# Patient Record
Sex: MALE | Race: BLACK OR AFRICAN AMERICAN | NOT HISPANIC OR LATINO | Employment: OTHER | URBAN - METROPOLITAN AREA
[De-identification: names, ages, dates, MRNs, and addresses within clinical notes are randomized per-mention and may not be internally consistent; named-entity substitution may affect disease eponyms.]

---

## 2017-05-24 ENCOUNTER — HOSPITAL ENCOUNTER (EMERGENCY)
Facility: CLINIC | Age: 52
Discharge: HOME | End: 2017-05-24
Attending: EMERGENCY MEDICINE | Admitting: EMERGENCY MEDICINE
Payer: COMMERCIAL

## 2017-05-24 ENCOUNTER — APPOINTMENT (OUTPATIENT)
Dept: GENERAL RADIOLOGY | Facility: CLINIC | Age: 52
End: 2017-05-24
Attending: EMERGENCY MEDICINE
Payer: COMMERCIAL

## 2017-05-24 VITALS
RESPIRATION RATE: 20 BRPM | HEART RATE: 97 BPM | OXYGEN SATURATION: 97 % | BODY MASS INDEX: 25.07 KG/M2 | SYSTOLIC BLOOD PRESSURE: 137 MMHG | WEIGHT: 190 LBS | DIASTOLIC BLOOD PRESSURE: 92 MMHG | TEMPERATURE: 99.1 F

## 2017-05-24 DIAGNOSIS — S46.002A ROTATOR CUFF INJURY, LEFT, INITIAL ENCOUNTER: ICD-10-CM

## 2017-05-24 PROCEDURE — 73030 X-RAY EXAM OF SHOULDER: CPT | Mod: LT

## 2017-05-24 PROCEDURE — 25000132 ZZH RX MED GY IP 250 OP 250 PS 637: Performed by: EMERGENCY MEDICINE

## 2017-05-24 PROCEDURE — 99283 EMERGENCY DEPT VISIT LOW MDM: CPT

## 2017-05-24 RX ORDER — OXYCODONE AND ACETAMINOPHEN 5; 325 MG/1; MG/1
2 TABLET ORAL ONCE
Status: COMPLETED | OUTPATIENT
Start: 2017-05-24 | End: 2017-05-24

## 2017-05-24 RX ORDER — NAPROXEN 500 MG/1
250 TABLET ORAL
Qty: 30 TABLET | Refills: 0 | Status: SHIPPED | OUTPATIENT
Start: 2017-05-24 | End: 2017-06-01

## 2017-05-24 RX ORDER — HYDROCODONE BITARTRATE AND ACETAMINOPHEN 5; 325 MG/1; MG/1
1-2 TABLET ORAL EVERY 4 HOURS PRN
Qty: 15 TABLET | Refills: 0 | Status: SHIPPED | OUTPATIENT
Start: 2017-05-24 | End: 2019-06-20

## 2017-05-24 RX ADMIN — OXYCODONE HYDROCHLORIDE AND ACETAMINOPHEN 2 TABLET: 5; 325 TABLET ORAL at 15:40

## 2017-05-24 ASSESSMENT — ENCOUNTER SYMPTOMS: MYALGIAS: 1

## 2017-05-24 NOTE — ED AVS SNAPSHOT
[facility] Emergency Department    [address]    [facility] [address]    Phone:  [phone]    Fax:  [phone]                                       Salas Macias   MRN: [id number]    Department:  [facility] Emergency Department   Date of Visit:  5/24/2017           Patient Information     Date Of Birth          1965        Your diagnoses for this visit were:     Rotator cuff injury, left, initial encounter        You were seen by Ade Walters MD.      Follow-up Information     Follow up with Clinic, [facility].    Specialty:  Internal Medicine    Contact information:    [address]  [facility] [address]  [phone]          Discharge Instructions         Understanding Rotator Cuff Injuries  The rotator cuff is a team of muscles and connecting tendons in the shoulder. It attaches your upper arm to your shoulder blade. Your rotator cuff helps you reach, throw, push, pull, and lift. Without it, your shoulder can't do its job properly.        Overuse tendonitis is irritation, bruising, or fraying of the rotator cuff.       A healthy rotator cuff  A healthy rotator cuff gives your shoulder flexibility and control. The rotator cuff holds your upper arm bone (humerus) in your shoulder socket (glenoid). It also helps move the shoulder.  A damaged rotator cuff  Pain and weakness told you that something was wrong with your shoulder. Now you know it s a rotator cuff problem. Rotator cuff tendons can become damaged or inflamed. This is called tendonitis. Possible causes include:    Irritation from overuse    Bursal inflammation (bursitis)    Pinching (impingement)    Calcium deposits (calcification)    Tears in the tendon.  Getting your shoulder healthy again  Care for your shoulder will most likely begin with nonsurgical treatments. You may start with simple rest. If needed, you may have injections that decrease inflammation and pain. Your  "healthcare provider will tell you how often you may need these treatments. If rest and injections relieve your pain, you will be given an exercise program. This will help restore your shoulder s strength and function. If your pain continues, your healthcare provider may suggest surgery to repair the rotator cuff.    6341-4383 [employer]. [address] [place] [address]. All rights reserved. This information is not intended as a substitute for professional medical care. Always follow your healthcare professional's instructions.          24 Hour Appointment Hotline       To make an appointment at any [facility], call [phone] ([phone]). If you don't have a family doctor or clinic, we will help you find one. [facility] clinics are conveniently located to serve the needs of you and your family.          ED Discharge Orders     PHYSICAL THERAPY REFERRAL       *This therapy referral will be filtered to a centralized scheduling office at [facility] and the patient will receive a call to schedule an appointment at a [facility] location most convenient for them. *     [facility] provides Physical Therapy evaluation and treatment and many specialty services across the [facility] system.  If requesting a specialty program, please choose from the list below.    If you have not heard from the scheduling office within 2 business days, please call [phone] for all locations, with the exception of [place], please call [phone].  Treatment: Evaluation & Treatment    Please be aware that coverage of these services is subject to the terms and limitations of your health insurance plan.  Call member services at your health plan with any benefit or coverage questions.      **Note to Provider:  If you are referring outside of [facility] for the therapy appointment, please list the name of the location in the \"special instructions\" above, print the " referral and give to the patient to schedule the appointment.                     Review of your medicines      START taking        Dose / Directions Last dose taken    HYDROcodone-acetaminophen 5-325 MG per tablet   Commonly known as:  NORCO   Dose:  1-2 tablet   Quantity:  15 tablet        Take 1-2 tablets by mouth every 4 hours as needed for moderate to severe pain   Refills:  0        naproxen 500 MG tablet   Commonly known as:  NAPROSYN   Dose:  250 mg   Quantity:  30 tablet        Take 0.5 tablets (250 mg) by mouth 3 times daily (with meals) for 8 days   Refills:  0          Our records show that you are taking the medicines listed below. If these are incorrect, please call your family doctor or clinic.        Dose / Directions Last dose taken    FLUoxetine 10 MG capsule   Commonly known as:  PROzac   Dose:  10 mg   Quantity:  90 capsule        Take 1 capsule (10 mg) by mouth daily   Refills:  3        OLANZapine 7.5 MG tablet   Commonly known as:  zyPREXA   Quantity:  49 tablet        Refills:  0        traZODone 100 MG tablet   Commonly known as:  DESYREL   Dose:  100 mg   Quantity:  90 tablet        Take 1 tablet (100 mg) by mouth nightly as needed for sleep   Refills:  3        vitamin D 2000 UNITS tablet   Dose:  2000 Units   Quantity:  100 tablet        Take 2,000 Units by mouth daily   Refills:  3                Prescriptions were sent or printed at these locations (2 Prescriptions)                   Other Prescriptions                Printed at Department/Unit printer (2 of 2)         HYDROcodone-acetaminophen (NORCO) 5-325 MG per tablet               naproxen (NAPROSYN) 500 MG tablet                Procedures and tests performed during your visit     Shoulder XR, G/E 3 views, left      Orders Needing Specimen Collection     None      Pending Results     No orders found from 5/22/2017 to 5/25/2017.            Pending Culture Results     No orders found from 5/22/2017 to 5/25/2017.            Pending  Results Instructions     If you had any lab results that were not finalized at the time of your Discharge, you can call the ED Lab Result RN at [phone]. You will be contacted by this team for any positive Lab results or changes in treatment. The nurses are available 7 days a week from 10A to 6:30P.  You can leave a message 24 hours per day and they will return your call.        Test Results From Your Hospital Stay        5/24/2017  4:02 PM      Narrative     SHOULDER LEFT THREE OR MORE VIEWS   5/24/2017 3:58 PM     HISTORY: Left shoulder pain.    COMPARISON: None.        Impression     IMPRESSION: Calcification noted in the distal rotator cuff compatible  with calcific tendinitis. No evidence of fracture or dislocation.    SOPHIE FISHER MD                Clinical Quality Measure: Blood Pressure Screening     Your blood pressure was checked while you were in the emergency department today. The last reading we obtained was  BP: (!) 141/109 . Please read the guidelines below about what these numbers mean and what you should do about them.  If your systolic blood pressure (the top number) is less than 120 and your diastolic blood pressure (the bottom number) is less than 80, then your blood pressure is normal. There is nothing more that you need to do about it.  If your systolic blood pressure (the top number) is 120-139 or your diastolic blood pressure (the bottom number) is 80-89, your blood pressure may be higher than it should be. You should have your blood pressure rechecked within a year by a primary care provider.  If your systolic blood pressure (the top number) is 140 or greater or your diastolic blood pressure (the bottom number) is 90 or greater, you may have high blood pressure. High blood pressure is treatable, but if left untreated over time it can put you at risk for heart attack, stroke, or kidney failure. You should have your blood pressure rechecked by a primary care provider within the next 4  "weeks.  If your provider in the emergency department today gave you specific instructions to follow-up with your doctor or provider even sooner than that, you should follow that instruction and not wait for up to 4 weeks for your follow-up visit.        Thank you for choosing [facility]       Thank you for choosing [facility] for your care. Our goal is always to provide you with excellent care. Hearing back from our patients is one way we can continue to improve our services. Please take a few minutes to complete the written survey that you may receive in the mail after you visit with us. Thank you!        [employer] Information     [employer] lets you send messages to your doctor, view your test results, renew your prescriptions, schedule appointments and more. To sign up, go to [email or web address] . Click on \"Log in\" on the left side of the screen, which will take you to the Welcome page. Then click on \"Sign up Now\" on the right side of the page.     You will be asked to enter the access code listed below, as well as some personal information. Please follow the directions to create your username and password.     Your access code is: [email or web address]  Expires: 2017  4:17 PM     Your access code will  in 90 days. If you need help or a new code, please call your [facility] clinic or [phone].        Care EveryWhere ID     This is your Care EveryWhere ID. This could be used by other organizations to access your [facility] medical records  [id number]        After Visit Summary       This is your record. Keep this with you and show to your community pharmacist(s) and doctor(s) at your next visit.                  "

## 2017-05-24 NOTE — ED AVS SNAPSHOT
[facility] Emergency Department    [address]    [facility] [address]    Phone:  [phone]    Fax:  [phone]                                       Salas Macias   MRN: [id number]    Department:  [facility] Emergency Department   Date of Visit:  5/24/2017           After Visit Summary Signature Page     I have received my discharge instructions, and my questions have been answered. I have discussed any challenges I see with this plan with the nurse or doctor.    ..........................................................................................................................................  Patient/Patient Representative Signature      ..........................................................................................................................................  Patient Representative Print Name and Relationship to Patient    ..................................................               ................................................  Date                                            Time    ..........................................................................................................................................  Reviewed by Signature/Title    ...................................................              ..............................................  Date                                                            Time

## 2017-05-24 NOTE — ED NOTES
Left shoulder pain started about one week ago.  Pain radiates down to elbow and into left 4th and 3rd fingers.  No known injury, denies history of this in the past.  ABCs intact.  Patient is alert and oriented x3.

## 2017-05-24 NOTE — DISCHARGE INSTRUCTIONS
Understanding Rotator Cuff Injuries  The rotator cuff is a team of muscles and connecting tendons in the shoulder. It attaches your upper arm to your shoulder blade. Your rotator cuff helps you reach, throw, push, pull, and lift. Without it, your shoulder can't do its job properly.        Overuse tendonitis is irritation, bruising, or fraying of the rotator cuff.       A healthy rotator cuff  A healthy rotator cuff gives your shoulder flexibility and control. The rotator cuff holds your upper arm bone (humerus) in your shoulder socket (glenoid). It also helps move the shoulder.  A damaged rotator cuff  Pain and weakness told you that something was wrong with your shoulder. Now you know it s a rotator cuff problem. Rotator cuff tendons can become damaged or inflamed. This is called tendonitis. Possible causes include:    Irritation from overuse    Bursal inflammation (bursitis)    Pinching (impingement)    Calcium deposits (calcification)    Tears in the tendon.  Getting your shoulder healthy again  Care for your shoulder will most likely begin with nonsurgical treatments. You may start with simple rest. If needed, you may have injections that decrease inflammation and pain. Your healthcare provider will tell you how often you may need these treatments. If rest and injections relieve your pain, you will be given an exercise program. This will help restore your shoulder s strength and function. If your pain continues, your healthcare provider may suggest surgery to repair the rotator cuff.    5627-0708 [employer]. [address] [place] [address], [place] [address]. All rights reserved. This information is not intended as a substitute for professional medical care. Always follow your healthcare professional's instructions.

## 2017-06-06 ENCOUNTER — THERAPY VISIT (OUTPATIENT)
Dept: PHYSICAL THERAPY | Facility: CLINIC | Age: 52
End: 2017-06-06
Payer: COMMERCIAL

## 2017-06-06 DIAGNOSIS — S46.009A ROTATOR CUFF INJURY: Primary | ICD-10-CM

## 2017-06-06 PROCEDURE — 97161 PT EVAL LOW COMPLEX 20 MIN: CPT | Mod: GP | Performed by: PHYSICAL THERAPIST

## 2017-06-06 PROCEDURE — 97110 THERAPEUTIC EXERCISES: CPT | Mod: GP | Performed by: PHYSICAL THERAPIST

## 2017-06-06 NOTE — PROGRESS NOTES
Subjective:    HPI                    Objective:    System    Physical Exam    General     [facility]     Physical Therapy Initial Evaluation:   2017  Subjective:   Chief Complaint:    Pain: Anterior left shoulder   Numbness/Tingling: None presently   Weakness: in the left shoulder   Stiffness: None   Other: None  New/Recurrent/Chronic: New  Patient's Goal(s): Get the pain to go away and use his arm again  DOI/onset: 4 weeks ago   Referral Date: 2017  Mechanism of onset: unknown, sudden onset  PMH/surgical history/trauma: Smoking (1 pack per day), paranoid schizophrenia  Medications: Pain, Sleep, Anti-depressants  General health as reported by patient: Good    Previous Treatment (Effect): None  Imaging: X-ray: IMPRESSION: Calcification noted in the distal rotator cuff compatible with calcific tendinitis. No evidence of fracture or dislocation.  Symptom Stability: stable  AM/PM: same all day  Quality of Pain: sharp pain when moving  Pain: 3/10 at present, 3/10 at best, 6/10 at worst  Better: stretch it,   Worse: reaching (overehad, behind the back),   Progression of Symptoms since onset: little bit better   Occupation: None  Sleepin-3 hours to fall asleep, wakes him 3 or 4 times per night  Other current functional challenges: reaching, sleeping  Current Functional Status: reaching - pain with reaching up to 6/10, cannot reach above chest height or behind the back    Previous Functional Status: No restrictions with reaching or sleeping previously  Current HEP/exercise regimen: None  Hand/Leg Dominance: left handed  Transportation: Does not drive, uses public services  Live with Others: Has other at home that can help, not caring for others  Red Flags:   - Patient denies the following:  Fever ; Weakness ; Numbness/Tingling ; Chest Pain ;   - Patient reports the following: Night Pain ;    Objective:    Posture: Poor sitting and standing posture    Scapular Positioning: Mild prominence of the scapulae  bilaterally    Shoulder: (* indicates patient's pain)   AROM L AROM R   Flex/  Elevation 75* 147   GH Abd 50* 125   ER 32* 47   IR/Ext To L Hip* T10     Palpation: Very tender at the anterior shoulder      Assessment/Plan:      Patient is a 52 year old male with left side shoulder complaints.    Patient has the following significant findings with corresponding treatment plan.                Diagnosis 1:  Rotator Cuff Injury, Left  Pain -  hot/cold therapy, manual therapy, splint/taping/bracing/orthotics, self management, education, directional preference exercise and home program  Decreased ROM/flexibility - manual therapy, therapeutic exercise, therapeutic activity and home program  Decreased strength - therapeutic exercise, therapeutic activities and home program  Decreased function - therapeutic activities and home program  Impaired posture - neuro re-education, therapeutic activities and home program    Therapy Evaluation Codes:   1) History comprised of:   Personal factors that impact the plan of care:      Cognition and Profession.    Comorbidity factors that impact the plan of care are:      Smoking.     Medications impacting care: Anti-depressant, Pain and Sleep.  2) Examination of Body Systems comprised of:   Body structures and functions that impact the plan of care:      Shoulder.   Activity limitations that impact the plan of care are:      Lifting, Sleeping and Reaching.  3) Clinical presentation characteristics are:   Stable/Uncomplicated.  4) Decision-Making    Low complexity using standardized patient assessment instrument and/or measureable assessment of functional outcome.  Cumulative Therapy Evaluation is: Low complexity.    Previous and current functional limitations:  (See Goal Flow Sheet for this information)    Short term and Long term goals: (See Goal Flow Sheet for this information)     Communication ability:  Patient appears to be able to clearly communicate and understand verbal and  written communication and follow directions correctly.  Treatment Explanation - The following has been discussed with the patient:   RX ordered/plan of care  Anticipated outcomes  Possible risks and side effects  This patient would benefit from PT intervention to resume normal activities.   Rehab potential is good.    Frequency:  1 X week, once daily  Duration:  for 4 weeks tapering to 2 X a month over 6 weeks  Discharge Plan:  Achieve all LTG.  Independent in home treatment program.  Reach maximal therapeutic benefit.    Please refer to the daily flowsheet for treatment today, total treatment time and time spent performing 1:1 timed codes.

## 2017-06-07 PROBLEM — S46.009A ROTATOR CUFF INJURY: Status: ACTIVE | Noted: 2017-06-07

## 2017-07-19 PROBLEM — S46.009A ROTATOR CUFF INJURY: Status: RESOLVED | Noted: 2017-06-07 | Resolved: 2017-07-19

## 2017-07-19 NOTE — PROGRESS NOTES
Subjective:    HPI                    Objective:    System    Physical Exam    General     ROS    Assessment/Plan:      DISCHARGE REPORT    Updated as of July 19, 2017.    Discharge report is from initial evaluation on Jun 6, 2017.       SUBJECTIVE  Subjective changes noted by patient: Patient has not returned since initial evaluation.   Changes in function:  Patient has not returned to clinic to assess.   Adverse reaction to treatment or activity: Patient has not returned to clinic to assess.     OBJECTIVE  Changes noted in objective findings:  Patient has failed to return to therapy so current objective findings are unknown.  Objective: See initial evaluation note.      ASSESSMENT/PLAN  STG/LTGs have been met or progress has been made towards goals:  Goal status unknown  Assessment of Progress: Patient has not returned to therapy.  Current status is unknown and discharge G code cannot be reported.  Kelsy continues to require the following intervention to meet STG and LTG's:  Unknown, patient has not returned to therapy.     Recommendations:  No recommendations can accurately be made. Patient has failed to return to therapy.     Please refer to the daily flowsheet for treatment today, total treatment time and time spent performing 1:1 timed codes.

## 2018-08-14 NOTE — ED PROVIDER NOTES
History     Chief Complaint:  Shoulder Pain    HPI   Salas Macias is a 52 year old male who is left handed who presents to the emergency department today for evaluation of left shoulder pain. He has been having the pain for about one week with radiation to the elbow and into the 3rd and 4th fingers. The injury is nontraumatic. He has no history of rotator cuff injuries. The patient has not been using pain medications at home.  Patient is left handed.  Numbness in middle fingers.   Patient has not yet followed up with PCP.  No chest pain or cough.    Allergies:  No Known Drug Allergies      Medications:    Prozac  Desyrel  Zyprexa    Past Medical History:    Depression   Schizophrenia     Past Surgical History:    History reviewed. No pertinent past surgical history.     Family History:    History reviewed. No pertinent family history.      Social History:  The patient was accompanied to the ED by friend.  Smoking Status: current every day smoker  Alcohol Use: negative    Marital Status:  Single [1]    Review of Systems   Musculoskeletal: Positive for myalgias.   All other systems reviewed and are negative.  Left shoulder pain started about one week ago.  Pain radiates down to elbow and into left 4th and 3rd fingers.  No known injury, denies history of this in the past.    Physical Exam   Vitals:  Patient Vitals for the past 24 hrs:   BP Temp Temp src Pulse Resp SpO2 Weight   05/24/17 1625 (!) 137/92 - - 97 20 97 % -   05/24/17 1512 (!) 141/109 99.1  F (37.3  C) Oral 104 20 97 % 86.2 kg (190 lb)      Physical Exam   Musculoskeletal:        Arms:    GEN: patient smiling, no distress  HEAD: atraumatic, normocephalic  EYES: pupils reactive (3plus to 2plus), extraocular muscles intact, conjunctivae normal  ENT: TMs flat and white bilaterally, oropharynx normal with no erythema or exudate, mucus membranes moist, floor of mouth is soft, midface stable, nose mucosa pink with no exudate bilaterally  NECK: no posterior  midline tenderness, left paraspinous trapezius  RESPIRATORY: no tachypnea, breath sounds clear to auscultation (no rales, wheezes, rhonchi)  CVS: normal S1/S2, no murmurs/rubs/gallops  ABDOMEN: soft, nontender, no masses or organomegaly, no rebound, positive bowel sounds  BACK:  no spinal tenderness  EXTREMITIES: intact pulses x 2 (radial pulses intact), no edema.   5/5.  Pain with left arm adduction (strength is 4/5 to 3-/5).    Positive crossed arm raise.  Cap refill < 3 seconds.  MUSCULOSKELETAL: no deformities.  Clavicle and AC/SC nontender.  Posterior scapula is nontender.  Left glenohumeral head and deltoid tender to palpation.  SKIN: warm and dry  NEURO: GCS 15, cranial nerves intact.  Motor- moves all 4 extremities with 5/5 strength.  Sensation- intact upper arm dermatomes  Reflexes- DTRs 2plus.  Coordination- ambulatory.  Overall symmetrical exam  HEME: no bruising or petechiae/contusions  LYMPH: no lymphadenopathy    Emergency Department Course       Imaging:  Radiology findings were communicated with the patient who voiced understanding of the findings.    Shoulder XR, G/E 3 views, left   IMPRESSION: Calcification noted in the distal rotator cuff compatible   with calcific tendinitis. No evidence of fracture or dislocation.      SOPHIE FISHER MD     Interventions:  1540 Norco 650 mg oral      Emergency Department Course:  Nursing notes and vitals reviewed.  I performed an exam of the patient as documented above.   The patient was sent for imaging per above while in the emergency department, results above.   I discussed the treatment plan with the patient. They expressed understanding of this plan and consented to discharge. They will be discharged home with instructions for care and follow up. In addition, the patient will return to the emergency department if their symptoms persist, worsen, if new symptoms arise or if there is any concern.  All questions were answered.   I personally reviewed the  imaging results with the patient and answered all related questions prior to discharge.    BP (!) 137/92  Pulse 97  Temp 99.1  F (37.3  C) (Oral)  Resp 20  Wt 86.2 kg (190 lb)  SpO2 97%  BMI 25.07 kg/m2  Sling given to the patient.    Physical therapy referral put in computer.    Impression & Plan      Medical Decision Making:  Salas Macias is a 52 year old male who is left handed who has pain on his left shoulder. On examination he has limited range of motion as though a rotator cuff tendonitis or a tear. He is unable abduct on that side without assistance. His x-ray does show calcific tendinitis but no fracture. I have put in a physical therapy referral for him. We will give him pain medicine, a sling, and range of motion exercises. He should ice it as well.   Followup [facility].    Diagnosis:    ICD-10-CM    1. Rotator cuff injury, left, initial encounter S46.002A PHYSICAL THERAPY REFERRAL     Disposition:   Discharge     Discharge Medications:  Discharge Medication List as of 5/24/2017  4:23 PM      START taking these medications    Details   HYDROcodone-acetaminophen (NORCO) 5-325 MG per tablet Take 1-2 tablets by mouth every 4 hours as needed for moderate to severe pain, Disp-15 tablet, R-0, Local Print      naproxen (NAPROSYN) 500 MG tablet Take 0.5 tablets (250 mg) by mouth 3 times daily (with meals) for 8 days, Disp-30 tablet, R-0, Local Print           Instructions to patient:  You will be sore!    Ice to the area.  Motrin (ie ibuprofen) or tylenol for mild pain.  Norco (ie tylenol with narcotic) for severe pain.  Followup with your doctor in the next few days.    Scribe Disclosure:  I, Neno Jennings, am serving as a scribe at 3:22 PM on 5/24/2017 to document services personally performed by Ade Walters MD, based on my observations and the provider's statements to me.   5/24/2017   [facility] EMERGENCY DEPARTMENT       Ade Walters MD  05/25/17 3730     no

## 2019-06-20 ENCOUNTER — OFFICE VISIT (OUTPATIENT)
Dept: FAMILY MEDICINE | Facility: CLINIC | Age: 54
End: 2019-06-20
Payer: COMMERCIAL

## 2019-06-20 VITALS
WEIGHT: 186 LBS | BODY MASS INDEX: 26.04 KG/M2 | HEIGHT: 71 IN | SYSTOLIC BLOOD PRESSURE: 128 MMHG | TEMPERATURE: 98.6 F | OXYGEN SATURATION: 100 % | HEART RATE: 74 BPM | DIASTOLIC BLOOD PRESSURE: 78 MMHG

## 2019-06-20 DIAGNOSIS — Z00.00 ENCOUNTER FOR ROUTINE ADULT HEALTH EXAMINATION WITHOUT ABNORMAL FINDINGS: Primary | ICD-10-CM

## 2019-06-20 DIAGNOSIS — Z12.11 SCREENING FOR COLON CANCER: ICD-10-CM

## 2019-06-20 DIAGNOSIS — Z13.220 SCREENING FOR LIPID DISORDERS: ICD-10-CM

## 2019-06-20 DIAGNOSIS — Z13.1 SCREENING FOR DIABETES MELLITUS: ICD-10-CM

## 2019-06-20 DIAGNOSIS — Z11.4 SCREENING FOR HIV (HUMAN IMMUNODEFICIENCY VIRUS): ICD-10-CM

## 2019-06-20 DIAGNOSIS — R53.83 OTHER FATIGUE: ICD-10-CM

## 2019-06-20 DIAGNOSIS — Z12.5 SCREENING FOR PROSTATE CANCER: ICD-10-CM

## 2019-06-20 PROCEDURE — 99386 PREV VISIT NEW AGE 40-64: CPT | Performed by: NURSE PRACTITIONER

## 2019-06-20 PROCEDURE — 99213 OFFICE O/P EST LOW 20 MIN: CPT | Mod: 25 | Performed by: NURSE PRACTITIONER

## 2019-06-20 RX ORDER — ALPRAZOLAM 0.5 MG
0.5 TABLET ORAL 2 TIMES DAILY
COMMUNITY
End: 2022-02-11

## 2019-06-20 ASSESSMENT — ANXIETY QUESTIONNAIRES
1. FEELING NERVOUS, ANXIOUS, OR ON EDGE: SEVERAL DAYS
3. WORRYING TOO MUCH ABOUT DIFFERENT THINGS: SEVERAL DAYS
6. BECOMING EASILY ANNOYED OR IRRITABLE: NOT AT ALL
5. BEING SO RESTLESS THAT IT IS HARD TO SIT STILL: NOT AT ALL
GAD7 TOTAL SCORE: 3
7. FEELING AFRAID AS IF SOMETHING AWFUL MIGHT HAPPEN: NOT AT ALL
IF YOU CHECKED OFF ANY PROBLEMS ON THIS QUESTIONNAIRE, HOW DIFFICULT HAVE THESE PROBLEMS MADE IT FOR YOU TO DO YOUR WORK, TAKE CARE OF THINGS AT HOME, OR GET ALONG WITH OTHER PEOPLE: SOMEWHAT DIFFICULT
2. NOT BEING ABLE TO STOP OR CONTROL WORRYING: SEVERAL DAYS

## 2019-06-20 ASSESSMENT — PATIENT HEALTH QUESTIONNAIRE - PHQ9
SUM OF ALL RESPONSES TO PHQ QUESTIONS 1-9: 11
5. POOR APPETITE OR OVEREATING: NOT AT ALL

## 2019-06-20 ASSESSMENT — MIFFLIN-ST. JEOR: SCORE: 1714.94

## 2019-06-20 NOTE — PROGRESS NOTES
SUBJECTIVE:   CC: Salas Macias is an 54 year old male who presents for preventative health visit.     Healthy Habits:    Getting at least 3 servings of Calcium per day:  NO    Bi-annual eye exam:  Yes    Dental care twice a year:  Yes (once a year)    Sleep apnea or symptoms of sleep apnea:  None    Diet:  Regular (no restrictions)    Frequency of exercise:  None    Duration of exercise:  N/A    Taking medications regularly:  Yes    Barriers to taking medications:  None    Medication side effects:  None    PHQ-2 Total Score:    Additional concerns today:  No      HPI: Feels like he gets tired easily with activity. No chest pain / pressure, diaphoresis, nausea / vomiting, numbness / tingling of face or extremities, jaw pain, arm pain, etc. His brother does have known cardiovascular disease. Salas is a current smoker.     Today's PHQ-2 Score:   PHQ-2 ( 1999 Pfizer) 6/20/2019   Q1: Little interest or pleasure in doing things 2   Q2: Feeling down, depressed or hopeless 1   PHQ-2 Score 3       Abuse: Current or Past(Physical, Sexual or Emotional)- No  Do you feel safe in your environment? Yes    Social History     Tobacco Use     Smoking status: Current Every Day Smoker     Smokeless tobacco: Never Used   Substance Use Topics     Alcohol use: No     If you drink alcohol do you typically have >3 drinks per day or >7 drinks per week? Not applicable    Last PSA: No results found for: PSA    Reviewed orders with patient. Reviewed health maintenance and updated orders accordingly - Yes  Lab work is in process    Reviewed and updated as needed this visit by clinical staff  Tobacco  Allergies  Meds  Med Hx  Surg Hx  Fam Hx  Soc Hx        Reviewed and updated as needed this visit by Provider  Tobacco  Med Hx  Surg Hx  Fam Hx  Soc Hx         Review of Systems  CONSTITUTIONAL: NEGATIVE for fever, chills, change in weight  INTEGUMENTARY/SKIN: NEGATIVE for worrisome rashes, moles or lesions  EYES: NEGATIVE for vision  "changes or irritation  ENT: NEGATIVE for ear, mouth and throat problems  RESP: POSITIVE for being winded with activity  CV: NEGATIVE for chest pain, palpitations or peripheral edema  GI: NEGATIVE for nausea, abdominal pain, heartburn, or change in bowel habits   male: negative for dysuria, hematuria, decreased urinary stream, erectile dysfunction, urethral discharge  MUSCULOSKELETAL: NEGATIVE for significant arthralgias or myalgia  NEURO: NEGATIVE for weakness, dizziness or paresthesias  PSYCHIATRIC: NEGATIVE for changes in mood or affect. POSITIVE for schizophrenia.    OBJECTIVE:   /78   Pulse 74   Temp 98.6  F (37  C) (Tympanic)   Ht 1.81 m (5' 11.26\")   Wt 84.4 kg (186 lb)   SpO2 100%   BMI 25.75 kg/m      Physical Exam  GENERAL: healthy, alert and no distress  EYES: Eyes grossly normal to inspection, PERRL and conjunctivae and sclerae normal  HENT: ear canals and TM's normal, nose and mouth without ulcers or lesions  NECK: no adenopathy, no asymmetry, masses, or scars and thyroid normal to palpation  RESP: lungs clear to auscultation - no rales, rhonchi or wheezes  CV: regular rate and rhythm, normal S1 S2, no S3 or S4, no murmur, click or rub, no peripheral edema and peripheral pulses strong  ABDOMEN: soft, nontender, no hepatosplenomegaly, no masses and bowel sounds normal  MS: no gross musculoskeletal defects noted, no edema  SKIN: no suspicious lesions or rashes  NEURO: Normal strength and tone, mentation intact and speech normal  PSYCH: mentation appears normal, affect normal/bright    Diagnostic Test Results:  No results found for this or any previous visit (from the past 24 hour(s)).    ASSESSMENT/PLAN:   Salas was seen today for physical. Generally-well. Mental health issues but they are well-treated / controlled. He does state that he gets tired easily with exercise. Will check hemoglobin and thyroid today. It seems respiratory in nature (smoking, lack of other red flag symptoms), but " "will have low threshold to work him up from a cardiac standpoint. He agrees to return to the clinic for this if this issue persists. Will screen lipids, BG, HIV, and PSA today, as well. Colonoscopy ordered. Discussed reasons to call or return to clinic. Salas acknowledges and demonstrates understanding of circumstances under which care should be sought urgently or emergently. Follow up as discussed.    Diagnoses and all orders for this visit:    Encounter for routine adult health examination without abnormal findings    Screening for lipid disorders  -     Lipid panel reflex to direct LDL Non-fasting; Future    Screening for diabetes mellitus  -     Comprehensive metabolic panel; Future    Screening for prostate cancer  -     Prostate spec antigen screen; Future    Screening for HIV (human immunodeficiency virus)  -     HIV Screening; Future    Screening for colon cancer  -     GASTROENTEROLOGY ADULT REF PROCEDURE ONLY Rolanda [facility]stonge [phone]; [facility]    Other fatigue  -     CBC with platelets; Future  -     Comprehensive metabolic panel; Future  -     TSH with free T4 reflex; Future        COUNSELING:   Reviewed preventive health counseling, as reflected in patient instructions    Estimated body mass index is 25.75 kg/m  as calculated from the following:    Height as of this encounter: 1.81 m (5' 11.26\").    Weight as of this encounter: 84.4 kg (186 lb).          reports that he has been smoking.  He has never used smokeless tobacco.  Tobacco Cessation Action Plan: Information offered: Patient not interested at this time    Counseling Resources:  ATP IV Guidelines  Pooled Cohorts Equation Calculator  FRAX Risk Assessment  ICSI Preventive Guidelines  Dietary Guidelines for Americans, 2010  USDA's MyPlate  ASA Prophylaxis  Lung CA Screening    Jj Hale NP  [facility] [place]  "

## 2019-06-21 ASSESSMENT — ANXIETY QUESTIONNAIRES: GAD7 TOTAL SCORE: 3

## 2019-06-24 ENCOUNTER — HOSPITAL ENCOUNTER (OUTPATIENT)
Facility: CLINIC | Age: 54
End: 2019-06-24
Attending: INTERNAL MEDICINE | Admitting: INTERNAL MEDICINE
Payer: COMMERCIAL

## 2019-06-24 DIAGNOSIS — Z12.5 SCREENING FOR PROSTATE CANCER: ICD-10-CM

## 2019-06-24 DIAGNOSIS — R53.83 OTHER FATIGUE: ICD-10-CM

## 2019-06-24 DIAGNOSIS — Z11.4 SCREENING FOR HIV (HUMAN IMMUNODEFICIENCY VIRUS): ICD-10-CM

## 2019-06-24 DIAGNOSIS — Z13.1 SCREENING FOR DIABETES MELLITUS: ICD-10-CM

## 2019-06-24 DIAGNOSIS — Z13.220 SCREENING FOR LIPID DISORDERS: ICD-10-CM

## 2019-06-24 LAB
ERYTHROCYTE [DISTWIDTH] IN BLOOD BY AUTOMATED COUNT: 14.7 % (ref 10–15)
HCT VFR BLD AUTO: 45.9 % (ref 40–53)
HGB BLD-MCNC: 15.6 G/DL (ref 13.3–17.7)
MCH RBC QN AUTO: 30.1 PG (ref 26.5–33)
MCHC RBC AUTO-ENTMCNC: 34 G/DL (ref 31.5–36.5)
MCV RBC AUTO: 89 FL (ref 78–100)
PLATELET # BLD AUTO: 206 10E9/L (ref 150–450)
RBC # BLD AUTO: 5.18 10E12/L (ref 4.4–5.9)
WBC # BLD AUTO: 9.4 10E9/L (ref 4–11)

## 2019-06-24 PROCEDURE — 80053 COMPREHEN METABOLIC PANEL: CPT | Performed by: NURSE PRACTITIONER

## 2019-06-24 PROCEDURE — 84443 ASSAY THYROID STIM HORMONE: CPT | Performed by: NURSE PRACTITIONER

## 2019-06-24 PROCEDURE — 85027 COMPLETE CBC AUTOMATED: CPT | Performed by: NURSE PRACTITIONER

## 2019-06-24 PROCEDURE — 87389 HIV-1 AG W/HIV-1&-2 AB AG IA: CPT | Performed by: NURSE PRACTITIONER

## 2019-06-24 PROCEDURE — 36415 COLL VENOUS BLD VENIPUNCTURE: CPT | Performed by: NURSE PRACTITIONER

## 2019-06-24 PROCEDURE — 80061 LIPID PANEL: CPT | Performed by: NURSE PRACTITIONER

## 2019-06-24 PROCEDURE — G0103 PSA SCREENING: HCPCS | Performed by: NURSE PRACTITIONER

## 2019-06-25 LAB
ALBUMIN SERPL-MCNC: 4 G/DL (ref 3.4–5)
ALP SERPL-CCNC: 93 U/L (ref 40–150)
ALT SERPL W P-5'-P-CCNC: 23 U/L (ref 0–70)
ANION GAP SERPL CALCULATED.3IONS-SCNC: 9 MMOL/L (ref 3–14)
AST SERPL W P-5'-P-CCNC: 14 U/L (ref 0–45)
BILIRUB SERPL-MCNC: 0.7 MG/DL (ref 0.2–1.3)
BUN SERPL-MCNC: 10 MG/DL (ref 7–30)
CALCIUM SERPL-MCNC: 8.9 MG/DL (ref 8.5–10.1)
CHLORIDE SERPL-SCNC: 108 MMOL/L (ref 94–109)
CHOLEST SERPL-MCNC: 155 MG/DL
CO2 SERPL-SCNC: 27 MMOL/L (ref 20–32)
CREAT SERPL-MCNC: 0.93 MG/DL (ref 0.66–1.25)
GFR SERPL CREATININE-BSD FRML MDRD: >90 ML/MIN/{1.73_M2}
GLUCOSE SERPL-MCNC: 109 MG/DL (ref 70–99)
HDLC SERPL-MCNC: 46 MG/DL
HIV 1+2 AB+HIV1 P24 AG SERPL QL IA: NONREACTIVE
LDLC SERPL CALC-MCNC: 98 MG/DL
NONHDLC SERPL-MCNC: 109 MG/DL
POTASSIUM SERPL-SCNC: 4 MMOL/L (ref 3.4–5.3)
PROT SERPL-MCNC: 7.6 G/DL (ref 6.8–8.8)
PSA SERPL-ACNC: 0.9 UG/L (ref 0–4)
SODIUM SERPL-SCNC: 144 MMOL/L (ref 133–144)
TRIGL SERPL-MCNC: 53 MG/DL
TSH SERPL DL<=0.005 MIU/L-ACNC: 0.5 MU/L (ref 0.4–4)

## 2019-12-03 ENCOUNTER — TELEPHONE (OUTPATIENT)
Dept: FAMILY MEDICINE | Facility: CLINIC | Age: 54
End: 2019-12-03

## 2019-12-03 NOTE — TELEPHONE ENCOUNTER
Called patient to do PHQ9 screening over the phone. Patient states that we can call him tomorrow as he is busy currently. Will attempt to follow up tomorrow.     Rox Reid RN, BSN  [facility]

## 2019-12-03 NOTE — TELEPHONE ENCOUNTER
Pt is due now to update PHQ9.  Please call pt. Follow up end date 2/18/20.   PHQ-9 SCORE 10/17/2014 6/20/2019   PHQ-9 Total Score 11 -   PHQ-9 Total Score - 11     Fly TOLBERT CMA

## 2019-12-04 ASSESSMENT — PATIENT HEALTH QUESTIONNAIRE - PHQ9: SUM OF ALL RESPONSES TO PHQ QUESTIONS 1-9: 18

## 2019-12-04 NOTE — TELEPHONE ENCOUNTER
Pt has updated PHQ 9 for panel management    PHQ-9 SCORE 10/17/2014 6/20/2019 12/4/2019   PHQ-9 Total Score 11 - -   PHQ-9 Total Score - 11 18       Pt reports taking fluoxetine 40 mg and 10 mg as prescribed by psychiatrist who he sees monthly and manages his meds.    Denies any concerns with medications.    Routing to Chencho MOCTEZUMA as MEGHNA.    Esther ROJAS RN  EP Triage

## 2020-11-18 ENCOUNTER — OFFICE VISIT (OUTPATIENT)
Dept: FAMILY MEDICINE | Facility: CLINIC | Age: 55
End: 2020-11-18
Payer: COMMERCIAL

## 2020-11-18 VITALS
TEMPERATURE: 97.5 F | SYSTOLIC BLOOD PRESSURE: 118 MMHG | OXYGEN SATURATION: 97 % | HEART RATE: 91 BPM | DIASTOLIC BLOOD PRESSURE: 78 MMHG | HEIGHT: 73 IN | BODY MASS INDEX: 25.71 KG/M2 | WEIGHT: 194 LBS

## 2020-11-18 DIAGNOSIS — Z13.1 SCREENING FOR DIABETES MELLITUS: ICD-10-CM

## 2020-11-18 DIAGNOSIS — Z13.0 SCREENING FOR DEFICIENCY ANEMIA: ICD-10-CM

## 2020-11-18 DIAGNOSIS — Z12.5 SCREENING FOR PROSTATE CANCER: ICD-10-CM

## 2020-11-18 DIAGNOSIS — Z12.11 SCREENING FOR COLON CANCER: ICD-10-CM

## 2020-11-18 DIAGNOSIS — Z13.220 SCREENING FOR LIPID DISORDERS: ICD-10-CM

## 2020-11-18 DIAGNOSIS — Z00.00 ROUTINE GENERAL MEDICAL EXAMINATION AT A HEALTH CARE FACILITY: Primary | ICD-10-CM

## 2020-11-18 LAB
BASOPHILS # BLD AUTO: 0 10E9/L (ref 0–0.2)
BASOPHILS NFR BLD AUTO: 0.1 %
DIFFERENTIAL METHOD BLD: ABNORMAL
EOSINOPHIL # BLD AUTO: 0.1 10E9/L (ref 0–0.7)
EOSINOPHIL NFR BLD AUTO: 1.1 %
ERYTHROCYTE [DISTWIDTH] IN BLOOD BY AUTOMATED COUNT: 15.7 % (ref 10–15)
HCT VFR BLD AUTO: 42.9 % (ref 40–53)
HGB BLD-MCNC: 13.9 G/DL (ref 13.3–17.7)
LYMPHOCYTES # BLD AUTO: 2.5 10E9/L (ref 0.8–5.3)
LYMPHOCYTES NFR BLD AUTO: 31 %
MCH RBC QN AUTO: 29.4 PG (ref 26.5–33)
MCHC RBC AUTO-ENTMCNC: 32.4 G/DL (ref 31.5–36.5)
MCV RBC AUTO: 91 FL (ref 78–100)
MONOCYTES # BLD AUTO: 1 10E9/L (ref 0–1.3)
MONOCYTES NFR BLD AUTO: 11.6 %
NEUTROPHILS # BLD AUTO: 4.6 10E9/L (ref 1.6–8.3)
NEUTROPHILS NFR BLD AUTO: 56.2 %
PLATELET # BLD AUTO: 186 10E9/L (ref 150–450)
RBC # BLD AUTO: 4.73 10E12/L (ref 4.4–5.9)
WBC # BLD AUTO: 8.2 10E9/L (ref 4–11)

## 2020-11-18 PROCEDURE — 85025 COMPLETE CBC W/AUTO DIFF WBC: CPT | Performed by: NURSE PRACTITIONER

## 2020-11-18 PROCEDURE — 82274 ASSAY TEST FOR BLOOD FECAL: CPT | Performed by: NURSE PRACTITIONER

## 2020-11-18 PROCEDURE — 36415 COLL VENOUS BLD VENIPUNCTURE: CPT | Performed by: NURSE PRACTITIONER

## 2020-11-18 PROCEDURE — 99396 PREV VISIT EST AGE 40-64: CPT | Performed by: NURSE PRACTITIONER

## 2020-11-18 PROCEDURE — 80053 COMPREHEN METABOLIC PANEL: CPT | Performed by: NURSE PRACTITIONER

## 2020-11-18 PROCEDURE — G0103 PSA SCREENING: HCPCS | Performed by: NURSE PRACTITIONER

## 2020-11-18 PROCEDURE — 80061 LIPID PANEL: CPT | Performed by: NURSE PRACTITIONER

## 2020-11-18 ASSESSMENT — PATIENT HEALTH QUESTIONNAIRE - PHQ9
SUM OF ALL RESPONSES TO PHQ QUESTIONS 1-9: 14
5. POOR APPETITE OR OVEREATING: SEVERAL DAYS

## 2020-11-18 ASSESSMENT — ANXIETY QUESTIONNAIRES
1. FEELING NERVOUS, ANXIOUS, OR ON EDGE: NOT AT ALL
7. FEELING AFRAID AS IF SOMETHING AWFUL MIGHT HAPPEN: NOT AT ALL
GAD7 TOTAL SCORE: 1
IF YOU CHECKED OFF ANY PROBLEMS ON THIS QUESTIONNAIRE, HOW DIFFICULT HAVE THESE PROBLEMS MADE IT FOR YOU TO DO YOUR WORK, TAKE CARE OF THINGS AT HOME, OR GET ALONG WITH OTHER PEOPLE: NOT DIFFICULT AT ALL
2. NOT BEING ABLE TO STOP OR CONTROL WORRYING: NOT AT ALL
3. WORRYING TOO MUCH ABOUT DIFFERENT THINGS: NOT AT ALL
5. BEING SO RESTLESS THAT IT IS HARD TO SIT STILL: NOT AT ALL
6. BECOMING EASILY ANNOYED OR IRRITABLE: NOT AT ALL

## 2020-11-18 ASSESSMENT — MIFFLIN-ST. JEOR: SCORE: 1773.86

## 2020-11-18 NOTE — PROGRESS NOTES
3  SUBJECTIVE:   CC: Salas Macias is an 55 year old male who presents for preventive health visit.       Patient has been advised of split billing requirements and indicates understanding: Yes     Healthy Habits:    Do you get at least three servings of calcium containing foods daily (dairy, green leafy vegetables, etc.)? no, taking calcium and/or vitamin D supplement: no    Amount of exercise or daily activities, outside of work: 0 day(s) per week    Problems taking medications regularly No    Medication side effects: No    Have you had an eye exam in the past two years? yes    Do you see a dentist twice per year? yes    Do you have sleep apnea, excessive snoring or daytime drowsiness?no      Today's PHQ-2 Score:   PHQ-2 ( 1999 Pfizer) 6/20/2019   Q1: Little interest or pleasure in doing things 2   Q2: Feeling down, depressed or hopeless 1   PHQ-2 Score 3       Abuse: Current or Past(Physical, Sexual or Emotional)- No  Do you feel safe in your environment? Yes      Social History     Tobacco Use     Smoking status: Current Every Day Smoker     Smokeless tobacco: Never Used   Substance Use Topics     Alcohol use: No     If you drink alcohol do you typically have >3 drinks per day or >7 drinks per week? No                      Last PSA:   PSA   Date Value Ref Range Status   06/24/2019 0.90 0 - 4 ug/L Final     Comment:     Assay Method:  Chemiluminescence using Siemens Vista analyzer       Reviewed orders with patient. Reviewed health maintenance and updated orders accordingly - Yes  Lab work is in process    Reviewed and updated as needed this visit by clinical staff  Tobacco  Allergies  Meds  Problems  Med Hx  Surg Hx  Fam Hx          Reviewed and updated as needed this visit by Provider  Tobacco  Allergies  Meds  Problems  Med Hx  Surg Hx  Fam Hx             ROS:  CONSTITUTIONAL: NEGATIVE for fever, chills, change in weight  INTEGUMENTARY/SKIN: NEGATIVE for worrisome rashes, moles or  "lesions  EYES: NEGATIVE for vision changes or irritation  ENT: NEGATIVE for ear, mouth and throat problems  RESP: NEGATIVE for significant cough or SOB  CV: NEGATIVE for chest pain, palpitations or peripheral edema  GI: NEGATIVE for nausea, abdominal pain, heartburn, or change in bowel habits   male: negative for dysuria, hematuria, decreased urinary stream, erectile dysfunction, urethral discharge  MUSCULOSKELETAL: NEGATIVE for significant arthralgias or myalgia  NEURO: NEGATIVE for weakness, dizziness or paresthesias  PSYCHIATRIC: NEGATIVE for changes in mood or affect    OBJECTIVE:   /78 (BP Location: Right arm, Cuff Size: Adult Regular)   Pulse 91   Temp 97.5  F (36.4  C) (Tympanic)   Ht 1.854 m (6' 1\")   Wt 88 kg (194 lb)   SpO2 97%   BMI 25.60 kg/m    EXAM:  GENERAL: healthy, alert and no distress  EYES: Eyes grossly normal to inspection, PERRL and conjunctivae and sclerae normal  HENT: ear canals and TM's normal, nose and mouth without ulcers or lesions  NECK: no adenopathy, no asymmetry, masses, or scars and thyroid normal to palpation  RESP: lungs clear to auscultation - no rales, rhonchi or wheezes  CV: regular rate and rhythm, normal S1 S2, no S3 or S4, no murmur, click or rub, no peripheral edema and peripheral pulses strong  ABDOMEN: soft, nontender, no hepatosplenomegaly, no masses and bowel sounds normal  MS: no gross musculoskeletal defects noted, no edema  SKIN: no suspicious lesions or rashes  NEURO: Normal strength and tone, mentation intact and speech normal  PSYCH: mentation appears normal, affect normal/bright    Diagnostic Test Results:  Labs reviewed in Epic    ASSESSMENT/PLAN:   Salas was seen today for physical.    Diagnoses and all orders for this visit:    Routine general medical examination at a health care facility    Screening for deficiency anemia  -     CBC with platelets and differential    Screening for diabetes mellitus  -     Comprehensive metabolic " "panel    Screening for lipid disorders  -     Lipid panel reflex to direct LDL Fasting    Screening for prostate cancer  -     Prostate spec antigen screen    Screening for colon cancer  -     Fecal colorectal cancer screen FIT; Future        Patient has been advised of split billing requirements and indicates understanding: Yes  COUNSELING:  Reviewed preventive health counseling, as reflected in patient instructions    Estimated body mass index is 25.6 kg/m  as calculated from the following:    Height as of this encounter: 1.854 m (6' 1\").    Weight as of this encounter: 88 kg (194 lb).        He reports that he has been smoking. He has never used smokeless tobacco.  Tobacco Cessation Action Plan:   Information offered: Patient not interested at this time      Counseling Resources:  ATP IV Guidelines  Pooled Cohorts Equation Calculator  FRAX Risk Assessment  ICSI Preventive Guidelines  Dietary Guidelines for Americans, 2010  USDA's MyPlate  ASA Prophylaxis  Lung CA Screening    Jj Hale NP  [facility]  "

## 2020-11-18 NOTE — LETTER
November 19, 2020      Salas JOSE ELIAS Macias  [address] [address]        Dear ,    We are writing to inform you of your test results.    I have reviewed your labs.     - Your prostate cancer screening lab is normal.     - Your lipid (cholesterol) panel looks great.     - Your liver function, kidney function, and electrolytes are normal.     - Your fasting blood sugar is normal. No concern for diabetes at this time.     - Your complete blood count is stable. The previous abnormal finding that you brought in on your lab result form was no longer present.     These labs look great, Salas. We can check them again next year. Reach out if you need anything or have any questions.    Resulted Orders   CBC with platelets and differential   Result Value Ref Range    WBC 8.2 4.0 - 11.0 10e9/L    RBC Count 4.73 4.4 - 5.9 10e12/L    Hemoglobin 13.9 13.3 - 17.7 g/dL    Hematocrit 42.9 40.0 - 53.0 %    MCV 91 78 - 100 fl    MCH 29.4 26.5 - 33.0 pg    MCHC 32.4 31.5 - 36.5 g/dL    RDW 15.7 (H) 10.0 - 15.0 %    Platelet Count 186 150 - 450 10e9/L    % Neutrophils 56.2 %    % Lymphocytes 31.0 %    % Monocytes 11.6 %    % Eosinophils 1.1 %    % Basophils 0.1 %    Absolute Neutrophil 4.6 1.6 - 8.3 10e9/L    Absolute Lymphocytes 2.5 0.8 - 5.3 10e9/L    Absolute Monocytes 1.0 0.0 - 1.3 10e9/L    Absolute Eosinophils 0.1 0.0 - 0.7 10e9/L    Absolute Basophils 0.0 0.0 - 0.2 10e9/L    Diff Method Automated Method    Lipid panel reflex to direct LDL Fasting   Result Value Ref Range    Cholesterol 148 <200 mg/dL    Triglycerides 112 <150 mg/dL      Comment:      Fasting specimen    HDL Cholesterol 45 >39 mg/dL    LDL Cholesterol Calculated 81 <100 mg/dL      Comment:      Desirable:       <100 mg/dl    Non HDL Cholesterol 103 <130 mg/dL   Prostate spec antigen screen   Result Value Ref Range    PSA 1.14 0 - 4 ug/L      Comment:      Assay Method:  Chemiluminescence using Siemens Vista analyzer   Comprehensive  metabolic panel   Result Value Ref Range    Sodium 138 133 - 144 mmol/L    Potassium 4.2 3.4 - 5.3 mmol/L    Chloride 106 94 - 109 mmol/L    Carbon Dioxide 27 20 - 32 mmol/L    Anion Gap 5 3 - 14 mmol/L    Glucose 85 70 - 99 mg/dL      Comment:      Fasting specimen    Urea Nitrogen 8 7 - 30 mg/dL    Creatinine 0.86 0.66 - 1.25 mg/dL    GFR Estimate >90 >60 mL/min/[1.73_m2]      Comment:      Non  GFR Calc  Starting 12/18/2018, serum creatinine based estimated GFR (eGFR) will be   calculated using the Chronic Kidney Disease Epidemiology Collaboration   (CKD-EPI) equation.      GFR Estimate If Black >90 >60 mL/min/[1.73_m2]      Comment:       GFR Calc  Starting 12/18/2018, serum creatinine based estimated GFR (eGFR) will be   calculated using the Chronic Kidney Disease Epidemiology Collaboration   (CKD-EPI) equation.      Calcium 8.7 8.5 - 10.1 mg/dL    Bilirubin Total 0.2 0.2 - 1.3 mg/dL    Albumin 3.7 3.4 - 5.0 g/dL    Protein Total 7.3 6.8 - 8.8 g/dL    Alkaline Phosphatase 102 40 - 150 U/L    ALT 23 0 - 70 U/L    AST 19 0 - 45 U/L       If you have any questions or concerns, please call the clinic at the number listed above.       Sincerely,        Jj Hale NP

## 2020-11-19 LAB
ALBUMIN SERPL-MCNC: 3.7 G/DL (ref 3.4–5)
ALP SERPL-CCNC: 102 U/L (ref 40–150)
ALT SERPL W P-5'-P-CCNC: 23 U/L (ref 0–70)
ANION GAP SERPL CALCULATED.3IONS-SCNC: 5 MMOL/L (ref 3–14)
AST SERPL W P-5'-P-CCNC: 19 U/L (ref 0–45)
BILIRUB SERPL-MCNC: 0.2 MG/DL (ref 0.2–1.3)
BUN SERPL-MCNC: 8 MG/DL (ref 7–30)
CALCIUM SERPL-MCNC: 8.7 MG/DL (ref 8.5–10.1)
CHLORIDE SERPL-SCNC: 106 MMOL/L (ref 94–109)
CHOLEST SERPL-MCNC: 148 MG/DL
CO2 SERPL-SCNC: 27 MMOL/L (ref 20–32)
CREAT SERPL-MCNC: 0.86 MG/DL (ref 0.66–1.25)
GFR SERPL CREATININE-BSD FRML MDRD: >90 ML/MIN/{1.73_M2}
GLUCOSE SERPL-MCNC: 85 MG/DL (ref 70–99)
HDLC SERPL-MCNC: 45 MG/DL
LDLC SERPL CALC-MCNC: 81 MG/DL
NONHDLC SERPL-MCNC: 103 MG/DL
POTASSIUM SERPL-SCNC: 4.2 MMOL/L (ref 3.4–5.3)
PROT SERPL-MCNC: 7.3 G/DL (ref 6.8–8.8)
PSA SERPL-ACNC: 1.14 UG/L (ref 0–4)
SODIUM SERPL-SCNC: 138 MMOL/L (ref 133–144)
TRIGL SERPL-MCNC: 112 MG/DL

## 2020-11-19 ASSESSMENT — ANXIETY QUESTIONNAIRES: GAD7 TOTAL SCORE: 1

## 2020-11-30 LAB — HEMOCCULT STL QL IA: NEGATIVE

## 2020-12-30 ENCOUNTER — OFFICE VISIT (OUTPATIENT)
Dept: FAMILY MEDICINE | Facility: CLINIC | Age: 55
End: 2020-12-30
Payer: COMMERCIAL

## 2020-12-30 VITALS
HEIGHT: 73 IN | TEMPERATURE: 98.3 F | HEART RATE: 72 BPM | WEIGHT: 195 LBS | DIASTOLIC BLOOD PRESSURE: 82 MMHG | OXYGEN SATURATION: 95 % | BODY MASS INDEX: 25.84 KG/M2 | SYSTOLIC BLOOD PRESSURE: 130 MMHG

## 2020-12-30 DIAGNOSIS — N52.9 ERECTILE DYSFUNCTION, UNSPECIFIED ERECTILE DYSFUNCTION TYPE: ICD-10-CM

## 2020-12-30 DIAGNOSIS — L85.3 DRY SKIN: ICD-10-CM

## 2020-12-30 DIAGNOSIS — R61 EXCESSIVE SWEATING: Primary | ICD-10-CM

## 2020-12-30 LAB
BASOPHILS # BLD AUTO: 0 10E9/L (ref 0–0.2)
BASOPHILS NFR BLD AUTO: 0.1 %
DIFFERENTIAL METHOD BLD: NORMAL
EOSINOPHIL # BLD AUTO: 0.1 10E9/L (ref 0–0.7)
EOSINOPHIL NFR BLD AUTO: 0.9 %
ERYTHROCYTE [DISTWIDTH] IN BLOOD BY AUTOMATED COUNT: 15 % (ref 10–15)
HCT VFR BLD AUTO: 40.9 % (ref 40–53)
HGB BLD-MCNC: 13.3 G/DL (ref 13.3–17.7)
LYMPHOCYTES # BLD AUTO: 2.9 10E9/L (ref 0.8–5.3)
LYMPHOCYTES NFR BLD AUTO: 35 %
MCH RBC QN AUTO: 29.5 PG (ref 26.5–33)
MCHC RBC AUTO-ENTMCNC: 32.5 G/DL (ref 31.5–36.5)
MCV RBC AUTO: 91 FL (ref 78–100)
MONOCYTES # BLD AUTO: 1 10E9/L (ref 0–1.3)
MONOCYTES NFR BLD AUTO: 12.2 %
NEUTROPHILS # BLD AUTO: 4.2 10E9/L (ref 1.6–8.3)
NEUTROPHILS NFR BLD AUTO: 51.8 %
PLATELET # BLD AUTO: 168 10E9/L (ref 150–450)
RBC # BLD AUTO: 4.51 10E12/L (ref 4.4–5.9)
WBC # BLD AUTO: 8.2 10E9/L (ref 4–11)

## 2020-12-30 PROCEDURE — 99214 OFFICE O/P EST MOD 30 MIN: CPT | Performed by: NURSE PRACTITIONER

## 2020-12-30 PROCEDURE — 85025 COMPLETE CBC W/AUTO DIFF WBC: CPT | Performed by: NURSE PRACTITIONER

## 2020-12-30 PROCEDURE — 84443 ASSAY THYROID STIM HORMONE: CPT | Performed by: NURSE PRACTITIONER

## 2020-12-30 PROCEDURE — 36415 COLL VENOUS BLD VENIPUNCTURE: CPT | Performed by: NURSE PRACTITIONER

## 2020-12-30 RX ORDER — SILDENAFIL 50 MG/1
50 TABLET, FILM COATED ORAL DAILY PRN
Qty: 30 TABLET | Refills: 3 | Status: SHIPPED | OUTPATIENT
Start: 2020-12-30 | End: 2023-09-20

## 2020-12-30 ASSESSMENT — MIFFLIN-ST. JEOR: SCORE: 1778.39

## 2020-12-30 NOTE — PATIENT INSTRUCTIONS
1. Eucerin moisturizer. Use this twice a day on your feet.     2. Get a pumice stone and use it on your feet.     3. Try sildenafil 30-60 minutes before sex    4. I'll follow up with lab results    5. Mention sweating to psychiatrist

## 2020-12-30 NOTE — LETTER
January 4, 2021      Salas Macias  [address] [address]        Dear ,    We are writing to inform you of your test results.    Your test results fall within the expected range(s) or remain unchanged from previous results.  Please continue with current treatment plan.    Resulted Orders   TSH with free T4 reflex   Result Value Ref Range    TSH 1.32 0.40 - 4.00 mU/L   CBC with platelets and differential   Result Value Ref Range    WBC 8.2 4.0 - 11.0 10e9/L    RBC Count 4.51 4.4 - 5.9 10e12/L    Hemoglobin 13.3 13.3 - 17.7 g/dL    Hematocrit 40.9 40.0 - 53.0 %    MCV 91 78 - 100 fl    MCH 29.5 26.5 - 33.0 pg    MCHC 32.5 31.5 - 36.5 g/dL    RDW 15.0 10.0 - 15.0 %    Platelet Count 168 150 - 450 10e9/L    % Neutrophils 51.8 %    % Lymphocytes 35.0 %    % Monocytes 12.2 %    % Eosinophils 0.9 %    % Basophils 0.1 %    Absolute Neutrophil 4.2 1.6 - 8.3 10e9/L    Absolute Lymphocytes 2.9 0.8 - 5.3 10e9/L    Absolute Monocytes 1.0 0.0 - 1.3 10e9/L    Absolute Eosinophils 0.1 0.0 - 0.7 10e9/L    Absolute Basophils 0.0 0.0 - 0.2 10e9/L    Diff Method Automated Method        If you have any questions or concerns, please call the clinic at the number listed above.       Sincerely,      Jj Hale NP

## 2020-12-30 NOTE — PROGRESS NOTES
Subjective     Salas Macias is a 55 year old male who presents to clinic today for the following health issues:    HPI         Depression and Anxiety Follow-Up    How are you doing with your depression since your last visit? Worsened     How are you doing with your anxiety since your last visit?  Worsened     Are you having other symptoms that might be associated with depression or anxiety? No    Have you had a significant life event? No     Do you have any concerns with your use of alcohol or other drugs? No    Social History     Tobacco Use     Smoking status: Current Every Day Smoker     Smokeless tobacco: Never Used   Substance Use Topics     Alcohol use: No     Drug use: No     PHQ 6/20/2019 12/4/2019 11/18/2020   PHQ-9 Total Score 11 18 14   Q9: Thoughts of better off dead/self-harm past 2 weeks Not at all Not at all Not at all     LAMAR-7 SCORE 6/20/2019 11/18/2020   Total Score 3 1       Suicide Assessment Five-step Evaluation and Treatment (SAFE-T)      How many servings of fruits and vegetables do you eat daily?  2-3    On average, how many sweetened beverages do you drink each day (Examples: soda, juice, sweet tea, etc.  Do NOT count diet or artificially sweetened beverages)?   0    How many days per week do you exercise enough to make your heart beat faster? 3 or less    How many minutes a day do you exercise enough to make your heart beat faster? 10 - 19    How many days per week do you miss taking your medication? 0    HPI:    1. Excessive sweating (night and day). Mainly face and chest. This has been occurring for over 5 years. On fluoxetine and olanzapine. No fever, chills, body aches, unintentional weight changes, unexplained pain, weakness, fatigue.    2. Dry feet with peeling skin. Not itchy. This has been ongoing for several years.     3. Erectile dysfunction. Hasn't been able to get an erection for several years. Has normal sex drive. No diabetes or HTN. No urinary symptoms or obvious  "abnormal  anatomic / structural issues.     Review of Systems   Constitutional,  , neuro, skin, endocrine and psych systems are negative, except as otherwise noted.      Objective    /82   Pulse 72   Temp 98.3  F (36.8  C) (Tympanic)   Ht 1.854 m (6' 1\")   Wt 88.5 kg (195 lb)   SpO2 95%   BMI 25.73 kg/m    Body mass index is 25.73 kg/m .  Physical Exam   GENERAL: healthy, alert and no distress  HENT: ear canals and TM's normal, nose and mouth without ulcers or lesions  NECK: no adenopathy, no asymmetry, masses, or scars and thyroid normal to palpation  RESP: lungs clear to auscultation - no rales, rhonchi or wheezes  CV: regular rate and rhythm, normal S1 S2, no S3 or S4, no murmur, click or rub, no peripheral edema and peripheral pulses strong  SKIN: Flaky, non-inflamed, dry skin over soles of both feet. No bleeding or drainage.   NEURO: Normal strength and tone, mentation intact and speech normal  PSYCH: mentation appears normal, affect normal/bright    Results for orders placed or performed in visit on 12/30/20 (from the past 24 hour(s))   CBC with platelets and differential   Result Value Ref Range    WBC 8.2 4.0 - 11.0 10e9/L    RBC Count 4.51 4.4 - 5.9 10e12/L    Hemoglobin 13.3 13.3 - 17.7 g/dL    Hematocrit 40.9 40.0 - 53.0 %    MCV 91 78 - 100 fl    MCH 29.5 26.5 - 33.0 pg    MCHC 32.5 31.5 - 36.5 g/dL    RDW 15.0 10.0 - 15.0 %    Platelet Count 168 150 - 450 10e9/L    % Neutrophils 51.8 %    % Lymphocytes 35.0 %    % Monocytes 12.2 %    % Eosinophils 0.9 %    % Basophils 0.1 %    Absolute Neutrophil 4.2 1.6 - 8.3 10e9/L    Absolute Lymphocytes 2.9 0.8 - 5.3 10e9/L    Absolute Monocytes 1.0 0.0 - 1.3 10e9/L    Absolute Eosinophils 0.1 0.0 - 0.7 10e9/L    Absolute Basophils 0.0 0.0 - 0.2 10e9/L    Diff Method Automated Method            Assessment & Plan     Salas was seen today for recheck medication.    Diagnoses and all orders for this visit:    Excessive sweating  Comment: Not " exclusively night sweats, and he is without other constitutional symptoms. Duration has been over 5 years, as well, so doubt lymphoma or hematologic malignancy. Will recheck CBC and check TSH, as well, given that this hasn't been checked recently. Could certainly be side effect of psych medications (fluoxetine and olanzapine). Encouraged him to bring this up to his psychiatrist, as well.   -     TSH with free T4 reflex  -     CBC with platelets and differential    Erectile dysfunction, unspecified erectile dysfunction type  Comment: No evidence of organic etiology. No DM or HTN. Could certainly be related to psych medications. Will trial sildenafil briefly to see if this helps.   -     sildenafil (VIAGRA) 50 MG tablet; Take 1 tablet (50 mg) by mouth daily as needed (erectile dysfunction)    Dry skin  Comment: No evidence of dermatitis and not pruritic. Appears to be simple dry skin. Encouraged soaks, debridement of dry skin with pumice stone, and twice daily application of thick emollient (like Eucerin). Follow up if no better after a couple weeks, and I can have him see skin clinic.             See Patient Instructions    Return in about 4 weeks (around 1/27/2021) for persistent or worsening symptoms.    Jj Hale NP  [facility]

## 2020-12-31 LAB — TSH SERPL DL<=0.005 MIU/L-ACNC: 1.32 MU/L (ref 0.4–4)

## 2021-01-14 ENCOUNTER — HOSPITAL ENCOUNTER (EMERGENCY)
Facility: CLINIC | Age: 56
Discharge: HOME | End: 2021-01-14
Attending: EMERGENCY MEDICINE | Admitting: EMERGENCY MEDICINE
Payer: COMMERCIAL

## 2021-01-14 VITALS
SYSTOLIC BLOOD PRESSURE: 133 MMHG | BODY MASS INDEX: 26.1 KG/M2 | WEIGHT: 197.8 LBS | TEMPERATURE: 98.5 F | HEART RATE: 80 BPM | OXYGEN SATURATION: 100 % | DIASTOLIC BLOOD PRESSURE: 88 MMHG | RESPIRATION RATE: 16 BRPM

## 2021-01-14 DIAGNOSIS — Z76.89 ENCOUNTER FOR ASSESSMENT OF ALCOHOL AND DRUG USE: ICD-10-CM

## 2021-01-14 PROCEDURE — 99282 EMERGENCY DEPT VISIT SF MDM: CPT

## 2021-01-14 NOTE — ED TRIAGE NOTES
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 Pt was brought in by a friend who he states \"thinks it's not helping and thinks she knows everything about drugs.\" Pt advised that no information should be passed onto her. Incidentally, pt was found to have oxygen saturation of 89%, states he \"sometimes\" feels SOB but attributes it to the \"cigerettes.\" He otherwise feels well but also reports he has lost his sense of taste. Pt alert, oriented x3 ABCS intact  " No

## 2021-01-14 NOTE — ED PROVIDER NOTES
History   Chief Complaint:  Drug / Alcohol Assessment     HPI   Salas Macias is a 55 year old male with history of depression and paranoid schizophrenia who presents with drug and alcohol assessment. Patient reports that he thinks that he is doing really well recently. He has been on methadone and has not used heroin in several months. Denies other drug use. He reports that he gets blamed for other people's problems. He says his girlfriend wanted him to be seen because she thinks he is having trouble with drugs. He reports that she is going to tell us that he has a problem. He denies SI, hallucinations. He denies any medical concerns such as fever, cough, chest pain, shortness of breath. He denies overdose of any prescription medications.    Review of Systems  Denies fever, cough, shortness of breath, chest pain, drug use  10 point review of systems was obtained and negative other than mentioned above.    Allergies:  No known drug allergies.     Medications:  Xanax  Prozac  Zyprexa  Viagra    Past Medical History:    Depression   Paranoid schizophrenia  Insomnia    Social History:  Clinic, [facility]. Current smoker.     Physical Exam     Patient Vitals for the past 24 hrs:   BP Temp Temp src Pulse Resp SpO2 Weight   01/14/21 1458 -- -- -- 80 -- 100 % --   01/14/21 1445 -- -- -- 81 -- 100 % --   01/14/21 1410 -- -- -- -- -- -- 89.7 kg (197 lb 12.8 oz)   01/14/21 1349 133/88 98.5  F (36.9  C) Temporal 87 16 (!) 87 % --     Physical Exam     General: Sitting up in bed  Eyes:  The pupils are equal and round    Conjunctivae and sclerae are normal  ENT:    Wearing a mask  Neck:  Normal range of motion  CV:  Regular rate, regular rhythm     Skin warm and well perfused   Resp:  Non labored breathing on room air    No tachypnea    No cough heard    Lungs clear bilaterally  GI:  Abdomen is soft, there is no rigidity    No distension    No rebound tenderness     No abdominal  tenderness  MS:  Normal muscular tone  Skin:  No rash or acute skin lesions noted  Neuro:   Awake, alert.      Speech is normal and fluent.    Face is symmetric.     Moves all extremities equally    Oriented x3    Steady gait    No confusion or lethargy  Psych: Normal affect.  Appropriate interactions.  No agitation    Emergency Department Course     Emergency Department Course:  Reviewed:  I reviewed the patient's nursing notes, vitals, past medical records.    Assessments:  1414: I performed an exam of the patient as documented above and discussed plan for discharge.     Disposition:  The patient was discharged to home.     Impression & Plan     Medical Decision Making:  Salas Macias is a 55 year old male who presents to the ED with drug/alcohol assessment. Patient's girlfriend wanted him to be seen because she apparently thinks that he is having problems with drugs. He denies any current drug use. Denies any attempts at harming self or SI. He thinks that he is doing well and hasn't used heroin in several months. He is calm in the ED and alert. Does not appear to be intoxicated or disoriented. No apparent hallucinations or mental health decompensation. He denies any medical concerns or complaints. I doubt the initial oxygen saturation was accurate because while I was in the room with the patient, he remained at 99% on RA. I had him ambulate in the room and he was at 98-99% on RA. He denies any shortness of breath, chest pain and has clear lungs on ausculation. His nurse reported that he remained at % on RA while in the ED room before I evaluated the patient as well. Given this, don't think any additional workup indicated in ED. Recommended follow-up with PCP with concerns.    Diagnosis:    ICD-10-CM    1. Encounter for assessment of alcohol and drug use  Z76.89        Scribe Disclosure:  I, Marjorie Andrade, am serving as a scribe at 2:04 PM on 1/14/2021 to document services personally performed by  Kayla Joy MD based on my observations and the provider's statements to me.     I, Alice Nayak, am serving as a scribe at 3:12 PM on 1/14/2021 to document services personally performed by Kayla Joy MD based on my observations and the provider's statements to me.           Kayla Joy MD  01/15/21 0037

## 2021-01-14 NOTE — ED AVS SNAPSHOT
[facility] Emergency Dept  [address]  [facility] [address]  Phone: [phone]  Fax: [phone]                                    Salas Macias   MRN: [id number]    Department: [facility] Emergency Dept   Date of Visit: 1/14/2021           After Visit Summary Signature Page    I have received my discharge instructions, and my questions have been answered. I have discussed any challenges I see with this plan with the nurse or doctor.    ..........................................................................................................................................  Patient/Patient Representative Signature      ..........................................................................................................................................  Patient Representative Print Name and Relationship to Patient    ..................................................               ................................................  Date                                   Time    ..........................................................................................................................................  Reviewed by Signature/Title    ...................................................              ..............................................  Date                                               Time          22EPIC Rev 08/18

## 2021-03-11 ENCOUNTER — APPOINTMENT (OUTPATIENT)
Dept: GENERAL RADIOLOGY | Facility: CLINIC | Age: 56
End: 2021-03-11
Attending: EMERGENCY MEDICINE
Payer: COMMERCIAL

## 2021-03-11 ENCOUNTER — HOSPITAL ENCOUNTER (EMERGENCY)
Facility: CLINIC | Age: 56
Discharge: HOME | End: 2021-03-11
Attending: EMERGENCY MEDICINE | Admitting: EMERGENCY MEDICINE
Payer: COMMERCIAL

## 2021-03-11 VITALS
SYSTOLIC BLOOD PRESSURE: 155 MMHG | WEIGHT: 190.04 LBS | TEMPERATURE: 98.2 F | HEIGHT: 73 IN | HEART RATE: 80 BPM | BODY MASS INDEX: 25.19 KG/M2 | DIASTOLIC BLOOD PRESSURE: 80 MMHG | RESPIRATION RATE: 16 BRPM | OXYGEN SATURATION: 98 %

## 2021-03-11 DIAGNOSIS — Z20.822 SUSPECTED COVID-19 VIRUS INFECTION: ICD-10-CM

## 2021-03-11 LAB
ANION GAP SERPL CALCULATED.3IONS-SCNC: 6 MMOL/L (ref 3–14)
BASOPHILS # BLD AUTO: 0 10E9/L (ref 0–0.2)
BASOPHILS NFR BLD AUTO: 0.4 %
BUN SERPL-MCNC: 10 MG/DL (ref 7–30)
CALCIUM SERPL-MCNC: 9 MG/DL (ref 8.5–10.1)
CHLORIDE SERPL-SCNC: 107 MMOL/L (ref 94–109)
CO2 SERPL-SCNC: 26 MMOL/L (ref 20–32)
CREAT SERPL-MCNC: 0.99 MG/DL (ref 0.66–1.25)
D DIMER PPP FEU-MCNC: 0.3 UG/ML FEU (ref 0–0.5)
DIFFERENTIAL METHOD BLD: ABNORMAL
EOSINOPHIL # BLD AUTO: 0 10E9/L (ref 0–0.7)
EOSINOPHIL NFR BLD AUTO: 0.2 %
ERYTHROCYTE [DISTWIDTH] IN BLOOD BY AUTOMATED COUNT: 14.4 % (ref 10–15)
FLUAV RNA RESP QL NAA+PROBE: NEGATIVE
FLUBV RNA RESP QL NAA+PROBE: NEGATIVE
GFR SERPL CREATININE-BSD FRML MDRD: 85 ML/MIN/{1.73_M2}
GLUCOSE SERPL-MCNC: 120 MG/DL (ref 70–99)
HCT VFR BLD AUTO: 47.8 % (ref 40–53)
HGB BLD-MCNC: 15 G/DL (ref 13.3–17.7)
IMM GRANULOCYTES # BLD: 0.1 10E9/L (ref 0–0.4)
IMM GRANULOCYTES NFR BLD: 0.5 %
LABORATORY COMMENT REPORT: NORMAL
LYMPHOCYTES # BLD AUTO: 2 10E9/L (ref 0.8–5.3)
LYMPHOCYTES NFR BLD AUTO: 18.1 %
MCH RBC QN AUTO: 29.4 PG (ref 26.5–33)
MCHC RBC AUTO-ENTMCNC: 31.4 G/DL (ref 31.5–36.5)
MCV RBC AUTO: 94 FL (ref 78–100)
MONOCYTES # BLD AUTO: 0.9 10E9/L (ref 0–1.3)
MONOCYTES NFR BLD AUTO: 8.2 %
NEUTROPHILS # BLD AUTO: 8 10E9/L (ref 1.6–8.3)
NEUTROPHILS NFR BLD AUTO: 72.6 %
NRBC # BLD AUTO: 0 10*3/UL
NRBC BLD AUTO-RTO: 0 /100
PLATELET # BLD AUTO: 218 10E9/L (ref 150–450)
POTASSIUM SERPL-SCNC: 3.9 MMOL/L (ref 3.4–5.3)
RBC # BLD AUTO: 5.11 10E12/L (ref 4.4–5.9)
RSV RNA SPEC QL NAA+PROBE: NORMAL
SARS-COV-2 RNA RESP QL NAA+PROBE: NEGATIVE
SODIUM SERPL-SCNC: 139 MMOL/L (ref 133–144)
SPECIMEN SOURCE: NORMAL
TROPONIN I SERPL-MCNC: <0.015 UG/L (ref 0–0.04)
WBC # BLD AUTO: 11 10E9/L (ref 4–11)

## 2021-03-11 PROCEDURE — 93005 ELECTROCARDIOGRAM TRACING: CPT

## 2021-03-11 PROCEDURE — 99285 EMERGENCY DEPT VISIT HI MDM: CPT | Mod: 25

## 2021-03-11 PROCEDURE — C9803 HOPD COVID-19 SPEC COLLECT: HCPCS

## 2021-03-11 PROCEDURE — 85379 FIBRIN DEGRADATION QUANT: CPT | Performed by: EMERGENCY MEDICINE

## 2021-03-11 PROCEDURE — 71045 X-RAY EXAM CHEST 1 VIEW: CPT

## 2021-03-11 PROCEDURE — 84484 ASSAY OF TROPONIN QUANT: CPT | Performed by: EMERGENCY MEDICINE

## 2021-03-11 PROCEDURE — 87636 SARSCOV2 & INF A&B AMP PRB: CPT | Performed by: EMERGENCY MEDICINE

## 2021-03-11 PROCEDURE — 80048 BASIC METABOLIC PNL TOTAL CA: CPT | Performed by: EMERGENCY MEDICINE

## 2021-03-11 PROCEDURE — 85025 COMPLETE CBC W/AUTO DIFF WBC: CPT | Performed by: EMERGENCY MEDICINE

## 2021-03-11 ASSESSMENT — ENCOUNTER SYMPTOMS
COUGH: 1
NAUSEA: 0
VOMITING: 0
DIARRHEA: 0
FEVER: 0
ABDOMINAL PAIN: 0
SHORTNESS OF BREATH: 1

## 2021-03-11 ASSESSMENT — MIFFLIN-ST. JEOR: SCORE: 1755.88

## 2021-03-11 NOTE — ED PROVIDER NOTES
"  History   Chief Complaint:  Shortness of breath     HPI   Salas Macias is a 55 year old male who presents with shortness of breath. Patient states he has been feeling short of breath for two days which worsens when walking. He also has an associated dry cough. 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 Denies nausea, fever, vomiting, abdominal pain, chest pain or diarrhea. Denies history of blood clots or cancer. Denies alcohol or drug use.     Review of Systems   Constitutional: Negative for fever.   Respiratory: Positive for cough and shortness of breath.    Gastrointestinal: Negative for abdominal pain, diarrhea, nausea and vomiting.   All other systems reviewed and are negative.    Allergies:  No known drug allergies    Medications:  Xanax  Prozac  Zyprexa   Sildenafil     Past Medical History:    Depression   Paranoid Schizophrenia   Insomnia     Social History:  Arrives unaccompanied.     Physical Exam     Patient Vitals for the past 24 hrs:   BP Temp Temp src Pulse Resp SpO2 Height Weight   03/11/21 1610 (!) 155/80 -- -- 80 16 98 % -- --   03/11/21 1520 (!) 164/85 -- -- 82 -- -- -- --   03/11/21 1500 (!) 138/93 -- -- 86 18 98 % -- --   03/11/21 1445 (!) 146/87 -- -- -- -- -- -- --   03/11/21 1440 -- -- -- 83 -- -- -- --   03/11/21 1420 -- -- -- 98 -- -- -- --   03/11/21 1415 (!) 141/87 -- -- -- -- -- -- --   03/11/21 1350 (!) 181/96 -- -- 109 -- -- -- --   03/11/21 1332 (!) 181/122 98.2  F (36.8  C) Oral 119 18 98 % 1.854 m (6' 1\") 86.2 kg (190 lb 0.6 oz)       Physical Exam  Nursing note and vitals reviewed.  Constitutional: Well nourished.   Eyes: Conjunctiva normal.  Pupils are equal, round, and reactive to light.   ENT: Nose normal. Mucous membranes pink and moist.    Neck: Normal range of motion.  CVS: Sinus tachycardia.  Normal heart sounds.  No murmur.  Pulmonary: Lungs clear to auscultation bilaterally. No " wheezes/rales/rhonchi.  GI: Abdomen soft. Nontender, nondistended. No rigidity or guarding.    MSK: No calf tenderness or swelling.  Neuro: Alert. Follows simple commands.  Skin: Skin is warm and dry. No rash noted.   Psychiatric: Normal affect.       Emergency Department Course   ECG  ECG taken at 1350, ECG read at 1350  Sinus tachycardia   Otherwise normal ECG    Rate 109 bpm. UT interval 138 ms. QRS duration 106 ms. QT/QTc 314/422 ms. P-R-T axes 71 46 26.     Imaging:  XR Chest Port 1 View:  IMPRESSION: Negative chest. Lungs clear. No pneumothorax.    Laboratory:  CBC: WBC 11.0, HGB 15.0,    BMP: Glucose 120 (H), o/w WNL (Creatinine: 0.99)    D Dimer (Resulted 1435): 0.3  Troponin (Resulted 1454): <0.015    Symptomatic Influenza A/B & SARS-COVIS-19 Virus PCR: Negative    Emergency Department Course:    Reviewed:  I reviewed nursing notes and past medical history    Assessments:  1350 I obtained history and examined the patient as noted above.     Disposition:  Care of the patient was transferred to my colleague Dr. Smith pending chest x-ray.       Impression & Plan     Medical Decision Making:  Patient is a 56 yo male presenting with dyspnea and cough.  He has known COVID 19 exposure.  He is hypertensive on arrival though this downtrended during patient's time in the ED.  EKG without focal ischemia or underlying arrhythmia. Screening troponin negative.  He denies active chest pain, I doubt ACS.  D dimer negative, low suspicion for PE.  CXR without focal pneumonia, fluid overload, widened mediastinum or pneumothorax.  No evidence of profound anemia or sepsis on labs.  He ambulates without hypoxia.  He tested negative today though I counseled patient this may be false negative given tested early in symptom course.  Continue to monitor for worsening dyspnea, chest pain or worsening symptoms.  He is agreeable to plan of care with close outpatient f/u.      Covid-19  Salas Mcaias was evaluated during a  global COVID-19 pandemic, which necessitated consideration that the patient might be at risk for infection with the SARS-CoV-2 virus that causes COVID-19.   Applicable protocols for evaluation were followed during the patient's care. COVID-19 was considered as part of the patient's evaluation. The plan for testing is: a test was obtained during this visit.    Diagnosis:    ICD-10-CM    1. Suspected COVID-19 virus infection  Z20.822        Scribe Disclosure:  I, Israel Snow, am serving as a scribe at 1:36 PM on 3/11/2021 to document services personally performed by Beckie Chow DO based on my observations and the provider's statements to me.            Beckie Chow DO  03/11/21 1741

## 2021-03-11 NOTE — DISCHARGE INSTRUCTIONS
Discharge Instructions  COVID-19    COVID-19 is the disease caused by a new coronavirus. The virus spreads from person-to-person primarily by droplets when an infected person coughs or sneezes and the droplet either lands on another person or that other person touches a surface with the droplet on it. There are tests available to diagnose COVID-19. There is no specific treatment or medicine for the disease.    You may have been diagnosed with COVID, may be being tested for COVID and have a pending test result, or may have been exposed to COVID.    Symptoms of COVID-19    Many people have no symptoms or mild symptoms.  Symptoms may usually appear 4 to 5 days (up to 14 days) after contact with a person with COVID-19. Some people will get severe symptoms and pneumonia. Usual symptoms are:     ? Fever  ? Cough  ? Trouble breathing    Less common symptoms are: Headache, body aches, sore throat, sneezing, diarrhea, loss of taste or smell.    Isolation and Quarantine    You were seen because you have symptoms, had an exposure, or had some other concern about possible COVID. The best way to stop the spread of the virus is to avoid contact with others.  Isolation refers to sick people staying away from people who are not sick. A person in quarantine is limiting activity because they were exposed and are waiting to see if they might become sick.    If you test positive for COVID, you should stay home (isolation) for at least 10 days after your symptoms began, and for 24 hours with no fever and improvement of symptoms--whichever is longer. (Your fever should be gone for 24 hours without using fever-reducing medicine). If you have no symptoms, you should stay home (isolation) for 10 days from the day of the test.    For example, if you have a fever and cough for 6 days, you need to stay home 4 more days with no fever for a total of 10 days. Or, if you have a fever and cough for 10 days, you need to stay home one more day with  no fever for a total of 11 days.    If you have a high-risk exposure to COVID (you spent 15 minutes or more within six feet of somebody who has COVID), you should stay home (quarantine) for 14 days. Even if you test negative for COVID, the CDC recommends a 14-day quarantine from the time of your last exposure to that individual. There are options for a shortened (<14 day quarantine) you can review at:    https://www.[email or web address].[place]./diseases/coronavirus/close.html#long    If you have symptoms but a negative test, you should stay at home until you are symptom-free and without fever for 24 hours, using the same judgment you would for when it is safe to return to work/school from strep throat, influenza, or the common cold. If you worsen, you should consider being re-evaluated.    If you are being tested for COVID and your test is pending, you should stay home until you know your test result.    How should I protect myself and others?    Do not go to work or school. Have a friend or relative do your shopping. Do not use public transportation (bus, train) or ridesharing (Lyft, Uber).    Separate yourself from other people in your home. As much as possible, you should stay in one room and away from other people in your home. Also, use a separate bathroom, if possible. Avoid handling pets or other animals while sick.     Wear a facemask if you need to be around other people and cover your mouth and nose with a tissue when you cough or sneeze.     Avoid sharing personal household items. You should not share dishes, drinking glasses, forks/knives/spoons, towels, or bedding with other people in your home. After using these items, they should be washed with soap and water. Clean parts of your home that are touched often (doorknobs, faucets, countertops, etc.) daily.     Wash your hands often with soap and water for at least 20 seconds or use an alcohol-based hand  containing at least 60% alcohol.     Avoid touching  your face.    Treat your symptoms. You can take Acetaminophen (Tylenol) to treat body aches and fever as needed for comfort. Ibuprofen (Advil or Motrin) can be used as well if you still have symptoms after taking Tylenol. Drink fluids. Rest.    Watch for worsening symptoms such as shortness of breath/difficulty breathing or very severe weakness.    Employers/workplaces are being asked by the Centers for Disease Control (CDC) to not request notes/documentation for you to return to work or prove that you were ill. You may choose to show your employer this paperwork. Also, repeat testing should not be required to return to work.    Return to the Emergency Department if:    If you are developing worsening breathing, shortness of breath, or feel worse you should seek medical attention.  If you are uncertain, contact your health care provider/clinic. If you need emergency medical attention, call 911 and tell them you have been ill.

## 2021-03-11 NOTE — ED TRIAGE NOTES
"Presents with sob x \"a few days\", concerned for covid, reports he brought his grand kids in yesterday for covid testing and they were positive. ABCs intact.   "

## 2021-03-12 LAB — INTERPRETATION ECG - MUSE: NORMAL

## 2021-04-19 ENCOUNTER — OFFICE VISIT (OUTPATIENT)
Dept: FAMILY MEDICINE | Facility: CLINIC | Age: 56
End: 2021-04-19
Payer: COMMERCIAL

## 2021-04-19 VITALS
OXYGEN SATURATION: 97 % | TEMPERATURE: 98.3 F | DIASTOLIC BLOOD PRESSURE: 78 MMHG | WEIGHT: 188 LBS | SYSTOLIC BLOOD PRESSURE: 129 MMHG | BODY MASS INDEX: 24.92 KG/M2 | HEIGHT: 73 IN | HEART RATE: 104 BPM

## 2021-04-19 DIAGNOSIS — Z13.21 ENCOUNTER FOR VITAMIN DEFICIENCY SCREENING: ICD-10-CM

## 2021-04-19 DIAGNOSIS — E55.9 VITAMIN D DEFICIENCY: ICD-10-CM

## 2021-04-19 DIAGNOSIS — Z11.3 SCREEN FOR STD (SEXUALLY TRANSMITTED DISEASE): Primary | ICD-10-CM

## 2021-04-19 PROCEDURE — 87591 N.GONORRHOEAE DNA AMP PROB: CPT | Performed by: NURSE PRACTITIONER

## 2021-04-19 PROCEDURE — 99213 OFFICE O/P EST LOW 20 MIN: CPT | Performed by: NURSE PRACTITIONER

## 2021-04-19 PROCEDURE — 82306 VITAMIN D 25 HYDROXY: CPT | Performed by: NURSE PRACTITIONER

## 2021-04-19 PROCEDURE — 36415 COLL VENOUS BLD VENIPUNCTURE: CPT | Performed by: NURSE PRACTITIONER

## 2021-04-19 PROCEDURE — 82607 VITAMIN B-12: CPT | Performed by: NURSE PRACTITIONER

## 2021-04-19 PROCEDURE — 99000 SPECIMEN HANDLING OFFICE-LAB: CPT | Performed by: NURSE PRACTITIONER

## 2021-04-19 PROCEDURE — 87491 CHLMYD TRACH DNA AMP PROBE: CPT | Performed by: NURSE PRACTITIONER

## 2021-04-19 PROCEDURE — 86780 TREPONEMA PALLIDUM: CPT | Mod: 90 | Performed by: NURSE PRACTITIONER

## 2021-04-19 PROCEDURE — 87340 HEPATITIS B SURFACE AG IA: CPT | Performed by: NURSE PRACTITIONER

## 2021-04-19 PROCEDURE — 86803 HEPATITIS C AB TEST: CPT | Performed by: NURSE PRACTITIONER

## 2021-04-19 PROCEDURE — 87389 HIV-1 AG W/HIV-1&-2 AB AG IA: CPT | Performed by: NURSE PRACTITIONER

## 2021-04-19 ASSESSMENT — MIFFLIN-ST. JEOR: SCORE: 1741.64

## 2021-04-19 NOTE — PROGRESS NOTES
"    Assessment & Plan     Screen for STD (sexually transmitted disease)  - HIV Antigen Antibody Combo  - Treponema Abs w Reflex to RPR and Titer  - Hepatitis C antibody  - Hepatitis B surface antigen  - NEISSERIA GONORRHOEA PCR  - CHLAMYDIA TRACHOMATIS PCR    Encounter for vitamin deficiency screening  - Vitamin D Deficiency  - Vitamin B12    See Patient Instructions    Return in about 6 months (around 10/19/2021) for Physical Exam.    Jj Hale NP  [facility]   Salas is a 56 year old who presents for the following health issues       Concern - STD check     HPI: Salas presents today for STD check and to check vitamin B12 and vitamin D levels. He states that he is noting some fatigue lately and wants to ensure he doesn't have a sexually-transmitted infection. No discharge, dysuria, swollen lymph nodes, skin sores. Recent labs have ruled out diabetes, thyroid disease, anemia. ECG and CXR were also normal.     Review of Systems   Constitutional, , neuro and psych systems are negative, except as otherwise noted.      Objective    /78   Pulse 104   Temp 98.3  F (36.8  C) (Tympanic)   Ht 1.854 m (6' 1\")   Wt 85.3 kg (188 lb)   SpO2 97%   BMI 24.80 kg/m    Body mass index is 24.8 kg/m .  Physical Exam   GENERAL: healthy, alert and no distress  RESP: lungs clear to auscultation - no rales, rhonchi or wheezes  CV: regular rate and rhythm, normal S1 S2, no S3 or S4, no murmur, click or rub, no peripheral edema and peripheral pulses strong  NEURO: Normal strength and tone, mentation intact and speech normal  PSYCH: mentation appears normal, affect normal/bright    No results found for this or any previous visit (from the past 24 hour(s)).            "

## 2021-04-19 NOTE — PATIENT INSTRUCTIONS
Patient Education     Carpal Tunnel Syndrome    Carpal tunnel syndrome is a painful condition of the wrist and arm. It is caused by pressure on the median nerve. The median nerve is one of the nerves that give feeling and movement to the hand. It passes through a tunnel in the wrist called the carpal tunnel. This tunnel is made up of bones and ligaments. Narrowing of this tunnel or swelling of the tissues inside the tunnel puts pressure on the median nerve. This causes numbness, pins and needles, or electric shooting pains in your hand and forearm. Often the pain is worse at night and may wake you when you are asleep.  Carpal tunnel syndrome may occur during pregnancy and with use of birth control pills. It is more common in workers who must often bend their wrists. It is also common in people who work with power tools that cause strong vibrations.  Home care    Rest the painful wrist. Avoid repeated bending of the wrist back and forth. This puts pressure on the median nerve. Avoid using power tools with strong vibrations.    If you were given a splint, wear it at night while you sleep. You may also wear it during the day for comfort.    Move your fingers and wrists often to prevent stiffness.    Elevate your arms on pillows when you lie down.    Try using the unaffected hand more.    Try not to hold your wrists in a bent, downward position.    Sometimes changes in the work place may ease symptoms. If you type most of the day, it may help to change the position of your keyboard or add a wrist support. Your wrist should be in a neutral position and not bent back when typing.    You may use over-the-counter pain medicine to treat pain and inflammation, unless another medicine was prescribed. Anti-inflammatory pain medicines, such as ibuprofen or naproxen may be more effective than acetaminophen, which treats pain, but not inflammation. If you have chronic liver or kidney disease or ever had a stomach ulcer or  gastrointestinal bleeding, talk with your healthcare provider before using these medicines.    Opioid pain medicine will only give temporary relief and does not treat the problem. If pain continues, you may need a shot of a steroid drug into your wrist.    If the above methods fail, you may need surgery. This will open the carpal tunnel and release the pressure on the trapped nerve.  Follow-up care  Follow up with your healthcare provider, or as advised. If X-rays were taken, you will be notified of any new findings that may affect your care.  When to seek medical advice  Call your healthcare provider right away if any of these occur:    Pain not improving with the above treatment    Fingers or hand become cold, blue, numb, or tingly    Your whole arm becomes swollen or weak  [employer] last reviewed this educational content on 5/1/2018 2000-2021 [employer]. All rights reserved. This information is not intended as a substitute for professional medical care. Always follow your healthcare professional's instructions.           Patient Education     Understanding Carpal Tunnel Syndrome    The carpal tunnel is a narrow space inside the wrist. It is ringed by bone and a band of tough tissue called the transverse carpal ligament. A major nerve called the median nerve runs from the forearm into the hand through the carpal tunnel. Tendons also run through the carpal tunnel.  With carpal tunnel syndrome, the tendons or nearby tissues within the carpal tunnel may swell or thicken. Or the transverse carpal ligament may harden and shorten. This narrows the space in the carpal tunnel and puts pressure on the median nerve. This pressure leads to tingling and numbness of the hand and wrist. In time, the condition can make even simple tasks hard to do.  What causes carpal tunnel syndrome?  Doctors aren t entirely clear why the condition occurs. Certain things may make a person more likely to have it. These  include:    Being female    Being pregnant    Being overweight    Having diabetes or rheumatoid arthritis  Symptoms of carpal tunnel syndrome  Symptoms often come and go. At first, symptoms may occur mainly at night. Later, they may be noticed during the day as well. They may get worse with activities such as driving, reading, typing, or holding a phone. Symptoms can include:    Tingling and numbness in the hand or wrist    Sharp pain that shoots up the arm or down to the fingers    Hand stiffness or cramping, especially in the morning    Trouble making a fist    Hand weakness and clumsiness  Treatment for carpal tunnel syndrome  Certain treatments help reduce the pressure on the median nerve and relieve symptoms. Choices for treatment may include one or more of the following:    Wrist splint. This involves wearing a special brace on the wrist and hand. The splint holds the wrist straight, in a neutral position. This helps keep the carpal tunnel as open as possible.    Cortisone shots. Cortisone is a medicine that helps reduce swelling. It is injected directly into the wrist. It helps shrink tissues inside the carpal tunnel. This relieves symptoms for a time.    Pain medicines. You may take over-the-counter or prescription medicines to help reduce swelling and relieve symptoms.    Surgery. If the condition doesn t respond to other treatments and doesn t go away on its own, you may need surgery. During surgery, the surgeon cuts the transverse carpal ligament to relieve pressure on the median nerve.     When to call your healthcare provider  Call your healthcare provider right away if you have any of these:    Fever of 100.4 F (38 C) or higher, or as directed    Symptoms that don t get better, or get worse    New symptoms   Terence last reviewed this educational content on 3/10/2016    1889-8460 [employer]. All rights reserved. This information is not intended as a substitute for professional medical  care. Always follow your healthcare professional's instructions.           Patient Education     Carpal Tunnel Syndrome Prevention Tips  Carpal tunnel syndrome is a painful condition in the hand and wrist. It occurs when there is too much pressure on the median nerve at the wrist. The median nerve runs from your forearm to the palm of your hand. It may get squeezed or pressed when it passes through the carpal tunnel from your wrist to your hand. You may then feel numbness, tingling, pain, or weakness in your hand and up your forearm.  Doing the same hand activities over and over can put you at higher risk for carpal tunnel syndrome. But you can reduce your risk. Learn how to change the way you use your hands. Below are tips for at home and on the job. Also follow the hand and wrist safety policies at your workplace.      Keep your wrist in a straight (neutral) position when exercising.      Keep your wrist in neutral  Keep a straight (neutral) wrist position as often as you can. Don t use your wrist in a bent (flexed) position for long periods of time. This includes extended or twisted positions.  When you sleep, don't have your wrist flexed (don't sleep all curled up on your side). And don't put extra pressure on your wrist for long periods of time (don't sleep on your stomach with your hands under you).  Watch your   Don t use only your thumb and index finger to grasp or lift something. This can put stress on your wrist. When you can, use your whole hand and all its fingers to grasp an object.  Minimize repetition  Don t move your arms or hands the same way for long periods of time. And don't hold an object in the same way for long periods of time. Even simple, light tasks can cause injury this way. Instead, switch tasks or switch hands.  Rest your hands  Give your hands a break from time to time with a rest. Even a few minutes once an hour can help.  Reduce speed and force  Slow down when you do a forceful,  repetitive motion. This gives your wrist time to recover from the effort. Use power tools to help reduce the force.  Strengthen the muscles  Weak muscles may lead to a poor wrist or arm position. Exercises will make your hand and arm muscles stronger. This can help you keep a better position.  Terence last reviewed this educational content on 1/1/2018 2000-2021 [employer]. All rights reserved. This information is not intended as a substitute for professional medical care. Always follow your healthcare professional's instructions.

## 2021-04-20 ENCOUNTER — TELEPHONE (OUTPATIENT)
Dept: FAMILY MEDICINE | Facility: CLINIC | Age: 56
End: 2021-04-20

## 2021-04-20 LAB
C TRACH DNA SPEC QL NAA+PROBE: NEGATIVE
DEPRECATED CALCIDIOL+CALCIFEROL SERPL-MC: 8 UG/L (ref 20–75)
HBV SURFACE AG SERPL QL IA: NONREACTIVE
HCV AB SERPL QL IA: NONREACTIVE
HIV 1+2 AB+HIV1 P24 AG SERPL QL IA: NONREACTIVE
N GONORRHOEA DNA SPEC QL NAA+PROBE: NEGATIVE
SPECIMEN SOURCE: NORMAL
SPECIMEN SOURCE: NORMAL
T PALLIDUM AB SER QL: NONREACTIVE
VIT B12 SERPL-MCNC: 649 PG/ML (ref 193–986)

## 2021-04-20 RX ORDER — CHOLECALCIFEROL (VITAMIN D3) 50 MCG
1 TABLET ORAL DAILY
Qty: 90 TABLET | Refills: 3 | Status: SHIPPED | OUTPATIENT
Start: 2021-04-20

## 2021-04-20 NOTE — TELEPHONE ENCOUNTER
Patient given result message from Jj Hale NP. Patient verbalizes understanding and agrees to take vitamin D as directed.  Patient is asking why his vitamin D level is so low? Writer advised diet and lack of sunlight.  Huddled with Jj Hale NP who advised the same reasons. Also states that people with darker skin pigments absorb less vitamin D. Supplementation is the treatment. Called patient back with information from Jj Hale NP.  Marva Botello RN

## 2021-04-20 NOTE — TELEPHONE ENCOUNTER
----- Message from Jj Hale NP sent at 4/20/2021  5:33 PM CDT -----  Please call Aslas and relate the following:    I have reviewed your labs.     - Your HIV test was negative.    - Your hepatitis C test was negative.    - Your hepatitis B test was negative.    - Your chlamydia and gonorrhea tests were negative.     - Your syphilis test was negative.    - Your vitamin B12 test was normal.     - Your vitamin D level is very, very low. This may or may not be contributing to your current symptoms. I will go ahead and order the standard treatment for levels this low: 50,000 international unit(s) units once a week for 8 weeks followed by 2000 international unit(s) daily thereafter. We can check this again in 6 months or so.     Please reach out with questions or concerns.     ThanksChencho

## 2021-04-21 ENCOUNTER — TELEPHONE (OUTPATIENT)
Dept: FAMILY MEDICINE | Facility: CLINIC | Age: 56
End: 2021-04-21

## 2021-04-21 NOTE — TELEPHONE ENCOUNTER
Result message given from Chencho Hale. Pt verbalized understanding, all questions answered.     Julissa Gonsalez RN

## 2021-04-21 NOTE — TELEPHONE ENCOUNTER
----- Message from Jj Hale NP sent at 4/20/2021  5:33 PM CDT -----  Please call Salas and relate the following:    I have reviewed your labs.     - Your HIV test was negative.    - Your hepatitis C test was negative.    - Your hepatitis B test was negative.    - Your chlamydia and gonorrhea tests were negative.     - Your syphilis test was negative.    - Your vitamin B12 test was normal.     - Your vitamin D level is very, very low. This may or may not be contributing to your current symptoms. I will go ahead and order the standard treatment for levels this low: 50,000 international unit(s) units once a week for 8 weeks followed by 2000 international unit(s) daily thereafter. We can check this again in 6 months or so.     Please reach out with questions or concerns.     ThanksChencho

## 2021-08-03 ENCOUNTER — OFFICE VISIT (OUTPATIENT)
Dept: FAMILY MEDICINE | Facility: CLINIC | Age: 56
End: 2021-08-03
Payer: COMMERCIAL

## 2021-08-03 VITALS
HEART RATE: 90 BPM | OXYGEN SATURATION: 100 % | DIASTOLIC BLOOD PRESSURE: 76 MMHG | TEMPERATURE: 97.5 F | WEIGHT: 185.8 LBS | SYSTOLIC BLOOD PRESSURE: 134 MMHG | BODY MASS INDEX: 24.51 KG/M2

## 2021-08-03 DIAGNOSIS — A60.01 HERPES SIMPLEX INFECTION OF PENIS: Primary | ICD-10-CM

## 2021-08-03 DIAGNOSIS — N50.89 GENITAL LESION, MALE: ICD-10-CM

## 2021-08-03 PROCEDURE — 99213 OFFICE O/P EST LOW 20 MIN: CPT | Performed by: NURSE PRACTITIONER

## 2021-08-03 PROCEDURE — 87529 HSV DNA AMP PROBE: CPT | Performed by: NURSE PRACTITIONER

## 2021-08-03 RX ORDER — VALACYCLOVIR HYDROCHLORIDE 1 G/1
1000 TABLET, FILM COATED ORAL 2 TIMES DAILY
Qty: 20 TABLET | Refills: 0 | Status: SHIPPED | OUTPATIENT
Start: 2021-08-03 | End: 2022-02-11

## 2021-08-03 ASSESSMENT — ANXIETY QUESTIONNAIRES
1. FEELING NERVOUS, ANXIOUS, OR ON EDGE: MORE THAN HALF THE DAYS
2. NOT BEING ABLE TO STOP OR CONTROL WORRYING: SEVERAL DAYS
3. WORRYING TOO MUCH ABOUT DIFFERENT THINGS: NEARLY EVERY DAY
GAD7 TOTAL SCORE: 10
6. BECOMING EASILY ANNOYED OR IRRITABLE: SEVERAL DAYS
GAD7 TOTAL SCORE: 10
7. FEELING AFRAID AS IF SOMETHING AWFUL MIGHT HAPPEN: SEVERAL DAYS
4. TROUBLE RELAXING: SEVERAL DAYS
8. IF YOU CHECKED OFF ANY PROBLEMS, HOW DIFFICULT HAVE THESE MADE IT FOR YOU TO DO YOUR WORK, TAKE CARE OF THINGS AT HOME, OR GET ALONG WITH OTHER PEOPLE?: VERY DIFFICULT
GAD7 TOTAL SCORE: 10
5. BEING SO RESTLESS THAT IT IS HARD TO SIT STILL: SEVERAL DAYS
7. FEELING AFRAID AS IF SOMETHING AWFUL MIGHT HAPPEN: SEVERAL DAYS

## 2021-08-03 ASSESSMENT — PATIENT HEALTH QUESTIONNAIRE - PHQ9
10. IF YOU CHECKED OFF ANY PROBLEMS, HOW DIFFICULT HAVE THESE PROBLEMS MADE IT FOR YOU TO DO YOUR WORK, TAKE CARE OF THINGS AT HOME, OR GET ALONG WITH OTHER PEOPLE: VERY DIFFICULT
SUM OF ALL RESPONSES TO PHQ QUESTIONS 1-9: 13
SUM OF ALL RESPONSES TO PHQ QUESTIONS 1-9: 13

## 2021-08-03 NOTE — PROGRESS NOTES
Assessment & Plan     Herpes simplex infection of penis  - valACYclovir (VALTREX) 1000 mg tablet  Dispense: 20 tablet; Refill: 0    Genital lesion, male  - Herpes Simplex Virus 1&2 by PCR    Comment: History and exam suggest HSV infection. However, he has not been sexually active in the past 8 years, so this would have had to be contracted years ago. Will treat him empirically with antiviral but will also test vesicular fluid to confirm. He has had negative STI test panel recently (including HIV, hepatitis B and C, syphilis, gonorrhea, and chlamydia).     See Patient Instructions    Return in about 4 weeks (around 8/31/2021) for persistent or worsening symptoms.    [facility] Hale NP  [facility] [place]    Lindsay Marina is a 56 year old who presents for the following health issues     Concern - genital lesions  Therapies tried and outcome:  none     HPI: Salas presents today with the complaint of genital skin lesions. These developed a couple days ago. He is worried that he has herpes. He has not had intercourse in 8 years, but he states he has had two prior episodes of genital sores in his life (since his 20s). No fever, chills, body aches.       Review of Systems   Constitutional, , skin systems are negative, except as otherwise noted.      Objective    /76 (Cuff Size: Adult Large)   Pulse 90   Temp 97.5  F (36.4  C) (Tympanic)   Wt 84.3 kg (185 lb 12.8 oz)   SpO2 100%   BMI 24.51 kg/m    Body mass index is 24.51 kg/m .  Physical Exam   GENERAL: healthy, alert and no distress   (male): 4 distinct small ulcers over pink bases over dorsum of penis. Herpetic in appearance.   NEURO: Normal strength and tone, mentation intact and speech normal  PSYCH: mentation appears normal, affect normal/bright    No results found for this or any previous visit (from the past 24 hour(s)).            Answers for HPI/ROS submitted by the patient on 8/3/2021  If you checked off any  problems, how difficult have these problems made it for you to do your work, take care of things at home, or get along with other people?: Very difficult  PHQ9 TOTAL SCORE: 13  LAMAR 7 TOTAL SCORE: 10

## 2021-08-04 ENCOUNTER — TELEPHONE (OUTPATIENT)
Dept: FAMILY MEDICINE | Facility: CLINIC | Age: 56
End: 2021-08-04

## 2021-08-04 LAB
HSV1 DNA SPEC QL NAA+PROBE: NOT DETECTED
HSV2 DNA SPEC QL NAA+PROBE: DETECTED

## 2021-08-04 ASSESSMENT — ANXIETY QUESTIONNAIRES: GAD7 TOTAL SCORE: 10

## 2021-09-26 ENCOUNTER — HOSPITAL ENCOUNTER (EMERGENCY)
Facility: CLINIC | Age: 56
Discharge: HOME | End: 2021-09-26
Attending: EMERGENCY MEDICINE | Admitting: EMERGENCY MEDICINE
Payer: COMMERCIAL

## 2021-09-26 ENCOUNTER — APPOINTMENT (OUTPATIENT)
Dept: CT IMAGING | Facility: CLINIC | Age: 56
End: 2021-09-26
Attending: EMERGENCY MEDICINE
Payer: COMMERCIAL

## 2021-09-26 ENCOUNTER — APPOINTMENT (OUTPATIENT)
Dept: GENERAL RADIOLOGY | Facility: CLINIC | Age: 56
End: 2021-09-26
Attending: EMERGENCY MEDICINE
Payer: COMMERCIAL

## 2021-09-26 VITALS
HEIGHT: 73 IN | RESPIRATION RATE: 20 BRPM | OXYGEN SATURATION: 98 % | WEIGHT: 187 LBS | SYSTOLIC BLOOD PRESSURE: 166 MMHG | BODY MASS INDEX: 24.78 KG/M2 | DIASTOLIC BLOOD PRESSURE: 112 MMHG | HEART RATE: 85 BPM | TEMPERATURE: 98.6 F

## 2021-09-26 DIAGNOSIS — S01.81XA FACIAL LACERATION, INITIAL ENCOUNTER: ICD-10-CM

## 2021-09-26 DIAGNOSIS — S40.012A CONTUSION OF LEFT SHOULDER, INITIAL ENCOUNTER: ICD-10-CM

## 2021-09-26 DIAGNOSIS — S09.93XA FACIAL INJURY, INITIAL ENCOUNTER: ICD-10-CM

## 2021-09-26 DIAGNOSIS — S09.90XA CLOSED HEAD INJURY, INITIAL ENCOUNTER: ICD-10-CM

## 2021-09-26 PROCEDURE — 90715 TDAP VACCINE 7 YRS/> IM: CPT | Performed by: EMERGENCY MEDICINE

## 2021-09-26 PROCEDURE — 12011 RPR F/E/E/N/L/M 2.5 CM/<: CPT

## 2021-09-26 PROCEDURE — 96376 TX/PRO/DX INJ SAME DRUG ADON: CPT

## 2021-09-26 PROCEDURE — 90471 IMMUNIZATION ADMIN: CPT | Performed by: EMERGENCY MEDICINE

## 2021-09-26 PROCEDURE — 99285 EMERGENCY DEPT VISIT HI MDM: CPT | Mod: 25

## 2021-09-26 PROCEDURE — U0005 INFEC AGEN DETEC AMPLI PROBE: HCPCS | Performed by: EMERGENCY MEDICINE

## 2021-09-26 PROCEDURE — 96374 THER/PROPH/DIAG INJ IV PUSH: CPT

## 2021-09-26 PROCEDURE — 70486 CT MAXILLOFACIAL W/O DYE: CPT

## 2021-09-26 PROCEDURE — 71046 X-RAY EXAM CHEST 2 VIEWS: CPT

## 2021-09-26 PROCEDURE — C9803 HOPD COVID-19 SPEC COLLECT: HCPCS

## 2021-09-26 PROCEDURE — 73130 X-RAY EXAM OF HAND: CPT | Mod: 50

## 2021-09-26 PROCEDURE — 70450 CT HEAD/BRAIN W/O DYE: CPT

## 2021-09-26 PROCEDURE — 73030 X-RAY EXAM OF SHOULDER: CPT | Mod: LT

## 2021-09-26 PROCEDURE — 250N000011 HC RX IP 250 OP 636: Performed by: EMERGENCY MEDICINE

## 2021-09-26 RX ORDER — LIDOCAINE HYDROCHLORIDE AND EPINEPHRINE 10; 10 MG/ML; UG/ML
INJECTION, SOLUTION INFILTRATION; PERINEURAL
Status: DISCONTINUED
Start: 2021-09-26 | End: 2021-09-27 | Stop reason: HOSPADM

## 2021-09-26 RX ORDER — HYDROCODONE BITARTRATE AND ACETAMINOPHEN 5; 325 MG/1; MG/1
1-2 TABLET ORAL EVERY 6 HOURS PRN
Qty: 10 TABLET | Refills: 0 | Status: SHIPPED | OUTPATIENT
Start: 2021-09-26 | End: 2021-10-26

## 2021-09-26 RX ORDER — ERYTHROMYCIN 5 MG/G
0.5 OINTMENT OPHTHALMIC AT BEDTIME
Qty: 3.5 G | Refills: 0 | Status: SHIPPED | OUTPATIENT
Start: 2021-09-26 | End: 2021-10-03

## 2021-09-26 RX ORDER — CEPHALEXIN 500 MG/1
500 CAPSULE ORAL 4 TIMES DAILY
Qty: 20 CAPSULE | Refills: 0 | Status: SHIPPED | OUTPATIENT
Start: 2021-09-26 | End: 2021-10-01

## 2021-09-26 RX ORDER — GINSENG 100 MG
CAPSULE ORAL ONCE
Status: DISCONTINUED | OUTPATIENT
Start: 2021-09-26 | End: 2021-09-27 | Stop reason: HOSPADM

## 2021-09-26 RX ORDER — HYDROMORPHONE HYDROCHLORIDE 1 MG/ML
0.5 INJECTION, SOLUTION INTRAMUSCULAR; INTRAVENOUS; SUBCUTANEOUS
Status: DISCONTINUED | OUTPATIENT
Start: 2021-09-26 | End: 2021-09-27 | Stop reason: HOSPADM

## 2021-09-26 RX ADMIN — HYDROMORPHONE HYDROCHLORIDE 0.5 MG: 1 INJECTION, SOLUTION INTRAMUSCULAR; INTRAVENOUS; SUBCUTANEOUS at 18:41

## 2021-09-26 RX ADMIN — CLOSTRIDIUM TETANI TOXOID ANTIGEN (FORMALDEHYDE INACTIVATED), CORYNEBACTERIUM DIPHTHERIAE TOXOID ANTIGEN (FORMALDEHYDE INACTIVATED), BORDETELLA PERTUSSIS TOXOID ANTIGEN (GLUTARALDEHYDE INACTIVATED), BORDETELLA PERTUSSIS FILAMENTOUS HEMAGGLUTININ ANTIGEN (FORMALDEHYDE INACTIVATED), BORDETELLA PERTUSSIS PERTACTIN ANTIGEN, AND BORDETELLA PERTUSSIS FIMBRIAE 2/3 ANTIGEN 0.5 ML: 5; 2; 2.5; 5; 3; 5 INJECTION, SUSPENSION INTRAMUSCULAR at 18:47

## 2021-09-26 RX ADMIN — HYDROMORPHONE HYDROCHLORIDE 0.5 MG: 1 INJECTION, SOLUTION INTRAMUSCULAR; INTRAVENOUS; SUBCUTANEOUS at 21:11

## 2021-09-26 ASSESSMENT — MIFFLIN-ST. JEOR: SCORE: 1737.11
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
## 2021-09-27 ENCOUNTER — TELEPHONE (OUTPATIENT)
Dept: OPHTHALMOLOGY | Facility: CLINIC | Age: 56
End: 2021-09-27

## 2021-09-27 ENCOUNTER — PATIENT OUTREACH (OUTPATIENT)
Dept: FAMILY MEDICINE | Facility: CLINIC | Age: 56
End: 2021-09-27

## 2021-09-27 LAB — SARS-COV-2 RNA RESP QL NAA+PROBE: NEGATIVE

## 2021-09-27 NOTE — DISCHARGE INSTRUCTIONS
Please cover your wounds of the face and extremities with bacitracin ointment and cover them to help with wound healing.  Please use the erythromycin ointment along the lower eyelid at nighttime.  During the day you can apply artificial tears to the left eye.  Take the antibiotics to prevent infection.  Follow-up with ophthalmology at [facility].    Discharge Instructions  Laceration (Cut)    You were seen today for a laceration (cut).  Your provider examined your laceration for any problems such a buried foreign body (like glass, a splinter, or gravel), or injury to blood vessels, tendons, and nerves.  Your provider may have also rinsed and/or scrubbed your laceration to help prevent an infection. It may not be possible to find all problems with your laceration on the first visit; occasionally foreign bodies or a tendon injury can go undetected.    Your laceration may have been closed in one of several ways:  No closure: many wounds will heal just fine without closure.  Stitches: regular stitches that require removal.  Staples: skin staples are often used in the scalp/head.  Wound adhesive (glue): skin glue can be used for certain lacerations and doesn t require removal.  Wound strips (aka Butterfly bandages or steri-strips): these are bandages that help to close a wound.  Absorbable stitches:  dissolving  stitches that go away on their own and usually don t require removal.    A small percentage of wounds will develop an infection regardless of how well the wound is cared for. Antibiotics are generally not indicated to prevent an infection so are only given for a small number of high-risk wounds. Some lacerations are too high risk to close, and are left open to heal because closure can increase the likelihood that an infection will develop.    Remember that all lacerations, no matter how expertly repaired, will cause scarring. We consider many factors, techniques, and materials, in our efforts to  provide the best possible cosmetic outcome.    Generally, every Emergency Department visit should have a follow-up clinic visit with either a primary or a specialty clinic/provider. Please follow-up as instructed by your emergency provider today.     Return to the Emergency Department right away if:  You have more redness, swelling, pain, drainage (pus), a bad smell, or red streaking from your laceration as these symptoms could indicate an infection.  You have a fever of 100.4 F or more.  You have bleeding that you cannot stop at home. If your cut starts to bleed, hold pressure on the bleeding area with a clean cloth or put pressure over the bandage.  If the bleeding does not stop after using constant pressure for 30 minutes, you should return to the Emergency Department for further treatment.  An area past the laceration is cool, pale, or blue compared with the other side, or has a slower return of color when squeezed.  Your dressing seems too tight or starts to get uncomfortable or painful. For children, signs of a problem might be irritability or restlessness.  You have loss of normal function or use of an area, such as being unable to straighten or bend a finger normally.  You have a numb area past the laceration.    Return to the Emergency Department or see your regular provider if:  The laceration starts to come open.   You have something coming out of the cut or a feeling that there is something in the laceration.  Your wound will not heal, or keeps breaking open. There can always be glass, wood, dirt or other things in any wound.  They will not always show up, even on x-rays.  If a wound does not heal, this may be why, and it is important to follow-up with your regular provider.    Home Care:  Take your dressing off in 12-24 hours, or as instructed by your provider, to check your laceration. Remove the dressing sooner if it seems too tight or painful, or if it is getting numb, tingly, or pale past the  dressing.  Gently wash your laceration 1-2 times daily with clean water and mild soap. It is okay to shower or run clean water over the laceration, but do not let the laceration soak in water (no swimming).  If your laceration was closed with wound adhesive or strips: pat it dry and leave it open to the air. For all other repairs: after you wash your laceration, or at least 2 times a day, apply antibiotic ointment (such as Neosporin  or Bacitracin ) to the laceration, then cover it with a Band-Aid  or gauze.  Keep the laceration clean. Wear gloves or other protective clothing if you are around dirt.    Follow-up for removal:  If your wound was closed with staples or regular stitches, they need to be removed according to the instructions and timeline specified by your provider today.  If your wound was closed with absorbable ( dissolving ) sutures, they should fall out, dissolve, or not be visible in about one week. If they are still visible, then they should be removed according to the instructions and timeline specified by your provider today.    Scars:  To help minimize scarring:  Wear sunscreen over the healed laceration when out in the sun.  Massage the area regularly once healed.  You may apply Vitamin E to the healed wound.  Wait. Scars improve in appearance over months and years.    If you were given a prescription for medicine here today, be sure to read all of the information (including the package insert) that comes with your prescription.  This will include important information about the medicine, its side effects, and any warnings that you need to know about.  The pharmacist who fills the prescription can provide more information and answer questions you may have about the medicine.  If you have questions or concerns that the pharmacist cannot address, please call or return to the Emergency Department.       Remember that you can always come back to the Emergency Department if you are not able to see  your regular provider in the amount of time listed above, if you get any new symptoms, or if there is anything that worries you.    Discharge Instructions  Head Injury    You have been seen today for a head injury. Your evaluation included a history and physical examination. You may have had a CT (CAT) scan performed, though most head injuries do not require a scan. Based on this evaluation, your provider today does not feel that your head injury is serious.    Generally, every Emergency Department visit should have a follow-up clinic visit with either a primary or a specialty clinic/provider. Please follow-up as instructed by your emergency provider today.  Return to the Emergency Department if:  You are confused or you are not acting right.  Your headache gets worse or you start to have a really bad headache even with your recommended treatment plan.  You vomit (throw up) more than once.  You have a seizure.  You have trouble walking.  You have weakness or paralysis (cannot move) in an arm or a leg.  You have blood or fluid coming from your ears or nose.  You have new symptoms or anything that worries you.    Sleeping:  It is okay for you to sleep, but someone should wake you up if instructed by your provider, and someone should check on you at your usual time to wake up.     Activity:  Do not drive for at least 24 hours.  Do not drive if you have dizzy spells or trouble concentrating, or remembering things.  Do not return to any contact sports until cleared by your regular provider.     MORE INFORMATION:    Concussion:  A concussion is a minor head injury that may cause temporary problems with the way the brain works. Although concussions are important, they are generally not an emergency or a reason that a person needs to be hospitalized. Some concussion symptoms include confusion, amnesia (forgetful), nausea (sick to your stomach) and vomiting (throwing up), dizziness, fatigue, memory or concentration problems,  irritability and sleep problems. For most people, concussions are mild and temporary but some will have more severe and persistent symptoms that require on-going care and treatment.  CT Scans: Your evaluation today may have included a CT scan (CAT scan) to look for things like bleeding or a skull fracture (broken bone).  CT scans involve radiation and too many CT scans can cause serious health problems like cancer, especially in children.  Because of this, your provider may not have ordered a CT scan today if they think you are at low risk for a serious or life threatening problem.    If you were given a prescription for medicine here today, be sure to read all of the information (including the package insert) that comes with your prescription.  This will include important information about the medicine, its side effects, and any warnings that you need to know about.  The pharmacist who fills the prescription can provide more information and answer questions you may have about the medicine.  If you have questions or concerns that the pharmacist cannot address, please call or return to the Emergency Department.     Remember that you can always come back to the Emergency Department if you are not able to see your regular provider in the amount of time listed above, if you get any new symptoms, or if there is anything that worries you.

## 2021-09-27 NOTE — TELEPHONE ENCOUNTER
What type of discharge? Emergency Department  Risk of Hospital admission or ED visit: 64%  Is a TCM episode required? No  When should the patient follow up with PCP? within 30 days of discharge.    Jane Pappas RN  [facility]
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
## 2021-10-01 ENCOUNTER — TELEPHONE (OUTPATIENT)
Dept: OPHTHALMOLOGY | Facility: CLINIC | Age: 56
End: 2021-10-01

## 2021-10-01 NOTE — TELEPHONE ENCOUNTER
[facility] Call Center    Phone Message    May a detailed message be left on voicemail: yes     Reason for Call: Other: Pt's emergency contact calling to follow up on ed instructions for the Pt to be seen in plastics asap. She states they have not had any follow up. Chart notes recommended they be seen either 9/27 or 9/28. Please call pt directly to discuss.      Action Taken: Message routed to:  [facility] Eye    Travel Screening: Not Applicable

## 2021-10-04 ENCOUNTER — TELEPHONE (OUTPATIENT)
Dept: OPHTHALMOLOGY | Facility: CLINIC | Age: 56
End: 2021-10-04

## 2021-10-04 NOTE — TELEPHONE ENCOUNTER
"Spoke with patient regarding scheduling with Dr. Perez for tomorrow 10/5/21 for: \"several facial lacerations see note from Dr. Fernando\" . Scheduled patient as offered and sent appointment letter and map to confirmed email address.-Per Patient   "

## 2021-10-04 NOTE — TELEPHONE ENCOUNTER
"Can you please call to schedule with patient Dr. Perez for tomorrow 10/5. \"several facial lacerations see note from Dr. Beth\"  Thanks!  Saritha Senior RN RN 4:12 PM 10/04/21    "

## 2021-10-05 ENCOUNTER — OFFICE VISIT (OUTPATIENT)
Dept: OPHTHALMOLOGY | Facility: CLINIC | Age: 56
End: 2021-10-05
Payer: COMMERCIAL

## 2021-10-05 ENCOUNTER — PRE VISIT (OUTPATIENT)
Dept: OPHTHALMOLOGY | Facility: CLINIC | Age: 56
End: 2021-10-05

## 2021-10-05 DIAGNOSIS — F20.0 PARANOID SCHIZOPHRENIA (H): ICD-10-CM

## 2021-10-05 DIAGNOSIS — S01.81XD: Primary | ICD-10-CM

## 2021-10-05 PROCEDURE — 92285 EXTERNAL OCULAR PHOTOGRAPHY: CPT | Mod: GC | Performed by: OPHTHALMOLOGY

## 2021-10-05 PROCEDURE — 99203 OFFICE O/P NEW LOW 30 MIN: CPT | Mod: 25 | Performed by: OPHTHALMOLOGY

## 2021-10-05 PROCEDURE — S0630 REMOVAL OF SUTURES: HCPCS | Mod: GC | Performed by: OPHTHALMOLOGY

## 2021-10-05 ASSESSMENT — TONOMETRY
OD_IOP_MMHG: 16
IOP_METHOD: ICARE
OS_IOP_MMHG: 15

## 2021-10-05 ASSESSMENT — VISUAL ACUITY
CORRECTION_TYPE: GLASSES
OS_CC+: -1
METHOD: SNELLEN - LINEAR
OD_CC: 20/20
OS_CC: 20/25

## 2021-10-05 ASSESSMENT — CONF VISUAL FIELD
OD_NORMAL: 1
OS_NORMAL: 1
METHOD: COUNTING FINGERS

## 2021-10-05 ASSESSMENT — SLIT LAMP EXAM - LIDS
COMMENTS: NORMAL
COMMENTS: NORMAL

## 2021-10-05 ASSESSMENT — EXTERNAL EXAM - RIGHT EYE: OD_EXAM: NORMAL

## 2021-10-05 ASSESSMENT — MARGIN REFLEX DISTANCE
OS_MRD1: 0.5
OD_MRD1: 3
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
## 2021-10-05 NOTE — PATIENT INSTRUCTIONS
Use over the counter antibiotic ointment on your wounds 3 x daily. Bacitracin is a good one.     After 2 weeks (10/19/2021) you can switch to using Aquaphor or Vaseline on the incision 2-3 x daily.    Gently massage the lower eyelid upwards.
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
## 2021-10-05 NOTE — TELEPHONE ENCOUNTER
FUTURE VISIT INFORMATION      FUTURE VISIT INFORMATION:    Date: 10/5/21    Time: 8:15am    Location: [facility]  REFERRAL INFORMATION:  Referring provider:  Kirit Bills    Referring providers clinic:  [facility]    Reason for visit/diagnosis  several facial lacerations    RECORDS REQUESTED FROM:       Clinic name Comments Records Status Imaging Status   [place] [facility] ED visit 9/26/21 EPIC

## 2021-10-26 ENCOUNTER — OFFICE VISIT (OUTPATIENT)
Dept: FAMILY MEDICINE | Facility: CLINIC | Age: 56
End: 2021-10-26
Payer: COMMERCIAL

## 2021-10-26 VITALS
SYSTOLIC BLOOD PRESSURE: 126 MMHG | WEIGHT: 190 LBS | HEIGHT: 73 IN | TEMPERATURE: 98.2 F | OXYGEN SATURATION: 98 % | DIASTOLIC BLOOD PRESSURE: 78 MMHG | BODY MASS INDEX: 25.18 KG/M2 | HEART RATE: 86 BPM | RESPIRATION RATE: 18 BRPM

## 2021-10-26 DIAGNOSIS — R20.0 BILATERAL HAND NUMBNESS: Primary | ICD-10-CM

## 2021-10-26 LAB
ERYTHROCYTE [DISTWIDTH] IN BLOOD BY AUTOMATED COUNT: 15.4 % (ref 10–15)
HBA1C MFR BLD: 5.9 % (ref 0–5.6)
HCT VFR BLD AUTO: 44.2 % (ref 40–53)
HGB BLD-MCNC: 14.3 G/DL (ref 13.3–17.7)
MCH RBC QN AUTO: 29.5 PG (ref 26.5–33)
MCHC RBC AUTO-ENTMCNC: 32.4 G/DL (ref 31.5–36.5)
MCV RBC AUTO: 91 FL (ref 78–100)
PLATELET # BLD AUTO: 164 10E3/UL (ref 150–450)
RBC # BLD AUTO: 4.84 10E6/UL (ref 4.4–5.9)
VIT B12 SERPL-MCNC: 622 PG/ML (ref 193–986)
WBC # BLD AUTO: 8.9 10E3/UL (ref 4–11)

## 2021-10-26 PROCEDURE — 99213 OFFICE O/P EST LOW 20 MIN: CPT | Performed by: FAMILY MEDICINE

## 2021-10-26 PROCEDURE — 83036 HEMOGLOBIN GLYCOSYLATED A1C: CPT | Performed by: FAMILY MEDICINE

## 2021-10-26 PROCEDURE — 82607 VITAMIN B-12: CPT | Performed by: FAMILY MEDICINE

## 2021-10-26 PROCEDURE — 82306 VITAMIN D 25 HYDROXY: CPT | Performed by: FAMILY MEDICINE

## 2021-10-26 PROCEDURE — 85027 COMPLETE CBC AUTOMATED: CPT | Performed by: FAMILY MEDICINE

## 2021-10-26 PROCEDURE — 36415 COLL VENOUS BLD VENIPUNCTURE: CPT | Performed by: FAMILY MEDICINE

## 2021-10-26 ASSESSMENT — MIFFLIN-ST. JEOR: SCORE: 1750.71

## 2021-10-26 ASSESSMENT — PAIN SCALES - GENERAL: PAINLEVEL: MILD PAIN (2)

## 2021-10-26 NOTE — PROGRESS NOTES
"  Assessment & Plan     Bilateral hand numbness  Patient does not have any neurological symptoms however he subjectively feels some numbness in the hands.  We discussed about different etiologies including some deficiency of vitamin we will get some blood work and follow-up on that.  If that is normal I suggested to do some physical therapy with neck range of motion to see if that helps.  If this continue we can start with further evaluation including some neck imaging versus neurology evaluation.  - CBC with platelets; Future  - Vitamin B12; Future  - Vitamin D Deficiency; Future  - Hemoglobin A1c; Future  - CBC with platelets  - Vitamin B12  - Vitamin D Deficiency  - Hemoglobin A1c      Tobacco Cessation:   reports that he has been smoking. He has never used smokeless tobacco.      BMI:   Estimated body mass index is 25.07 kg/m  as calculated from the following:    Height as of this encounter: 1.854 m (6' 1\").    Weight as of this encounter: 86.2 kg (190 lb).           No follow-ups on file.    Maynor Negron MD  [facility] HomeBENITA Marina is a 56 year old who presents for the following health issues    HPI     Concern - numbness/tingling in fingers   Onset: x ongoing for 8 months or so   Description: both hands/finger   Intensity: mild  Progression of Symptoms:  same and constant  Accompanying Signs & Symptoms:   Previous history of similar problem:   Precipitating factors:        Worsened by:   Alleviating factors:        Improved by:   Therapies tried and outcome: None    Denies any chest pains no shortness of breath.  He does not feel any neck pain.  Describes tip of the fingers are sometimes numb there is no atrophy.    Review of Systems   Constitutional, HEENT, cardiovascular, pulmonary, gi and gu systems are negative, except as otherwise noted.      Objective    /78   Pulse 86   Temp 98.2  F (36.8  C) (Tympanic)   Resp 18   Ht 1.854 m (6' 1\")   Wt 86.2 kg (190 lb)  "  SpO2 98%   BMI 25.07 kg/m    Body mass index is 25.07 kg/m .  Physical Exam   GENERAL: healthy, alert and no distress  RESP: lungs clear to auscultation - no rales, rhonchi or wheezes  Normal range of motion the neck.  CV: regular rate and rhythm, normal S1 S2, no S3 or S4, no murmur, click or rub, no peripheral edema and peripheral pulses strong  ABDOMEN: soft, nontender, no hepatosplenomegaly, no masses and bowel sounds normal

## 2021-10-26 NOTE — LETTER
October 27, 2021      Salas Macias  [address] [address]        Dear ,    I have reviewed your recent labs. Here are the results:     -Normal red blood cell (hgb) levels, normal white blood cell count and normal platelet levels.   -A1C (test of diabetes control the last 2-3 months) is at your goal. Please continue with your current plan. Also, you should make an appointment to see me and recheck your A1C test in 6 months.     -Vitamin D level is low and oral supplementation should be started.  ADVISE: starting over the counter Vitamin D3  2000 IU - 3 tabs (6000 IU) daily for 6 weeks and then 2000 IU daily to maintain levels.  Then in 2 months, please schedule a lab only appointment to recheck your Vitamin D levels.     - Vit b12 is normal.       Resulted Orders   CBC with platelets   Result Value Ref Range    WBC Count 8.9 4.0 - 11.0 10e3/uL    RBC Count 4.84 4.40 - 5.90 10e6/uL    Hemoglobin 14.3 13.3 - 17.7 g/dL    Hematocrit 44.2 40.0 - 53.0 %    MCV 91 78 - 100 fL    MCH 29.5 26.5 - 33.0 pg    MCHC 32.4 31.5 - 36.5 g/dL    RDW 15.4 (H) 10.0 - 15.0 %    Platelet Count 164 150 - 450 10e3/uL   Vitamin B12   Result Value Ref Range    Vitamin B12 622 193 - 986 pg/mL   Vitamin D Deficiency   Result Value Ref Range    Vitamin D, Total (25-Hydroxy) 19 (L) 20 - 75 ug/L    Narrative    Season, race, dietary intake, and treatment affect the concentration of 25-hydroxy-Vitamin D. Values may decrease during winter months and increase during summer months. Values 20-29 ug/L may indicate Vitamin D insufficiency and values <20 ug/L may indicate Vitamin D deficiency.    Vitamin D determination is routinely performed by an immunoassay specific for 25 hydroxyvitamin D3.  If an individual is on vitamin D2(ergocalciferol) supplementation, please specify 25 OH vitamin D2 and D3 level determination by LCMSMS test [id number].    Season, race, dietary intake, and treatment affect the concentration of  25-hydroxy-Vitamin D. Values may decrease during winter months and increase during summer months. Values 20-29 ug/L may indicate Vitamin D insufficiency and values <20 ug/L may indicate Vitamin D deficiency.    Vitamin D determination is routinely performed by an immunoassay specific for 25 hydroxyvitamin D3.  If an individual is on vitamin D2(ergocalciferol) supplementation, please specify 25 OH vitamin D2 and D3 level determination by LCMSMS test [id number].     Hemoglobin A1c   Result Value Ref Range    Hemoglobin A1C 5.9 (H) 0.0 - 5.6 %      Comment:      Normal <5.7%   Prediabetes 5.7-6.4%    Diabetes 6.5% or higher     Note: Adopted from ADA consensus guidelines.       If you have any questions or concerns, please call the clinic at the number listed above.       Sincerely,      Maynor Negron MD

## 2021-10-27 LAB — DEPRECATED CALCIDIOL+CALCIFEROL SERPL-MC: 19 UG/L (ref 20–75)

## 2022-01-04 ENCOUNTER — OFFICE VISIT (OUTPATIENT)
Dept: OPHTHALMOLOGY | Facility: CLINIC | Age: 57
End: 2022-01-04
Payer: COMMERCIAL

## 2022-01-04 DIAGNOSIS — H53.10 SUBJECTIVE VISUAL DISTURBANCE: ICD-10-CM

## 2022-01-04 DIAGNOSIS — H53.462 HOMONYMOUS HEMIANOPIA, LEFT: Primary | ICD-10-CM

## 2022-01-04 DIAGNOSIS — I63.9 OCCIPITAL INFARCTION (H): ICD-10-CM

## 2022-01-04 PROCEDURE — 92083 EXTENDED VISUAL FIELD XM: CPT | Performed by: OPHTHALMOLOGY

## 2022-01-04 PROCEDURE — 92133 CPTRZD OPH DX IMG PST SGM ON: CPT | Performed by: OPHTHALMOLOGY

## 2022-01-04 PROCEDURE — 99215 OFFICE O/P EST HI 40 MIN: CPT | Performed by: OPHTHALMOLOGY

## 2022-01-04 ASSESSMENT — VISUAL ACUITY
OS_CC: 20/20
OD_CC: 20/20
CORRECTION_TYPE: GLASSES
OS_CC+: -2
METHOD: SNELLEN - LINEAR

## 2022-01-04 ASSESSMENT — GONIOSCOPY
METHOD: ZEISS, FOUR MIRROR
OD_SUPERIOR: SS
OS_INFERIOR: SS
OD_INFERIOR: SS
OS_SUPERIOR: SS

## 2022-01-04 ASSESSMENT — CUP TO DISC RATIO
OD_RATIO: 0.7
OS_RATIO: 0.7

## 2022-01-04 ASSESSMENT — TONOMETRY
OS_IOP_MMHG: 19
OD_IOP_MMHG: 21
IOP_METHOD: APPLANATION

## 2022-01-04 ASSESSMENT — CONF VISUAL FIELD
OS_NORMAL: 1
OD_NORMAL: 1
METHOD: COUNTING FINGERS

## 2022-01-04 ASSESSMENT — EXTERNAL EXAM - RIGHT EYE: OD_EXAM: NORMAL

## 2022-01-04 ASSESSMENT — SLIT LAMP EXAM - LIDS
COMMENTS: NORMAL
COMMENTS: NORMAL
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
## 2022-01-05 ENCOUNTER — ANCILLARY PROCEDURE (OUTPATIENT)
Dept: MRI IMAGING | Facility: CLINIC | Age: 57
End: 2022-01-05
Attending: OPHTHALMOLOGY
Payer: COMMERCIAL

## 2022-01-05 DIAGNOSIS — H53.462 HOMONYMOUS HEMIANOPIA, LEFT: ICD-10-CM

## 2022-01-05 LAB — RADIOLOGIST FLAGS: ABNORMAL

## 2022-01-05 PROCEDURE — 70543 MRI ORBT/FAC/NCK W/O &W/DYE: CPT | Mod: GC | Performed by: STUDENT IN AN ORGANIZED HEALTH CARE EDUCATION/TRAINING PROGRAM

## 2022-01-05 PROCEDURE — A9585 GADOBUTROL INJECTION: HCPCS | Performed by: STUDENT IN AN ORGANIZED HEALTH CARE EDUCATION/TRAINING PROGRAM

## 2022-01-05 PROCEDURE — 70553 MRI BRAIN STEM W/O & W/DYE: CPT | Mod: GC | Performed by: STUDENT IN AN ORGANIZED HEALTH CARE EDUCATION/TRAINING PROGRAM

## 2022-01-05 RX ORDER — GADOBUTROL 604.72 MG/ML
10 INJECTION INTRAVENOUS ONCE
Status: COMPLETED | OUTPATIENT
Start: 2022-01-05 | End: 2022-01-05

## 2022-01-05 RX ADMIN — GADOBUTROL 8.5 ML: 604.72 INJECTION INTRAVENOUS at 10:33

## 2022-01-05 NOTE — DISCHARGE INSTRUCTIONS
MRI Contrast Discharge Instructions    The IV contrast you received today will pass out of your body in your  urine. This will happen in the next 24 hours. You will not feel this process.  Your urine will not change color.    Drink at least 4 extra glasses of water or juice today (unless your doctor  has restricted your fluids). This reduces the stress on your kidneys.  You may take your regular medicines.    If you are on dialysis: It is best to have dialysis today.    If you have a reaction: Most reactions happen right away. If you have  any new symptoms after leaving the hospital (such as hives or swelling),  call your hospital at the correct number below. Or call your family doctor.  If you have breathing distress or wheezing, call 911.    Special instructions: ***    I have read and understand the above information.    Signature:______________________________________ Date:___________    Staff:__________________________________________ Date:___________     Time:__________    [facility] Departments:    ___[place]: [phone]  ___[facility]: [phone]  ___[place]: [phone] ___[facility]: [phone]  ___[facility]: [phone]  ___[facility]: [phone]  ___[phone]  ___[facility]: [phone]  ___[phone]  
right normal/left normal

## 2022-01-19 ENCOUNTER — VIRTUAL VISIT (OUTPATIENT)
Dept: NEUROLOGY | Facility: CLINIC | Age: 57
End: 2022-01-19
Payer: COMMERCIAL

## 2022-01-19 DIAGNOSIS — H53.462 HOMONYMOUS HEMIANOPIA, LEFT: ICD-10-CM

## 2022-01-19 DIAGNOSIS — I63.9 OCCIPITAL INFARCTION (H): ICD-10-CM

## 2022-01-19 PROCEDURE — 99203 OFFICE O/P NEW LOW 30 MIN: CPT | Mod: 95 | Performed by: STUDENT IN AN ORGANIZED HEALTH CARE EDUCATION/TRAINING PROGRAM

## 2022-01-19 RX ORDER — ATORVASTATIN CALCIUM 20 MG/1
20 TABLET, FILM COATED ORAL DAILY
Qty: 30 TABLET | Refills: 4 | Status: SHIPPED | OUTPATIENT
Start: 2022-01-19

## 2022-01-19 RX ORDER — ASPIRIN 81 MG/1
81 TABLET, CHEWABLE ORAL DAILY
Qty: 30 TABLET | Refills: 4 | Status: SHIPPED | OUTPATIENT
Start: 2022-01-19
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
## 2022-01-19 NOTE — PROGRESS NOTES
Salas is a 56 year old who is being evaluated via a billable video visit.      How would you like to obtain your AVS? Mail a copy  If the video visit is dropped, the invitation should be resent by: Text to cell phone: [phone]  Will anyone else be joining your video visit? No      Video Start Time: 9:57  Video-Visit Details    Type of service:  Video Visit--converted to tele visit    Video End Time: 10:16    Originating Location (pt. Location): Home    Distant Location (provider location):  [facility] NEUROLOG[facility] [place]     Platform used for Video Visit: [facility]
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
## 2022-01-19 NOTE — PATIENT INSTRUCTIONS
Stroke plan:  --MRI Brain--reviewed as above  --Mechanism of stroke: requires further work up  --CTA H/N ordered  --Echo (TTE) with bubble  --FLP, TSH, HIV, RPR to be ordered, A1C reviewed (pre diabetes range)  --Statin--will start at 20 mg atorvastatin pending lipid panel  --Aspirin 81 mg daily  --Smoking cessation counseling given--please work to stop smoking Salas  --Would continue to work with ophthalmology as directed, vision may need to improve before he is able to safely drive again, which we discussed  --PCP follow up for risk factors--Do obtain a Primary care physician  --Follow up with me in ~ 3 months to check in, pending his work up may not need long term follow up with neurology specific

## 2022-02-08 NOTE — PROGRESS NOTES
HPI:    He comes into establish care today. He has stopped smoking for a few days and has been using nicorette gum but you like to have [facility] pharmacy for smoking cessation. He does not exercise that much. He states he has h/o Schizophrenia and well controlled on this current medications. He has family in the [place], grand children and children, and two sisters. He declines COVID vaccinations. He states about 8 months of B finger numbness, sometimes worse at night. He has taken to wearing gloves to reduce his sxs. No weakness. Today stable and no other HEENT, cardiopulmonary, abdomen, systemic, psychiatric, lymphatic, endocrine, vascular complaints.     Past Medical History:   Diagnosis Date     Depression      Paranoid schizophrenia (H)      No past surgical history on file.    PE:    Vitals noted, gen, nad, cooperative, alert, neck supple nl rom, no B carotid bruits, lungs with fair air movement, RRR, S1, S2, no MRG, abdomen, no acute findings. Grossly normal neurological exam. No tenderness to B wrists. Adequate B  strength.     A/P:    1. Immunizations; Tdap 9/26/2021, COVID vaccine(s); he declines these recommended vaccines.  Check with insurance regarding Shingrix  2. Increased lipids on Atorvastatin; lipids 11/18/2020 HDL 45 and LDL 81. TG's 112  3. Psychiatry on Fluoxetine, and Zyprexa; he states this is stable and follows for up to 7 years with his psychiatrist (in [place]?).  4. On ASA  5. Vitamin D; low Vit D on 10/26/2021 of 19 and reordered today 2/9/2022  6. Colonoscopy; negative FIT testing 11/18/2020  7. PSA; 1.14 11/18/2020, reordered today   8. 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 MRI brain 1/5/2022 with Late subacute R parieto-occipital infarct. See neurology Dr. Young note from 1/19/2022. H/o L homonymous hemianopsia. Seen in Ophthalmology, Dr. Perez 1/4/2022  11. Recommend he get the resting echo ordered 1/19/2022  12. [place] referral for smoking cessation and sent  in Rx. For nicorette gum  13. Ordered B EMG for B hand numbness     I will see him back in about one month     40 minutes spent on the date of the encounter doing chart review, history and exam, documentation and further activities as noted above

## 2022-02-09 ENCOUNTER — LAB (OUTPATIENT)
Dept: LAB | Facility: CLINIC | Age: 57
End: 2022-02-09
Payer: COMMERCIAL

## 2022-02-09 ENCOUNTER — OFFICE VISIT (OUTPATIENT)
Dept: INTERNAL MEDICINE | Facility: CLINIC | Age: 57
End: 2022-02-09
Payer: COMMERCIAL

## 2022-02-09 VITALS
HEART RATE: 88 BPM | DIASTOLIC BLOOD PRESSURE: 89 MMHG | OXYGEN SATURATION: 100 % | WEIGHT: 186 LBS | BODY MASS INDEX: 24.65 KG/M2 | HEIGHT: 73 IN | SYSTOLIC BLOOD PRESSURE: 132 MMHG

## 2022-02-09 DIAGNOSIS — Z71.6 ENCOUNTER FOR SMOKING CESSATION COUNSELING: ICD-10-CM

## 2022-02-09 DIAGNOSIS — Z12.5 SCREENING PSA (PROSTATE SPECIFIC ANTIGEN): ICD-10-CM

## 2022-02-09 DIAGNOSIS — Z12.5 SCREENING PSA (PROSTATE SPECIFIC ANTIGEN): Primary | ICD-10-CM

## 2022-02-09 DIAGNOSIS — R20.0 NUMBNESS: ICD-10-CM

## 2022-02-09 LAB
ALBUMIN SERPL-MCNC: 4.1 G/DL (ref 3.4–5)
ALP SERPL-CCNC: 82 U/L (ref 40–150)
ALT SERPL W P-5'-P-CCNC: 25 U/L (ref 0–70)
ANION GAP SERPL CALCULATED.3IONS-SCNC: 1 MMOL/L (ref 3–14)
AST SERPL W P-5'-P-CCNC: 18 U/L (ref 0–45)
BASOPHILS # BLD AUTO: 0 10E3/UL (ref 0–0.2)
BASOPHILS NFR BLD AUTO: 0 %
BILIRUB SERPL-MCNC: 0.4 MG/DL (ref 0.2–1.3)
BUN SERPL-MCNC: 17 MG/DL (ref 7–30)
CALCIUM SERPL-MCNC: 9.2 MG/DL (ref 8.5–10.1)
CHLORIDE BLD-SCNC: 106 MMOL/L (ref 94–109)
CHOLEST SERPL-MCNC: 172 MG/DL
CO2 SERPL-SCNC: 29 MMOL/L (ref 20–32)
CREAT SERPL-MCNC: 0.9 MG/DL (ref 0.66–1.25)
DEPRECATED CALCIDIOL+CALCIFEROL SERPL-MC: 24 UG/L (ref 20–75)
EOSINOPHIL # BLD AUTO: 0.1 10E3/UL (ref 0–0.7)
EOSINOPHIL NFR BLD AUTO: 1 %
ERYTHROCYTE [DISTWIDTH] IN BLOOD BY AUTOMATED COUNT: 14.1 % (ref 10–15)
FASTING STATUS PATIENT QL REPORTED: NO
GFR SERPL CREATININE-BSD FRML MDRD: >90 ML/MIN/1.73M2
GLUCOSE BLD-MCNC: 104 MG/DL (ref 70–99)
HCT VFR BLD AUTO: 43.6 % (ref 40–53)
HDLC SERPL-MCNC: 52 MG/DL
HGB BLD-MCNC: 14.1 G/DL (ref 13.3–17.7)
IMM GRANULOCYTES # BLD: 0 10E3/UL
IMM GRANULOCYTES NFR BLD: 0 %
LDLC SERPL CALC-MCNC: 110 MG/DL
LYMPHOCYTES # BLD AUTO: 1.9 10E3/UL (ref 0.8–5.3)
LYMPHOCYTES NFR BLD AUTO: 23 %
MCH RBC QN AUTO: 29 PG (ref 26.5–33)
MCHC RBC AUTO-ENTMCNC: 32.3 G/DL (ref 31.5–36.5)
MCV RBC AUTO: 90 FL (ref 78–100)
MONOCYTES # BLD AUTO: 0.8 10E3/UL (ref 0–1.3)
MONOCYTES NFR BLD AUTO: 10 %
NEUTROPHILS # BLD AUTO: 5.3 10E3/UL (ref 1.6–8.3)
NEUTROPHILS NFR BLD AUTO: 66 %
NONHDLC SERPL-MCNC: 120 MG/DL
NRBC # BLD AUTO: 0 10E3/UL
NRBC BLD AUTO-RTO: 0 /100
PLATELET # BLD AUTO: 141 10E3/UL (ref 150–450)
POTASSIUM BLD-SCNC: 4.4 MMOL/L (ref 3.4–5.3)
PROT SERPL-MCNC: 7.5 G/DL (ref 6.8–8.8)
PSA SERPL-MCNC: 1.18 UG/L (ref 0–4)
RBC # BLD AUTO: 4.86 10E6/UL (ref 4.4–5.9)
SODIUM SERPL-SCNC: 136 MMOL/L (ref 133–144)
TRIGL SERPL-MCNC: 48 MG/DL
WBC # BLD AUTO: 8.1 10E3/UL (ref 4–11)

## 2022-02-09 PROCEDURE — G0103 PSA SCREENING: HCPCS | Performed by: PATHOLOGY

## 2022-02-09 PROCEDURE — 80061 LIPID PANEL: CPT | Performed by: PATHOLOGY

## 2022-02-09 PROCEDURE — 99204 OFFICE O/P NEW MOD 45 MIN: CPT | Performed by: INTERNAL MEDICINE

## 2022-02-09 PROCEDURE — 80053 COMPREHEN METABOLIC PANEL: CPT | Performed by: PATHOLOGY

## 2022-02-09 PROCEDURE — 82306 VITAMIN D 25 HYDROXY: CPT | Mod: 90 | Performed by: PATHOLOGY

## 2022-02-09 PROCEDURE — 99000 SPECIMEN HANDLING OFFICE-LAB: CPT | Performed by: PATHOLOGY

## 2022-02-09 PROCEDURE — 36415 COLL VENOUS BLD VENIPUNCTURE: CPT | Performed by: PATHOLOGY

## 2022-02-09 PROCEDURE — 85025 COMPLETE CBC W/AUTO DIFF WBC: CPT | Performed by: PATHOLOGY

## 2022-02-09 RX ORDER — FLUOXETINE 40 MG/1
CAPSULE ORAL
COMMUNITY
Start: 2022-02-03 | End: 2022-02-11

## 2022-02-09 RX ORDER — ALPRAZOLAM 1 MG
1 TABLET ORAL 3 TIMES DAILY
COMMUNITY
Start: 2022-02-08

## 2022-02-09 ASSESSMENT — PAIN SCALES - GENERAL: PAINLEVEL: NO PAIN (0)

## 2022-02-09 ASSESSMENT — MIFFLIN-ST. JEOR: SCORE: 1732.57

## 2022-02-09 NOTE — NURSING NOTE
Chief Complaint   Patient presents with     Establish Care     meet and dicscuss med hx       Beckie Marie, EMT at 7:37 AM on 2/9/2022

## 2022-02-10 ENCOUNTER — TELEPHONE (OUTPATIENT)
Dept: INTERNAL MEDICINE | Facility: CLINIC | Age: 57
End: 2022-02-10

## 2022-02-10 DIAGNOSIS — D69.6 PLATELETS DECREASED (H): Primary | ICD-10-CM

## 2022-02-10 NOTE — TELEPHONE ENCOUNTER
Placed future lab order this encounter    Dear Mr. Macias;    Your laboratory tests are mostly stable. Your platelets are a little low and I recommend we just recheck at your next visit with me on 3/18/2022. I placed a future laboratory order for this today.      BHANU Bagley MD

## 2022-02-11 ENCOUNTER — OFFICE VISIT (OUTPATIENT)
Dept: PHARMACY | Facility: CLINIC | Age: 57
End: 2022-02-11
Payer: COMMERCIAL

## 2022-02-11 VITALS — SYSTOLIC BLOOD PRESSURE: 139 MMHG | DIASTOLIC BLOOD PRESSURE: 82 MMHG | HEART RATE: 97 BPM

## 2022-02-11 DIAGNOSIS — F20.0 PARANOID SCHIZOPHRENIA (H): Primary | ICD-10-CM

## 2022-02-11 DIAGNOSIS — E78.00 HIGH CHOLESTEROL: ICD-10-CM

## 2022-02-11 DIAGNOSIS — Z78.9 TAKES DIETARY SUPPLEMENTS: ICD-10-CM

## 2022-02-11 DIAGNOSIS — N52.9 ERECTILE DYSFUNCTION, UNSPECIFIED ERECTILE DYSFUNCTION TYPE: ICD-10-CM

## 2022-02-11 DIAGNOSIS — Z13.6 CARDIOVASCULAR SCREENING; LDL GOAL LESS THAN 160: ICD-10-CM

## 2022-02-11 DIAGNOSIS — Z72.0 TOBACCO ABUSE: ICD-10-CM

## 2022-02-11 PROCEDURE — 99605 MTMS BY PHARM NP 15 MIN: CPT | Performed by: PHARMACIST

## 2022-02-11 PROCEDURE — 99607 MTMS BY PHARM ADDL 15 MIN: CPT | Performed by: PHARMACIST

## 2022-02-11 RX ORDER — NICOTINE 21 MG/24HR
1 PATCH, TRANSDERMAL 24 HOURS TRANSDERMAL EVERY 24 HOURS
Qty: 42 PATCH | Refills: 0 | Status: SHIPPED | OUTPATIENT
Start: 2022-02-11 | End: 2022-03-11

## 2022-02-11 RX ORDER — NICOTINE 21 MG/24HR
1 PATCH, TRANSDERMAL 24 HOURS TRANSDERMAL EVERY 24 HOURS
Qty: 14 PATCH | Refills: 0 | Status: SHIPPED | OUTPATIENT
Start: 2022-02-11 | End: 2022-09-15

## 2022-02-11 RX ORDER — OLANZAPINE 20 MG/1
20 TABLET ORAL AT BEDTIME
COMMUNITY
Start: 2022-02-03

## 2022-02-11 NOTE — PROGRESS NOTES
Medication Therapy Management (MTM) Encounter    ASSESSMENT:                            Medication Adherence/Access: No issues identified    Schizophrenia: Currently taking fluoxetine 40 mg, alprazolam 1 mg three times a day, and olanzapine 20 mg daily. Reports good control of symptoms and no side effects.    Smoking cessation: Since the patient usually smokes his first cigarette within 5 minutes of waking up he would be indicated for the 4 mg nicotine gum and not the 2 mg.  He also is interested in trying patches at this time.  We discussed the use of Zyban, and he is not interested in adding another antidepressant to his regimen.  Since he smoked about a pack a day we will start the 21 mg dose of the nicotine patch and plan to titrate downwards.    Hyperlipidemia: Stable.    Hx of Stroke: Stable.    ED: Stable.  If things are continuing not to be efficacious I would recommend he is bring this up with Dr. Bagley.    Supplements: Stable.      PLAN:                            1. Start the nicotine patch 21 mg patch for 24 hours then replace. In 6 weeks decrease to 14 mg for 2 weeks and then decrease to 7 mg for 2 weeks and then stop.  2. Chew the 4 mg nicotine gum and place in between lip and gum until tingling goes away then rechew.     Follow-up: Return in about 1 month (around 3/11/2022).    SUBJECTIVE/OBJECTIVE:                          Salas Macias is a 56 year old male coming in for an initial visit. He was referred to me from Abram Bagley MD.      Reason for visit: CMR.    Allergies/ADRs: Reviewed in chart  Past Medical History: Reviewed in chart  Tobacco: He reports that he quit smoking 5 days ago. He has never used smokeless tobacco.  Alcohol: none      Medication Adherence/Access: no issues reported    Schizophrenia: Currently taking fluoxetine 40 mg, alprazolam 1 mg three times a day, and olanzapine 20 mg daily. Reports good control of symptoms and no side effects.    Smoking cessation:  "Currently taking nicotine gum 2 mg. He stopped smoking \" a few days ago.\"    Smoking Cessation: Currently taking nicotine gum 2 mg but was just chewing and not parking.   How much does he smoke:  Pack a day  How long has he been smokin years  What is the most he ever smoked:  1.5 packs  What is the least he ever smoked:  0  Tried quitting in the past: Yes.  How many times:  \"so many times\".  For how long: \"not long\".  What worked/didn t work in the past: Nothing   Why did he relapse: stress  Why does he smoke: stress  How important is it for you to quit on a scale of 0 - 10? 10   Why does he want to quit (motivators for quitting): honor his mother.   How successfully do you think you can stop smoking with our help on scale of 0-10: 5  What scares you about quitting? nothing  How badly does he want to quit on a scale of 0 -10: 10   Triggers for smoking include:  Stress, seeing other people smoke    Fagerstrom Test to Assess Nicotine Dependence:  Points 0 1 2 3   1) How soon after you wake do you smoke your first cigarette? >1 hr 1/2 to 1 hr 6 to 30 min <5 min   2) Do you find it difficult to refrain from smoking in places where it is forbidden? No Yes     3) Which cigarette would you hate to give up the most? Any other 1st in the AM     4) How many cigarettes/day do you smoke? <10 11 to 20 21 to 30 >31   5) Do you smoke the most in the morning? No Yes     6) If you are so ill that you are in bed most of the day, do you still smoke? No Yes     Fagerstrom score 6   (0 - 5 = low to moderate nicotine dependence)  (6 - 10 = HIGH nicotine dependence)    Hyperlipidemia: Current therapy includes atorvastatin 20 mg daily.  Patient reports no significant myalgias or other side effects.  The 10-year ASCVD risk score (Anconanatasha TSE Jr., et al., 2013) is: 5.2%    Values used to calculate the score:      Age: 56 years      Sex: Male      Is Non- : No      Diabetic: No      Tobacco smoker: No      Systolic " Blood Pressure: 139 mmHg      Is BP treated: No      HDL Cholesterol: 52 mg/dL      Total Cholesterol: 172 mg/dL  Recent Labs   Lab Test 02/09/22  0849 11/18/20  1046 06/24/19  1033 10/22/14  0919   CHOL 172 148   < > 149   HDL 52 45   < > 47   * 81   < > 92   TRIG 48 112   < > 51   CHOLHDLRATIO  --   --   --  3.2    < > = values in this interval not displayed.     Hx of Stroke: Currently taking aspirin 81 mg. No issues with bleeding.  Patient reports that the stroke was within the last few months however he no longer has any stroke symptoms and did not really realize that he had one.    ED: Currently taking sildenafil 50 mg as needed.  Reports that he is not sure how well this works.    Supplements: Currently taking vitamin d 2000 units. No concerns or questions at this time.     Vitamin D Deficiency Screening Results:  Lab Results   Component Value Date    VITDT 24 02/09/2022    VITDT 19 (L) 10/26/2021    VITDT 8 (L) 04/19/2021    VITDT 13 (L) 10/22/2014       Today's Vitals: /82   Pulse 97   ----------------      I spent 30 minutes with this patient today. All changes were made via collaborative practice agreement with Abram Bagley MD. A copy of the visit note was provided to the patient's provider(s).    The patient was given a summary of these recommendations.     Abram Campbell, Pharm. D., [facility]  Medication Therapy Management Pharmacist  Direct Voicemail: [phone]     Medication Therapy Recommendations  Tobacco abuse    Current Medication: nicotine (NICORETTE) 2 MG gum (Discontinued)   Rationale: Dose too low - Dosage too low - Effectiveness   Recommendation: Increase Dose   Status: Accepted per CPA          Current Medication: nicotine (NICORETTE) 2 MG gum (Discontinued)   Rationale: Synergistic therapy - Needs additional medication therapy - Indication   Recommendation: Start Medication - Nicoderm CQ 14 MG/24HR Pt24   Status: Accepted per CPA

## 2022-02-11 NOTE — PATIENT INSTRUCTIONS
Recommendations from today's MTM visit:                                                    MTM (medication therapy management) is a service provided by a clinical pharmacist designed to help you get the most of out of your medicines.      1. Start the nicotine patch 21 mg patch for 24 hours then replace. In 6 weeks decrease to 14 mg for 2 weeks and then decrease to 7 mg for 2 weeks and then stop.  2. Chew the 4 mg nicotine gum and place in between lip and gum until tingling goes away then rechew.     Follow-up: March 11 at 12:30 pm    It was great to speak with you today.  I value your experience and would be very thankful for your time with providing feedback on our clinic survey. You may receive a survey via email or text message in the next few days.     To schedule another MTM appointment, please call the clinic directly or you may call the MTM scheduling line at [phone] or toll-free at [phone].     My Clinical Pharmacist's contact information:                                                      Please feel free to contact me with any questions or concerns you have.      Abram Campbell, Pharm. D., [facility]  Medication Therapy Management Pharmacist  Direct Voicemail: [phone]

## 2022-03-09 ENCOUNTER — OFFICE VISIT (OUTPATIENT)
Dept: NEUROLOGY | Facility: CLINIC | Age: 57
End: 2022-03-09
Payer: COMMERCIAL

## 2022-03-09 DIAGNOSIS — R20.0 NUMBNESS: ICD-10-CM

## 2022-03-09 DIAGNOSIS — Z71.6 ENCOUNTER FOR SMOKING CESSATION COUNSELING: ICD-10-CM

## 2022-03-09 DIAGNOSIS — Z12.5 SCREENING PSA (PROSTATE SPECIFIC ANTIGEN): ICD-10-CM

## 2022-03-09 PROCEDURE — 95911 NRV CNDJ TEST 9-10 STUDIES: CPT | Performed by: PSYCHIATRY & NEUROLOGY

## 2022-03-09 NOTE — PROGRESS NOTES
[facility]  Electrodiagnostic Laboratory                 Department of Neurology                                                                                                         Test Date:  3/9/2022    Patient: Salas Macias : 1965 Physician: Abram Marroquin MD   Sex: Male AGE: 56 year Ref Phys:    ID#: [id number]   Technician: Kristy Behling     History and Examination:  Salas Macias is a 56 year old man with numbness in the first two digits of both hands, greater on the left. He is referred for consideration of possible median neuropathy at the wrist or other neuropathic etiologies of these symptoms.     Techniques:  Motor conduction studies were done with surface recording electrodes. Sensory conduction studies were performed with surface electrodes, unless indicated otherwise by (n), designating the use of subdermal recording electrodes. Temperature was monitored and recorded throughout the study. Upper extremities were maintained at a temperature of 31 degrees Centigrade or higher.  The patient expressed considerable concern regarding needle electromyography, which was ultimately deferred for this reason.     Results:  Bilateral median and ulnar sensory and mixed nerve conduction studies were normal. A right radial sensory conduction study was normal. Bilateral median motor conduction studies demonstrated minimal relative prolongation of the left median distal latency to the second lumbrical muscle and were otherwise normal. Bilateral ulnar motor conduction studies demonstrated mild to moderate left ulnar conduction slowing across the elbow.     Interpretation:  This is an abnormal study, demonstrating electrophysiologic evidence of the followin. 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  2. Possible minimal left median neuropathy at the wrist. As there is a single borderline finding in only one of several parameters, a definite diagnosis of median neuropathy  at the wrist cannot be made.     Comment:  Needle electromyography was deferred because of the patient's reluctance to undergo this testing. If cervical radiculopathy is strongly suspected clinically, consider electromyography at a later date or imaging studies.       _____________________________  Abram Marroquin MD  Board Certified in Clinical Neurophysiology and Neuromuscular Medicine        Nerve Conduction Studies  Motor Sites      Latency Amplitude Neg. Amp Diff Segment Distance Velocity Neg. Dur Neg Area Diff Temperature Comment   Site (ms) Norm (mV) Norm %  cm m/s Norm ms %  C    Left Median (APB) Motor   Wrist 3.9  < 4.2 10.3  > 5.0  Wrist-APB 8   5.4  32.1    Elbow 8.7 - 9.7 - -5.8 Elbow-Wrist 24 50  > 48 5.9 0.68 32.1    Right Median (APB) Motor   Wrist 3.5  < 4.2 10.3  > 5.0  Wrist-APB 8   6.0  31.7    Elbow 8.6 - 10.2 - -0.97 Elbow-Wrist 25 49  > 48 5.9 -0.32 31.8    Left Median/Ulnar (Lumb-Dors Int II) Motor        Median (Lumb I)   Wrist 3.5 - 1.11 -      7.6  32         Ulnar (Dorsal Int (manus))   Wrist 2.9 - 6.7 -      5.8  32.8    Right Median/Ulnar (Lumb-Dors Int II) Motor        Median (Lumb I)   Wrist 3.8 - 1.21 -      7.9  32         Ulnar (Dorsal Int (manus))   Wrist 3.5 - 6.5 -      5.1  32.8    Left Ulnar (ADM) Motor   Wrist 3.2  < 3.5 11.2  > 5.0  Wrist-ADM 8   5.3  32.1    Bel Elbow 6.8 - 9.0 - -19.6 Bel Elbow-Wrist 22 61  > 48 5.6 -19.2 32.1    Abv Elbow 9.2 - 8.1 - -10.0 Abv Elbow-Bel Elbow 9 38  > 48 5.9 2.9 32.1    Right Ulnar (ADM) Motor   Wrist 3.1  < 3.5 9.4  > 5.0  Wrist-ADM 8   5.7  32    Bel Elbow 7.4 - 7.8 - -17.0 Bel Elbow-Wrist 21 49  > 48 6.1 -11.9 32.1    Abv Elbow 9.2 - 7.6 - -2.6 Abv Elbow-Bel Elbow 9 50  > 48 6.2 1.13 32.1    Left Ulnar (FDI) Motor   Wrist 4.4 - 16.0 -      5.0  32    Bel Elbow 8.3 - 11.2 - -30.0 Bel Elbow-Wrist 21 54  > 48 5.2 -30.3 32    Abv Elbow 10.2 - 10.7 - -4.5 Abv Elbow-Bel Elbow 9 47  > 48 5.2 -1.41 32      Sensory Sites      Onset Lat Peak Lat Amp  (O-P) Amp (P-P) Segment Distance Velocity Temperature   Site ms ms  V Norm  V  cm m/s Norm  C   Left Median Sensory   Wrist-Dig II 2.6 3.4 27  > 10 42 Wrist-Dig II 14 54  > 48 31   Right Median Sensory   Wrist-Dig II 2.6 3.5 32  > 10 43 Wrist-Dig II 14 54  > 48 31.9   Left Median-Ulnar Palmar Sensory        Median   Palm-Wrist 1.48 1.98 35 - 57 Palm-Wrist - - - 31.9        Ulnar   Palm-Wrist 1.48 2.1 10 - 11 Palm-Wrist - - - 32   Right Median-Ulnar Palmar Sensory        Median   Palm-Wrist 1.40 1.90 28 - 48 Palm-Wrist - - - 31.5        Ulnar   Palm-Wrist 1.50 2.1 11 - 10 Palm-Wrist - - - 31.6   Right Radial Sensory   Forearm-Wrist 2.1 2.7 28  > 15 34 Forearm-Wrist 11 52 - 32.2   Left Ulnar Sensory   Wrist-Dig V 2.6 3.4 10  > 8 31 Wrist-Dig V 12.5 48  > 48 30.2   Right Ulnar Sensory   Wrist-Dig V 2.5 3.9 20  > 8 40 Wrist-Dig V 12.5 50  > 48 32     Inter-Nerve Comparisons     Nerve 1 Value 1 Nerve 2 Value 2 Parameter Result Normal   Sensory Sites   R Median Palm-Wrist 1.9 ms R Ulnar Palm-Wrist 2.1 ms Peak Lat Diff 0.20 ms <0.30   L Median Palm-Wrist 2.0 ms L Ulnar Palm-Wrist 2.1 ms Peak Lat Diff 0.12 ms <0.30     F Wave Studies     Min-F Max-F Dispersion Persistence Mean-F F-Norm L-R Mean-F L-R Mean-F Norm F/M Ratio F-M Lat (ms)   Left Ulnar (Abd Dig Min)  32.1  C   31.41 33.59 2.18 100.00 32.54 <36  <2.5 2.73 27.42           NCS Waveforms:    Motor                         Sensory

## 2022-03-09 NOTE — LETTER
3/9/2022       RE: Salas Macias  [address] [address]     Dear Colleague,    Thank you for referring your patient, Salas Macias, to the [facility] [place] [facility]. Please see a copy of my visit note below.                        [facility]  Electrodiagnostic Laboratory                 Department of Neurology                                                                                                         Test Date:  3/9/2022    Patient: Salas Macias : 1965 Physician: Abram Marroquin MD   Sex: Male AGE: 56 year Ref Phys:    ID#: [id number]   Technician: Kristy Behling     History and Examination:  Salas Macias is a 56 year old man with numbness in the first two digits of both hands, greater on the left. He is referred for consideration of possible median neuropathy at the wrist or other neuropathic etiologies of these symptoms.     Techniques:  Motor conduction studies were done with surface recording electrodes. Sensory conduction studies were performed with surface electrodes, unless indicated otherwise by (n), designating the use of subdermal recording electrodes. Temperature was monitored and recorded throughout the study. Upper extremities were maintained at a temperature of 31 degrees Centigrade or higher.  The patient expressed considerable concern regarding needle electromyography, which was ultimately deferred for this reason.     Results:  Bilateral median and ulnar sensory and mixed nerve conduction studies were normal. A right radial sensory conduction study was normal. Bilateral median motor conduction studies demonstrated minimal relative prolongation of the left median distal latency to the second lumbrical muscle and were otherwise normal. Bilateral ulnar motor conduction studies demonstrated mild to moderate left ulnar conduction slowing across the elbow.      Interpretation:  This is an abnormal study, demonstrating electrophysiologic evidence of the followin. 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  2. Possible minimal left median neuropathy at the wrist. As there is a single borderline finding in only one of several parameters, a definite diagnosis of median neuropathy at the wrist cannot be made.     Comment:  Needle electromyography was deferred because of the patient's reluctance to undergo this testing. If cervical radiculopathy is strongly suspected clinically, consider electromyography at a later date or imaging studies.       _____________________________  Abram Marroquin MD  Board Certified in Clinical Neurophysiology and Neuromuscular Medicine        Nerve Conduction Studies  Motor Sites      Latency Amplitude Neg. Amp Diff Segment Distance Velocity Neg. Dur Neg Area Diff Temperature Comment   Site (ms) Norm (mV) Norm %  cm m/s Norm ms %  C    Left Median (APB) Motor   Wrist 3.9  < 4.2 10.3  > 5.0  Wrist-APB 8   5.4  32.1    Elbow 8.7 - 9.7 - -5.8 Elbow-Wrist 24 50  > 48 5.9 0.68 32.1    Right Median (APB) Motor   Wrist 3.5  < 4.2 10.3  > 5.0  Wrist-APB 8   6.0  31.7    Elbow 8.6 - 10.2 - -0.97 Elbow-Wrist 25 49  > 48 5.9 -0.32 31.8    Left Median/Ulnar (Lumb-Dors Int II) Motor        Median (Lumb I)   Wrist 3.5 - 1.11 -      7.6  32         Ulnar (Dorsal Int (manus))   Wrist 2.9 - 6.7 -      5.8  32.8    Right Median/Ulnar (Lumb-Dors Int II) Motor        Median (Lumb I)   Wrist 3.8 - 1.21 -      7.9  32         Ulnar (Dorsal Int (manus))   Wrist 3.5 - 6.5 -      5.1  32.8    Left Ulnar (ADM) Motor   Wrist 3.2  < 3.5 11.2  > 5.0  Wrist-ADM 8   5.3  32.1    Bel Elbow 6.8 - 9.0 - -19.6 Bel Elbow-Wrist 22 61  > 48 5.6 -19.2 32.1    Abv Elbow 9.2 - 8.1 - -10.0 Abv Elbow-Bel Elbow 9 38  > 48 5.9 2.9 32.1    Right Ulnar (ADM) Motor   Wrist 3.1  < 3.5 9.4  > 5.0  Wrist-ADM 8   5.7  32    Bel Elbow 7.4 - 7.8 - -17.0 Bel Elbow-Wrist 21 49  > 48 6.1 -11.9 32.1     Abv Elbow 9.2 - 7.6 - -2.6 Abv Elbow-Bel Elbow 9 50  > 48 6.2 1.13 32.1    Left Ulnar (FDI) Motor   Wrist 4.4 - 16.0 -      5.0  32    Bel Elbow 8.3 - 11.2 - -30.0 Bel Elbow-Wrist 21 54  > 48 5.2 -30.3 32    Abv Elbow 10.2 - 10.7 - -4.5 Abv Elbow-Bel Elbow 9 47  > 48 5.2 -1.41 32      Sensory Sites      Onset Lat Peak Lat Amp (O-P) Amp (P-P) Segment Distance Velocity Temperature   Site ms ms  V Norm  V  cm m/s Norm  C   Left Median Sensory   Wrist-Dig II 2.6 3.4 27  > 10 42 Wrist-Dig II 14 54  > 48 31   Right Median Sensory   Wrist-Dig II 2.6 3.5 32  > 10 43 Wrist-Dig II 14 54  > 48 31.9   Left Median-Ulnar Palmar Sensory        Median   Palm-Wrist 1.48 1.98 35 - 57 Palm-Wrist - - - 31.9        Ulnar   Palm-Wrist 1.48 2.1 10 - 11 Palm-Wrist - - - 32   Right Median-Ulnar Palmar Sensory        Median   Palm-Wrist 1.40 1.90 28 - 48 Palm-Wrist - - - 31.5        Ulnar   Palm-Wrist 1.50 2.1 11 - 10 Palm-Wrist - - - 31.6   Right Radial Sensory   Forearm-Wrist 2.1 2.7 28  > 15 34 Forearm-Wrist 11 52 - 32.2   Left Ulnar Sensory   Wrist-Dig V 2.6 3.4 10  > 8 31 Wrist-Dig V 12.5 48  > 48 30.2   Right Ulnar Sensory   Wrist-Dig V 2.5 3.9 20  > 8 40 Wrist-Dig V 12.5 50  > 48 32     Inter-Nerve Comparisons     Nerve 1 Value 1 Nerve 2 Value 2 Parameter Result Normal   Sensory Sites   R Median Palm-Wrist 1.9 ms R Ulnar Palm-Wrist 2.1 ms Peak Lat Diff 0.20 ms <0.30   L Median Palm-Wrist 2.0 ms L Ulnar Palm-Wrist 2.1 ms Peak Lat Diff 0.12 ms <0.30     F Wave Studies     Min-F Max-F Dispersion Persistence Mean-F F-Norm L-R Mean-F L-R Mean-F Norm F/M Ratio F-M Lat (ms)   Left Ulnar (Abd Dig Min)  32.1  C   31.41 33.59 2.18 100.00 32.54 <36  <2.5 2.73 27.42           NCS Waveforms:    Motor                         Sensory                                  Abram Marroquin MD

## 2022-03-11 ENCOUNTER — OFFICE VISIT (OUTPATIENT)
Dept: PHARMACY | Facility: CLINIC | Age: 57
End: 2022-03-11
Payer: COMMERCIAL

## 2022-03-11 VITALS — HEART RATE: 62 BPM | SYSTOLIC BLOOD PRESSURE: 116 MMHG | DIASTOLIC BLOOD PRESSURE: 80 MMHG

## 2022-03-11 DIAGNOSIS — Z86.73 HISTORY OF STROKE: ICD-10-CM

## 2022-03-11 DIAGNOSIS — F20.0 PARANOID SCHIZOPHRENIA (H): Primary | ICD-10-CM

## 2022-03-11 DIAGNOSIS — Z72.0 TOBACCO ABUSE: ICD-10-CM

## 2022-03-11 PROCEDURE — 99606 MTMS BY PHARM EST 15 MIN: CPT | Performed by: PHARMACIST

## 2022-03-11 PROCEDURE — 99607 MTMS BY PHARM ADDL 15 MIN: CPT | Performed by: PHARMACIST

## 2022-03-11 RX ORDER — VARENICLINE TARTRATE 0.5 MG/1
TABLET, FILM COATED ORAL
Qty: 11 TABLET | Refills: 0 | Status: SHIPPED | OUTPATIENT
Start: 2022-03-11 | End: 2022-09-15

## 2022-03-11 RX ORDER — VARENICLINE TARTRATE 1 MG/1
1 TABLET, FILM COATED ORAL 2 TIMES DAILY
Qty: 60 TABLET | Refills: 3 | Status: SHIPPED | OUTPATIENT
Start: 2022-03-11 | End: 2022-09-15

## 2022-03-11 RX ORDER — NICOTINE 21 MG/24HR
1 PATCH, TRANSDERMAL 24 HOURS TRANSDERMAL EVERY 24 HOURS
Qty: 28 PATCH | Refills: 3 | Status: SHIPPED | OUTPATIENT
Start: 2022-03-11 | End: 2022-09-15

## 2022-03-11 NOTE — PATIENT INSTRUCTIONS
Recommendations from today's MTM visit:                                                       1. Take apo-varenicline 0.5 mg once daily for 3 days, then 0.5 mg twice daily fr 4 days, and then 1 mg twice a day thereafter.    Follow-up: Return in about 5 weeks (around 4/15/2022) for Follow up, with me, in person.    It was great to speak with you today.  I value your experience and would be very thankful for your time with providing feedback on our clinic survey. You may receive a survey via email or text message in the next few days.     To schedule another MTM appointment, please call the clinic directly or you may call the MTM scheduling line at [phone] or toll-free at [phone].     My Clinical Pharmacist's contact information:                                                      Please feel free to contact me with any questions or concerns you have.      Abram Campbell, Pharm. D., [facility]  Medication Therapy Management Pharmacist  Direct Voicemail: [phone]

## 2022-03-11 NOTE — PROGRESS NOTES
"Medication Therapy Management (MTM) Encounter    ASSESSMENT:                            Medication Adherence/Access: No issues identified    Schizophrenia:  Plan in place for psychiatry    Hx of Stroke:  Stable.    Smoking cessation:   Patient is having significant nicotine cravings despite being on the maximum dosage of the patch and chewing 3 pieces of gum at a time.  Recommended that you only use 1 g at a time.  He would benefit from alternative pharmacotherapy at this time.  We discussed the risk and benefits of Chantix.  And he would like to move forward with Chantix at this time.  He was told that if he experiences mood changes or worsening of his schizophrenia then he should stop the medication right away.  He notes that he is not worried about nightmares.    PLAN:                            1. Take apo-varenicline 0.5 mg once daily for 3 days, then 0.5 mg twice daily fr 4 days, and then 1 mg twice a day thereafter.    Follow-up: No follow-ups on file.    SUBJECTIVE/OBJECTIVE:                          Salas Macias is a 56 year old male coming in for a follow-up visit.  Today's visit is a follow-up MTM visit from 2/11/22     Reason for visit: medication follow-up.    Allergies/ADRs: Reviewed in chart  Past Medical History: Reviewed in chart  Tobacco: He reports that he quit smoking about 4 weeks ago. He has never used smokeless tobacco. He has started smoking again.      Medication Adherence/Access: no issues reported    Schizophrenia: Currently taking fluoxetine 40 mg, alprazolam 1 mg three times a day, and olanzapine 20 mg daily. He reports that the voices do come sometimes but \"its hard to say\" how often they happen. Reports that it happens when he gets really angry.     Hx of Stroke: Currently taking aspirin 81 mg. No issues with bleeding.  No concerns or questions at this time.     Smoking cessation:   How are you doing with your plan to quit smoking? Being around people who smoke has been influential " to make him smoke. These people live with him and argue with him when he asks them to not smoke around him       Patient reports things are not going well. - smoking about 3 per day.  What hasn t worked for you? Using 3 pieces of gum at a time.  What times or situations have you found the most challenging? When he is angry or around  Is there anything that worked well for you? Nicotine has been helping  How can you get back on your plan? New medication  What is a first step--today or tomorrow or in the next few days--that will enable you to do this? New medication     Are you experiencing any side effects to nicotine replacement therapies or medications you are using: increased appetite but he is happy about this.  Today's Vitals: There were no vitals taken for this visit.  ----------------      I spent 30 minutes with this patient today. All changes were made via collaborative practice agreement with Abram Bagley MD. A copy of the visit note was provided to the patient's provider(s).    The patient was given a summary of these recommendations.     Abram Campbell, Pharm. D., [facility]  Medication Therapy Management Pharmacist  Direct Voicemail: [phone]     Medication Therapy Recommendations  No medication therapy recommendations to display

## 2022-03-18 ENCOUNTER — TELEPHONE (OUTPATIENT)
Dept: INTERNAL MEDICINE | Facility: CLINIC | Age: 57
End: 2022-03-18

## 2022-04-04 ENCOUNTER — ANCILLARY PROCEDURE (OUTPATIENT)
Dept: CARDIOLOGY | Facility: CLINIC | Age: 57
End: 2022-04-04
Attending: STUDENT IN AN ORGANIZED HEALTH CARE EDUCATION/TRAINING PROGRAM
Payer: COMMERCIAL

## 2022-04-04 DIAGNOSIS — H53.462 HOMONYMOUS HEMIANOPIA, LEFT: ICD-10-CM

## 2022-04-04 DIAGNOSIS — I63.9 OCCIPITAL INFARCTION (H): ICD-10-CM

## 2022-04-04 LAB — LVEF ECHO: NORMAL

## 2022-04-04 PROCEDURE — 93306 TTE W/DOPPLER COMPLETE: CPT | Performed by: INTERNAL MEDICINE

## 2022-04-05 ENCOUNTER — CARE COORDINATION (OUTPATIENT)
Dept: NEUROLOGY | Facility: CLINIC | Age: 57
End: 2022-04-05
Payer: COMMERCIAL

## 2022-04-05 NOTE — PROGRESS NOTES
All results are normal regarding echocardiogram.  No change in prior recommendations regarding stroke prophylaxis.    =================================  Called and left a vm with the above information per Dr Calderon

## 2022-04-17 NOTE — PROGRESS NOTES
"HPI:    Last visit with us 2/9/2022. Patient report hand numbness has worsened since last visit, ongoing for over a year per patient. Reviewed nerve study with patient. Patient says left side is worse than right. Says it it only the index and thumb portion of his hand. Doesn't hurt when he puts on a glove, gets worse when he drops his hands by his side. Patient reports loss of appetite, gets full very fast. Issue on going for a \"little while.\" Patient reports that his friends notice him eating less. He denies abusing EtOH, trying to quit smoking. No stomach pain per patient. Patient is able to get his food down, he just doesn't eat it as much. Stomach gurgling upon exam. Patient has never had a colonoscopy, had a negative FIT test 11/18/2020. Patient is agreeable to MRI of his neck and seeing nerve doctor. Patient concerned about being poked and shocked more, assured patient this wouldn't be the case. Patient agreeable to lab work and CT scan of his stomach. Patient agreeable to follow up. Patient declines vaccines today. Patient says he has gone from a pack a day to 4-5 cigarettes a day.         Past Medical History:   Diagnosis Date     Depression      Paranoid schizophrenia (H)      No past surgical history on file.    PE;     Vitals noted, gen, nad, cooperative, alert, neck supple nl rom, lungs with good air movement, RRR, S1, S2, no MRG, abdomen, positive bowel sounds, no acute findings. He has intact B hand sensation and strength.     Resting echo 4/4/2019:     Procedure  Echocardiogram with two-dimensional, color and spectral Doppler performed.  Good quality two-dimensional was performed and interpreted.  ______________________________________________________________________________  Interpretation Summary  Global and regional left ventricular function is normal with an EF of 55-60%.  Right ventricular function, chamber size, wall motion, and thickness are  normal.  No significant valvular abnormalities were " noted.  Previous study not available for comparison.  ______________________________________________________________________________  Left Ventricle  Left ventricular size is normal. Left ventricular wall thickness is normal.  Global and regional left ventricular function is normal with an EF of 55-60%.  Left ventricular diastolic function is normal. Diastolic Doppler findings  (E/E' ratio and/or other parameters) suggest left ventricular filling  pressures are normal.     Right Ventricle  Right ventricular function, chamber size, wall motion, and thickness are  normal.     Atria  Both atria appear normal.     Mitral Valve  The mitral valve is normal.     Aortic Valve  Aortic valve is normal in structure and function. The aortic valve is  tricuspid.     Tricuspid Valve  The tricuspid valve is normal. Trace tricuspid insufficiency is present. The  peak velocity of the tricuspid regurgitant jet is not obtainable. Pulmonary  artery systolic pressure cannot be assessed.     Pulmonic Valve  The pulmonic valve is normal. Trace pulmonic insufficiency is present.     Vessels  The aorta root is normal. The pulmonary artery cannot be assessed. The  inferior vena cava was normal in size with preserved respiratory variability.  IVC diameter <2.1 cm collapsing >50% with sniff suggests a normal RA pressure  of 3 mmHg.     Pericardium  No pericardial effusion is present.     Compared to Previous Study  Previous study not available for comparison.  ______________________________________________________________________________  MMode/2D Measurements & Calculations     IVSd: 1.0 cm  LVIDd: 4.5 cm  LVIDs: 2.7 cm  LVPWd: 0.89 cm  FS: 40.0 %  LV mass(C)d: 144.3 grams  LV mass(C)dI: 69.3 grams/m2  Ao root diam: 3.5 cm  LA dimension: 3.3 cm  LA/Ao: 0.93  LVOT diam: 2.2 cm  LVOT area: 3.9 cm2  LA Volume (BP): 47.3 ml  LA Volume Index (BP): 22.7 ml/m2  RWT: 0.40     Doppler Measurements & Calculations  MV E max jeb: 44.6 cm/sec  MV A max  jeb: 58.0 cm/sec  MV E/A: 0.77  MV dec slope: 153.3 cm/sec2  MV dec time: 0.29 sec  PA acc time: 0.08 sec  E/E' av.9  Lateral E/e': 4.2  Medial E/e': 5.7     Report  approved by: Nighat Metzger 2022 09:00 AM      A/P:    1. MTM appt. With Abram Campbell 2022  2. Increased lipids on Atorvastatin; lipids , HDL 52, and TG's 48  3. Smoking cessation on Nicotine replacement and Chantix  4. On Olanzapine and Fluoxetine; Psychiatry in [place]   5. Immunizations; Tdap 2021. COVID vaccine; he declines all COVID vaccines.  Check with insurance regarding Shingrix  6. PSA; 1.18 on 2022  7. Vitamin D normal low on 2022 at 24  8. Somewhat low platelets 144 on 2022 and ordered repeat labs 2022 including Hep B and Hep C, and HIV. Ordered future abdominal U/S on 2022  9. MRI brain 2022 with Late subacute R parieto-occipital infarct. See neurology Dr. Young note from 2022. H/o L homonymous hemianopsia. Seen in Ophthalmology, Dr. Perez 2022  10. Resting echo as above   11. Negative FIT testing 2020; Colonoscopy? He declines any colonoscopy studies  12. EMG done 3/9/2022; with Mild L ulnar neuropathy, and Possible L median neuropathy. Ordered A1c and will get MRI cervical spine and neurology referral.   13. Abdominal complaints. Ordered lipase/amylase and CT abdominal/pelvic imaging    Will follow up in about 3 weeks          40 minutes spent on the date of the encounter doing chart review, history and exam, documentation and further activities as noted above

## 2022-04-18 ENCOUNTER — LAB (OUTPATIENT)
Dept: LAB | Facility: CLINIC | Age: 57
End: 2022-04-18
Payer: COMMERCIAL

## 2022-04-18 ENCOUNTER — OFFICE VISIT (OUTPATIENT)
Dept: INTERNAL MEDICINE | Facility: CLINIC | Age: 57
End: 2022-04-18
Payer: COMMERCIAL

## 2022-04-18 VITALS
HEART RATE: 85 BPM | WEIGHT: 189 LBS | SYSTOLIC BLOOD PRESSURE: 129 MMHG | BODY MASS INDEX: 25.05 KG/M2 | DIASTOLIC BLOOD PRESSURE: 89 MMHG | RESPIRATION RATE: 16 BRPM | OXYGEN SATURATION: 98 % | HEIGHT: 73 IN

## 2022-04-18 DIAGNOSIS — R20.0 HAND NUMBNESS: ICD-10-CM

## 2022-04-18 DIAGNOSIS — H53.462 HOMONYMOUS HEMIANOPIA, LEFT: ICD-10-CM

## 2022-04-18 DIAGNOSIS — R10.13 ABDOMINAL PAIN, EPIGASTRIC: ICD-10-CM

## 2022-04-18 DIAGNOSIS — R79.89 PLATELET COUNT LESS 140,000 PER CUBIC MILLIMETER: Primary | ICD-10-CM

## 2022-04-18 DIAGNOSIS — R79.89 PLATELET COUNT LESS 140,000 PER CUBIC MILLIMETER: ICD-10-CM

## 2022-04-18 DIAGNOSIS — I63.9 OCCIPITAL INFARCTION (H): ICD-10-CM

## 2022-04-18 LAB
AMYLASE SERPL-CCNC: 91 U/L (ref 30–110)
BASOPHILS # BLD AUTO: 0 10E3/UL (ref 0–0.2)
BASOPHILS NFR BLD AUTO: 0 %
CHOLEST SERPL-MCNC: 173 MG/DL
EOSINOPHIL # BLD AUTO: 0.1 10E3/UL (ref 0–0.7)
EOSINOPHIL NFR BLD AUTO: 1 %
ERYTHROCYTE [DISTWIDTH] IN BLOOD BY AUTOMATED COUNT: 15.1 % (ref 10–15)
FASTING STATUS PATIENT QL REPORTED: NO
HBA1C MFR BLD: 6.1 % (ref 0–5.6)
HCT VFR BLD AUTO: 45.9 % (ref 40–53)
HDLC SERPL-MCNC: 52 MG/DL
HGB BLD-MCNC: 14.6 G/DL (ref 13.3–17.7)
IMM GRANULOCYTES # BLD: 0 10E3/UL
IMM GRANULOCYTES NFR BLD: 0 %
LDLC SERPL CALC-MCNC: 107 MG/DL
LIPASE SERPL-CCNC: 49 U/L (ref 73–393)
LYMPHOCYTES # BLD AUTO: 2.7 10E3/UL (ref 0.8–5.3)
LYMPHOCYTES NFR BLD AUTO: 29 %
MCH RBC QN AUTO: 28.5 PG (ref 26.5–33)
MCHC RBC AUTO-ENTMCNC: 31.8 G/DL (ref 31.5–36.5)
MCV RBC AUTO: 90 FL (ref 78–100)
MONOCYTES # BLD AUTO: 1 10E3/UL (ref 0–1.3)
MONOCYTES NFR BLD AUTO: 11 %
NEUTROPHILS # BLD AUTO: 5.3 10E3/UL (ref 1.6–8.3)
NEUTROPHILS NFR BLD AUTO: 59 %
NONHDLC SERPL-MCNC: 121 MG/DL
NRBC # BLD AUTO: 0 10E3/UL
NRBC BLD AUTO-RTO: 0 /100
PLATELET # BLD AUTO: 192 10E3/UL (ref 150–450)
RBC # BLD AUTO: 5.12 10E6/UL (ref 4.4–5.9)
RETICS # AUTO: 0.07 10E6/UL (ref 0.03–0.1)
RETICS/RBC NFR AUTO: 1.4 % (ref 0.5–2)
TRIGL SERPL-MCNC: 69 MG/DL
TSH SERPL DL<=0.005 MIU/L-ACNC: 1.04 MU/L (ref 0.4–4)
WBC # BLD AUTO: 9.1 10E3/UL (ref 4–11)

## 2022-04-18 PROCEDURE — 85060 BLOOD SMEAR INTERPRETATION: CPT | Performed by: PATHOLOGY

## 2022-04-18 PROCEDURE — 99215 OFFICE O/P EST HI 40 MIN: CPT | Performed by: INTERNAL MEDICINE

## 2022-04-18 PROCEDURE — 85045 AUTOMATED RETICULOCYTE COUNT: CPT | Performed by: PATHOLOGY

## 2022-04-18 PROCEDURE — 86780 TREPONEMA PALLIDUM: CPT | Mod: 90 | Performed by: PATHOLOGY

## 2022-04-18 PROCEDURE — 82150 ASSAY OF AMYLASE: CPT | Mod: 90 | Performed by: PATHOLOGY

## 2022-04-18 PROCEDURE — 86704 HEP B CORE ANTIBODY TOTAL: CPT | Mod: 90 | Performed by: PATHOLOGY

## 2022-04-18 PROCEDURE — 83036 HEMOGLOBIN GLYCOSYLATED A1C: CPT | Mod: 90 | Performed by: PATHOLOGY

## 2022-04-18 PROCEDURE — 83690 ASSAY OF LIPASE: CPT | Mod: 90 | Performed by: PATHOLOGY

## 2022-04-18 PROCEDURE — 87389 HIV-1 AG W/HIV-1&-2 AB AG IA: CPT | Mod: 90 | Performed by: PATHOLOGY

## 2022-04-18 PROCEDURE — 84443 ASSAY THYROID STIM HORMONE: CPT | Mod: 90 | Performed by: PATHOLOGY

## 2022-04-18 PROCEDURE — 80061 LIPID PANEL: CPT | Mod: 90 | Performed by: PATHOLOGY

## 2022-04-18 PROCEDURE — 86803 HEPATITIS C AB TEST: CPT | Mod: 90 | Performed by: PATHOLOGY

## 2022-04-18 PROCEDURE — 86706 HEP B SURFACE ANTIBODY: CPT | Mod: 90 | Performed by: PATHOLOGY

## 2022-04-18 PROCEDURE — 85025 COMPLETE CBC W/AUTO DIFF WBC: CPT | Performed by: PATHOLOGY

## 2022-04-18 PROCEDURE — 36415 COLL VENOUS BLD VENIPUNCTURE: CPT | Performed by: PATHOLOGY

## 2022-04-18 PROCEDURE — 87340 HEPATITIS B SURFACE AG IA: CPT | Mod: 90 | Performed by: PATHOLOGY

## 2022-04-18 PROCEDURE — 99000 SPECIMEN HANDLING OFFICE-LAB: CPT | Performed by: PATHOLOGY

## 2022-04-18 RX ORDER — FLUOXETINE 40 MG/1
60 CAPSULE ORAL DAILY
COMMUNITY
Start: 2022-04-08 | End: 2023-09-20

## 2022-04-18 ASSESSMENT — PAIN SCALES - GENERAL: PAINLEVEL: NO PAIN (0)

## 2022-04-18 NOTE — PATIENT INSTRUCTIONS
Thank you for visiting the Primary Care Center today at the [facility]! The following is some information about our clinic:     Primary Care Center Frequently-Asked Questions    (1) How do I schedule appointments at the [facility]?     Primary Care--to schedule or make changes to an existing appointment, please call our primary care line at [phone].    Labs--to schedule a lab appointment at the [facility] you can use [employer] or call [phone]. If you have a [place] location that is closer to home, you can reach out to that location for scheduling options.     Imaging--if you need to schedule a CT, X-ray, MRI, ultrasound, or other imaging study you can call [phone] to schedule at the [facility] or any other [facility] imaging location.     Referrals--if a referral to another specialty was ordered you can expect a phone call from their scheduling team. If you have not heard from them in a week, please call us or send us a [employer] message to check the status or get a scheduling number. Please note that this only applies to internal [facility] referrals. If the referral is external you would need to contact their office for scheduling.     (2) I have a question about my visit, who do I contact?     You can call us at the primary care line at [phone] to ask questions about your visit. You can also send a secure message through [employer], which is reviewed by clinic staff. Please note that [employer] messages have a twenty-four to forty-eight business hour turnaround time and should not be used for urgent concerns.    (3) How will I get the results of my tests?    If you are signed up for [employer] all tests will be released to you within twenty-four hours of resulting. Please allow three to five days for your doctor to review your results and place a note interpreting the results. If you do not have [employer] you will receive your  results through mail seven to ten business days following the return of the tests. Please note that if there should be any urgent or concerning results that your doctor or their registered nurse will reach out to you the same day as the tests come back. If you have follow up questions about your results or would like to discuss the results in detail please schedule a follow up with your provider either in person or virtually.     (4) How do I get refills of my prescriptions?     You should always first contact your pharmacy for refills of your medications. If submitting a refill request on [employer], please be sure to submit the request only once--repeat requests can cause delays in refill. If you are requesting a NEW medication or a medication related to new symptoms you will need to schedule an appointment with a provider prior to approval. Please note: Routine medication refills have up to one to three business day turnaround whereas controlled substances refills have up to five to seven business day turnaround.    (5) I have new symptoms, what do I do?     If you are having an immediate medical emergency, you should dial 911 for assistance.   For anything urgent that needs to be seen within a few hours to one day you should visit a local urgent care for assistance.  For non-urgent symptoms that need to be seen within a few days to a week you can schedule with an available provider in primary care by going to [employer] or calling [phone].   If you are not sure how serious your symptoms are or you would like to receive medical advice you can always call [phone] to speak with a triage nurse.

## 2022-04-18 NOTE — NURSING NOTE
Salas Macias is a 57 year old male patient that presents today in clinic for the following:    Chief Complaint   Patient presents with     RECHECK     Patient comes for a follow up.      The patient's allergies and medications were reviewed as noted. A set of vitals were recorded as noted without incident. The patient does not have any other questions for the provider.    Salas Miles, EMT at 6:20 PM on 4/18/2022

## 2022-04-19 LAB
HBV CORE AB SERPL QL IA: NONREACTIVE
HBV SURFACE AB SERPL IA-ACNC: 0.62 M[IU]/ML
HBV SURFACE AG SERPL QL IA: NONREACTIVE
HCV AB SERPL QL IA: NONREACTIVE
HIV 1+2 AB+HIV1 P24 AG SERPL QL IA: NONREACTIVE
PATH REPORT.COMMENTS IMP SPEC: NORMAL
PATH REPORT.FINAL DX SPEC: NORMAL
PATH REPORT.MICROSCOPIC SPEC OTHER STN: NORMAL
PATH REPORT.MICROSCOPIC SPEC OTHER STN: NORMAL
PATH REPORT.RELEVANT HX SPEC: NORMAL
T PALLIDUM AB SER QL: NONREACTIVE

## 2022-05-08 NOTE — PROGRESS NOTES
HPI:    Last visit with us 4/18/2022. He states some chronic L hand  Numbness and warm B LE legs. He still has not gotten the recommended cervical MRI scan. Still with THOMAS. He had unremarkable 4/4/2022 resting cardiac echo. No SOB. He is still smoking. No other HEENT, cardiopulmonary, abdominal, , neurological, systemic, psychiatric, lymphatic, endocrine, vascular complaints.     Past Medical History:   Diagnosis Date     Depression      Paranoid schizophrenia (H)      No past surgical history on file.    PE:    Vitals noted, gen, nad, cooperative, alert, neck supple nl rom, lungs with good air movement, RRR, S1, S2, no MRG, abdomen, no acute findings. Grossly normal neurological exam.     A/P:    1. Immunizations; Tdap 9/26/2021. 4/18/2022 Hep B antibody to surface antigen negative and could get Hep B vaccine series. He declines all other immunizations. He declines hepatitis B vaccine series. He declines COVID vaccines.   2. Increased lipids on Atorvastatin; 4/18/2022;  and HDL 52  3. On nicotine replacement and Chantix for smoking. See MTM note Abram Campbell 3/11/2022  4. Psychiatry on Olanzapine and Fluoxetine  5. On vitamin D replacement; 24 on 2/9/2022   6. Neurology appt. With Dr. Gunter for numbness 8/15/2022. EMG done 3/9/2022 with mild L  Ulnar neuropathy. Ordered MRI cervical spine 4/18/2022 and not done yet ane he will schedule.   7. TSH checked normal 4/18/2022  8. A1c 4/18/2022 6.1%  9. Low platelets 141 on 2/9/2022 and ordered repeat labs. Ordered abdominal U/S 4/18/2022 and not done yet  10. PSA 1.18 on 2/9/2022  11. Visit on 4/18/2022 for abdominal complaints. Ordered abdominal/pelvic CT scan but not done. Amylase and Lipase normal.   12. Colonoscopy; he had negative FIT testing 11/18/2022 and declines all colonoscopy for colon cancer screening.   13. SOB/THOMAS; he had resting echo 4/4/2022. CXR today and Latisha scan. He may benefit from PFT's. Also given smoking history may qualify for CT screen  "for lung cancer      30 minutes spent on the date of the encounter doing chart review, history and exam, documentation and further activities as noted above \"exclusive of procedures and other billable interpretations  "

## 2022-05-09 ENCOUNTER — OFFICE VISIT (OUTPATIENT)
Dept: INTERNAL MEDICINE | Facility: CLINIC | Age: 57
End: 2022-05-09
Payer: COMMERCIAL

## 2022-05-09 VITALS
BODY MASS INDEX: 25.05 KG/M2 | OXYGEN SATURATION: 98 % | WEIGHT: 189 LBS | HEIGHT: 73 IN | SYSTOLIC BLOOD PRESSURE: 126 MMHG | HEART RATE: 86 BPM | DIASTOLIC BLOOD PRESSURE: 90 MMHG

## 2022-05-09 DIAGNOSIS — R06.02 SOB (SHORTNESS OF BREATH): Primary | ICD-10-CM

## 2022-05-09 PROCEDURE — 99214 OFFICE O/P EST MOD 30 MIN: CPT | Performed by: INTERNAL MEDICINE

## 2022-05-09 NOTE — PATIENT INSTRUCTIONS
Thank you for visiting the Primary Care Center today at the [facility]! The following is some information about our clinic:     Primary Care Center Frequently-Asked Questions    (1) How do I schedule appointments at the [facility]?     Primary Care--to schedule or make changes to an existing appointment, please call our primary care line at [phone].    Labs--to schedule a lab appointment at the [facility] you can use [employer] or call [phone]. If you have a [place] location that is closer to home, you can reach out to that location for scheduling options.     Imaging--if you need to schedule a CT, X-ray, MRI, ultrasound, or other imaging study you can call [phone] to schedule at the [facility] or any other [facility] imaging location.     Referrals--if a referral to another specialty was ordered you can expect a phone call from their scheduling team. If you have not heard from them in a week, please call us or send us a [employer] message to check the status or get a scheduling number. Please note that this only applies to internal [facility] referrals. If the referral is external you would need to contact their office for scheduling.     (2) I have a question about my visit, who do I contact?     You can call us at the primary care line at [phone] to ask questions about your visit. You can also send a secure message through [employer], which is reviewed by clinic staff. Please note that [employer] messages have a twenty-four to forty-eight business hour turnaround time and should not be used for urgent concerns.    (3) How will I get the results of my tests?    If you are signed up for [employer] all tests will be released to you within twenty-four hours of resulting. Please allow three to five days for your doctor to review your results and place a note interpreting the results. If you do not have [employer] you will receive your  results through mail seven to ten business days following the return of the tests. Please note that if there should be any urgent or concerning results that your doctor or their registered nurse will reach out to you the same day as the tests come back. If you have follow up questions about your results or would like to discuss the results in detail please schedule a follow up with your provider either in person or virtually.     (4) How do I get refills of my prescriptions?     You should always first contact your pharmacy for refills of your medications. If submitting a refill request on [employer], please be sure to submit the request only once--repeat requests can cause delays in refill. If you are requesting a NEW medication or a medication related to new symptoms you will need to schedule an appointment with a provider prior to approval. Please note: Routine medication refills have up to one to three business day turnaround whereas controlled substances refills have up to five to seven business day turnaround.    (5) I have new symptoms, what do I do?     If you are having an immediate medical emergency, you should dial 911 for assistance.   For anything urgent that needs to be seen within a few hours to one day you should visit a local urgent care for assistance.  For non-urgent symptoms that need to be seen within a few days to a week you can schedule with an available provider in primary care by going to [employer] or calling [phone].   If you are not sure how serious your symptoms are or you would like to receive medical advice you can always call [phone] to speak with a triage nurse.

## 2022-05-09 NOTE — NURSING NOTE
Chief Complaint   Patient presents with     Recheck Medication       ERAN Hicks at 12:46 PM on 5/9/2022.

## 2022-05-10 ENCOUNTER — TELEPHONE (OUTPATIENT)
Dept: INTERNAL MEDICINE | Facility: CLINIC | Age: 57
End: 2022-05-10
Payer: COMMERCIAL

## 2022-05-10 NOTE — TELEPHONE ENCOUNTER
Spoke with patient to schedule NM lexiscan test placed by Dr Bagley at last visit 5/9/22. Patient was warm transfer to imaging to set up appointment.

## 2022-06-07 ENCOUNTER — TELEPHONE (OUTPATIENT)
Dept: INTERNAL MEDICINE | Facility: CLINIC | Age: 57
End: 2022-06-07
Payer: COMMERCIAL

## 2022-06-07 NOTE — TELEPHONE ENCOUNTER
No show 6/7/2022    HPI:    Past Medical History:   Diagnosis Date     Depression      Paranoid schizophrenia (H)      No past surgical history on file.    PE:    Vitals noted, gen, nad, cooperative, alert      A/P:    1. Immunizations; Tdap 9/26/2021. 4/18/2022 Hep B antibody to surface antigen negative and could get Hep B vaccine series. He declines all other immunizations. He declines hepatitis B vaccine series. He declines COVID vaccines.   2. Increased lipids on Atorvastatin; 4/18/2022;  and HDL 52  3. On nicotine replacement and Chantix for smoking. See MTM note Abram Campbell 3/11/2022  4. Psychiatry on Olanzapine and Fluoxetine  5. On vitamin D replacement; 24 on 2/9/2022   6. Neurology appt. With Dr. Gunter for numbness 8/15/2022. EMG done 3/9/2022 with mild L  Ulnar neuropathy. Ordered MRI cervical spine 4/18/2022 and not done yet ane he will schedule.   7. TSH checked normal 4/18/2022  8. A1c 4/18/2022 6.1%  9. Low platelets 141 on 2/9/2022 and ordered repeat labs 2/10/2022 but not done. Ordered abdominal U/S 4/18/2022 and not done yet  10. PSA 1.18 on 2/9/2022  11. Visit on 4/18/2022 for abdominal complaints. Ordered abdominal/pelvic CT scan but not done. Amylase and Lipase normal.   12. Colonoscopy; he had negative FIT testing 11/18/2022 and declines all colonoscopy for colon cancer screening.   13. SOB/THOMAS; he had resting echo 4/4/2022. CXR 5/9/2022 no acute air-space disease and Latisha scan ordered 5/9/2022 but not scheduled. He may benefit from PFT's. Also given smoking history may qualify for CT screen for lung cancer

## 2022-08-15 NOTE — PROGRESS NOTES
HPI:    Mr. Macias comes in follow up today. Still with L hand numbness and some weakness. He is L handed. He did not get the ordered Cervical MRI study. He is trying to cut back on smoking and feels that the nicotine gum is benefical. No other HEENT, cardiopulmonary, abdominal, , neurological, systemic, psychiatric, lymphatic, endocrine, vascular complaints.     Past Medical History:   Diagnosis Date     Depression      Paranoid schizophrenia (H)      No past surgical history on file.    PE:    Vitals noted, gen, nad, cooperative, alert, neck supple, nl rom, lungs with fair air movement, RRR, S1, S2, no MRG, abdomen no acute findings. L hand with some slight decreased weakness R hand.     A/P:    1. Immunizations; Tdap 9/26/2021. 4/18/2022 Hep B antibody to surface antigen negative and could get Hep B vaccine series. He declines all other immunizations. He declines hepatitis B vaccine series. He declines COVID vaccines.   2. Increased lipids on Atorvastatin; 4/18/2022;  and HDL 52  3. On nicotine replacement and Chantix for smoking. See MTM note Abram Campbell 3/11/2022. Reordered nicotine gum today 8/17/2022  4. Psychiatry on Olanzapine and Fluoxetine  5. On vitamin D replacement; 24 on 2/9/2022   6. Neurology appt. With Dr. Gunter for numbness cancelled on 8/15/2022. EMG done 3/9/2022 with mild L  Ulnar neuropathy. Ordered MRI cervical spine 4/18/2022 and not done yet. He has  Neurology follow up with Dr Calderon 12/14/2022. He was seen 1/19/2022, Neurology, Dr. Young. He ordered Echo with bubble (not done), CTA head and neck (not done). He was to follow up with Dr. Young in 3 months but he did not schedule. Reordered Cervical MRI scan today   7. TSH checked normal 4/18/2022  8. A1c 4/18/2022 6.1%  9. Low platelets 141 on 2/9/2022 and ordered repeat labs. Ordered abdominal U/S 4/18/2022 and not done yet  10. PSA 1.18 on 2/9/2022  11. Visit on 4/18/2022 for abdominal complaints. Ordered  abdominal/pelvic CT scan but not done. Amylase and Lipase normal.   12. Colonoscopy; he had negative FIT testing 11/18/2022 and declines all colonoscopy for colon cancer screening.   13. SOB/THOMAS; he had resting echo 4/4/2022. CXR 5/9/2022 (clear) and Latisha scan (ordered 5/9/2022, no scheduled yet). He may benefit from PFT's. Also given smoking history may qualify for CT screen for lung cancer  14. HTN; ordered 24 hour BP monitor today.           30 minutes spent on the date of the encounter doing chart review, history and exam, documentation and further activities as noted above exclusive of procedures and other billable interpretations

## 2022-08-17 ENCOUNTER — OFFICE VISIT (OUTPATIENT)
Dept: INTERNAL MEDICINE | Facility: CLINIC | Age: 57
End: 2022-08-17
Payer: COMMERCIAL

## 2022-08-17 VITALS
SYSTOLIC BLOOD PRESSURE: 150 MMHG | BODY MASS INDEX: 25.07 KG/M2 | DIASTOLIC BLOOD PRESSURE: 102 MMHG | WEIGHT: 190 LBS | OXYGEN SATURATION: 94 % | HEART RATE: 72 BPM

## 2022-08-17 DIAGNOSIS — I10 HYPERTENSION, UNSPECIFIED TYPE: Primary | ICD-10-CM

## 2022-08-17 DIAGNOSIS — Z72.0 TOBACCO ABUSE: ICD-10-CM

## 2022-08-17 DIAGNOSIS — R20.0 HAND NUMBNESS: ICD-10-CM

## 2022-08-17 PROCEDURE — 99214 OFFICE O/P EST MOD 30 MIN: CPT | Performed by: INTERNAL MEDICINE

## 2022-08-17 ASSESSMENT — PAIN SCALES - GENERAL: PAINLEVEL: NO PAIN (0)

## 2022-08-17 NOTE — NURSING NOTE
Chief Complaint   Patient presents with     Recheck Medication     Pt here to follow up       Dev Elmore CMA, EMT at 9:10 AM on 8/17/2022.

## 2022-08-19 ENCOUNTER — HOSPITAL ENCOUNTER (OUTPATIENT)
Dept: MRI IMAGING | Facility: CLINIC | Age: 57
Discharge: HOME | End: 2022-08-19
Attending: INTERNAL MEDICINE
Payer: COMMERCIAL

## 2022-08-19 DIAGNOSIS — I10 HYPERTENSION, UNSPECIFIED TYPE: ICD-10-CM

## 2022-08-19 DIAGNOSIS — Z72.0 TOBACCO ABUSE: ICD-10-CM

## 2022-08-19 DIAGNOSIS — R20.0 HAND NUMBNESS: ICD-10-CM

## 2022-08-19 PROCEDURE — 72141 MRI NECK SPINE W/O DYE: CPT | Mod: 26 | Performed by: RADIOLOGY

## 2022-08-19 PROCEDURE — 72141 MRI NECK SPINE W/O DYE: CPT

## 2022-08-20 ENCOUNTER — TELEPHONE (OUTPATIENT)
Dept: INTERNAL MEDICINE | Facility: CLINIC | Age: 57
End: 2022-08-20

## 2022-08-20 DIAGNOSIS — R20.0 ARM NUMBNESS: Primary | ICD-10-CM

## 2022-08-20 NOTE — TELEPHONE ENCOUNTER
Dear Mt;    Please see results letter I sent to Mr. Barnes. I placed referral for him to see Dr. Sullivan. Please help coordinate    Thanks, BHANU Bagley      Dear Mr. Macias;    The results of your cervical MRI are attached and I recommend  you be evaluated by Dr. Sullivan, orthopedic spine. I placed a referral today and our nursing staff will reach out to you. You can call [phone] to schedule an appointment with Dr. Sullivan. Also recommend you keep your 10/5/2022 with me to go over all your results.     BHANU Bagley MD

## 2022-08-22 NOTE — TELEPHONE ENCOUNTER
RN called and spoke with patient and scheduled patient to see Dr. Sullivan for cervical issues. Referred by Dr. Bagley.  Patient agreed to the plan.    Fawn Banuelos RN

## 2022-08-23 DIAGNOSIS — M54.2 NECK PAIN: Primary | ICD-10-CM

## 2022-08-23 NOTE — TELEPHONE ENCOUNTER
DIAGNOSIS: cervical radic   APPOINTMENT DATE: 8/30/22   NOTES STATUS DETAILS   OFFICE NOTE from referring provider Internal Ov 8/17/22  Phone note/referral 8/20/22- [facility]   LABS     CBC/DIFF Internal CBC 2/9/22   MRI Internal MR Cervical Spine done 8/19/22     
Detail Level: Detailed
Detail Level: Zone

## 2022-08-29 NOTE — PROGRESS NOTES
Spine Surgery Consultation    REFERRING PHYSICIAN: No ref. provider found   PRIMARY CARE PHYSICIAN: Abram Bagley           Chief Complaint:   Consult (referred by Dr. Bagley. cervical radic. MRI in pacs.)      History of Present Illness:  Symptom Profile Including: location of symptoms, onset, severity, exacerbating/alleviating factors, previous treatments:        Salas Macias is a 57 year old male who presents as a referral from Dr. Bagley for evaluation of cervical radiculopathy.  The patient reports that he has experienced 1 year worsening neck pain and bilateral upper extremity numbness and tingling left greater than right.  The patient denies shooting pains into his upper extremities.  He notes that he has been having difficulty writing, zipping his pants, buttoning his shirt.  He also notes that he has had increasingly difficulty with balance over the past several months.  The patient denies taking any anti-inflammatory medications or gabapentin.  He has not had dedicated physical therapy for the neck.  He has no prior history of injections in the neck.           Past Medical History:     Past Medical History:   Diagnosis Date     Depression      Paranoid schizophrenia (H)             Past Surgical History:   No past surgical history on file.         Social History:     Social History     Tobacco Use     Smoking status: Current Every Day Smoker     Last attempt to quit: 2022     Years since quittin.5     Smokeless tobacco: Never Used   Substance Use Topics     Alcohol use: No            Family History:     Family History   Problem Relation Age of Onset     Unknown/Adopted Mother         Family history unknown     Hypertension Mother      Unknown/Adopted Father      Hypertension Sister      Diabetes No family hx of      Glaucoma No family hx of      Macular Degeneration No family hx of             Allergies:   No Known Allergies         Medications:     Current Outpatient Medications    Medication     ALPRAZolam (XANAX) 1 MG tablet     aspirin (ASA) 81 MG chewable tablet     atorvastatin (LIPITOR) 20 MG tablet     FLUoxetine (PROZAC) 20 MG capsule     FLUoxetine (PROZAC) 40 MG capsule     nicotine (NICODERM CQ) 14 MG/24HR 24 hr patch     nicotine (NICODERM CQ) 21 MG/24HR 24 hr patch     nicotine (NICODERM CQ) 7 MG/24HR 24 hr patch     nicotine polacrilex (NICORETTE) 4 MG gum     OLANZapine (ZYPREXA) 20 MG tablet     sildenafil (VIAGRA) 50 MG tablet     varenicline (CHANTIX) 0.5 MG tablet     varenicline (CHANTIX) 1 MG tablet     vitamin D3 (CHOLECALCIFEROL) 50 mcg (2000 units) tablet     No current facility-administered medications for this visit.             Review of Systems:     A 10 point ROS was performed and reviewed. Specific responses to these questions are noted at the end of the document.         Physical Exam:   Vitals: There were no vitals taken for this visit.  Constitutional: awake, alert, cooperative, no apparent distress, appears stated age.    Eyes: The sclera are white.  Ears, Nose, Throat: The trachea is midline.  Psychiatric: The patient has a normal affect.  Respiratory: breathing non-labored  Cardiovascular: The extremities are warm and perfused.  Skin: no obvious rashes or lesions.  Musculoskeletal, Neurologic, and Spine:     Cervical spine:    Appearance -no gross step-offs, kyphosis.    Motor -     C5: Deltoids R 4/5 and L 4/5 strength    C6: Biceps R 5/5 and L 5/5 strength     C7: Triceps R 5/5 and L 5/5 strength     C8:  R 5/5 and L 5/5 strength     T1: Dorsal interossei R 4/5 and L 4/5 strength        Sensation:diminished sensation in C5-6 dermatome       Special Tests -      Spurling's Test - Negative      Mensah's Test - Positive      Unable to perform tandem gait          Neurologic:      REFLEXES Right Left   Biceps 2+ 2+   Triceps 3+ 3+   Brachioradialis 2+ 2+   Patella 3+ 3+   Ankle jerk 2+ 2+   Babinski No upgoing great toe No upgoing great toe   Clonus 0  beats 0 beats            Imaging:   We ordered and independently reviewed new radiographs at this clinic visit. The results were discussed with the patient.  Findings include:    MRI 8/19/22   1. Borderline congenital narrow bony spinal canal with superimposed  degenerative changes resulting in severe spinal canal stenosis at  C3-4, and C4-5. Moderate spinal canal stenosis at C5-6 and mild to  moderate at C6-7.  2. Question myelomalacic T2 hyperintense cord signal at C4 level.           Assessment and Plan:   Assessment:  57 year old male with significant C3-C6 spinal cord compression and symptoms of cervical myelopathy.  Reviewed the MRI findings with the patient, and noted that the severe pressure on the cord was likely the cause of his symptoms.  Explained that nonoperative management is not suitable for this type and degree of spinal cord compression, and if no intervention is taken it is likely to worsen.  Trials of physical therapy and spinal injections are indicated in this patient as they would unnecessarily prolong the time to surgical intervention.  Lack of timely and timely intervention could also lead to progressive nerve damage that is not reversible.  At this time would recommend posterior laminoplasty to the patient given the severity of his imaging findings as well as exam findings.  Discussed that the goal of surgery is to protect the spinal cord and prevent further damage.  Discussed the risk of infection, dural tear, and neurologic event.  Had a conversation with the patient that the likelihood of a neurologic injury during surgery is likely less than the no surgery were to be done given the likelihood that his cord compression would progress.  Patient wishes to set up a follow-up appointment to discuss surgery further.  Of note he recently lost a family member due to a complication of a surgery patient is very apprehensive of having a surgery himself.  Recommend that patient follow-up next week  to discuss surgical intervention given the severity of his problem and the risk of progression.  The patient wishes to reach out to clinic and set up an appointment when he is ready to discuss surgery.     Plan:   -recommend follow up in 1 week to discuss/plan surgery   -Patient will call to schedule his follow-up appointment  -Cautioned patient that his symptoms are likely to progress and his follow-up appointment should not be delayed more than a few weeks    Joe Hyde MD   Orthopaedic resident, PGY-1    Respectfully,  Anirudh Sullivan MD  Spine Surgery  [facility]    Attending MD (Dr. Anirudh Sullivan) :  I reviewed and verified the history and physical exam of the patient and discussed the patient's management with the other clinical providers involved in this patient's care including any involved residents or physicians assistants. I reviewed the above note and agree with the documented findings and plan of care, which were communicated to the patient.      Anirudh Sullivan MD

## 2022-08-30 ENCOUNTER — ANCILLARY PROCEDURE (OUTPATIENT)
Dept: GENERAL RADIOLOGY | Facility: CLINIC | Age: 57
End: 2022-08-30
Attending: ORTHOPAEDIC SURGERY
Payer: COMMERCIAL

## 2022-08-30 ENCOUNTER — OFFICE VISIT (OUTPATIENT)
Dept: ORTHOPEDICS | Facility: CLINIC | Age: 57
End: 2022-08-30
Payer: COMMERCIAL

## 2022-08-30 ENCOUNTER — PRE VISIT (OUTPATIENT)
Dept: ORTHOPEDICS | Facility: CLINIC | Age: 57
End: 2022-08-30

## 2022-08-30 DIAGNOSIS — M54.2 NECK PAIN: Primary | ICD-10-CM

## 2022-08-30 DIAGNOSIS — M54.2 NECK PAIN: ICD-10-CM

## 2022-08-30 PROCEDURE — 99204 OFFICE O/P NEW MOD 45 MIN: CPT | Mod: GC | Performed by: ORTHOPAEDIC SURGERY

## 2022-08-30 PROCEDURE — 72050 X-RAY EXAM NECK SPINE 4/5VWS: CPT | Mod: GC | Performed by: RADIOLOGY

## 2022-08-30 NOTE — NURSING NOTE
Reason For Visit:   Chief Complaint   Patient presents with     Consult     referred by Dr. Bagley. cervical radic. MRI in pacs.       Primary MD: Abram Bagley  Ref. MD: Kevyn     ?  No  Occupation Disability   Currently working? No.  Work status?  On disability.  No previous surgreys on back or neck   Smoker: Yes, 1 pack a day   Request smoking cessation information: No     There were no vitals taken for this visit.    Pain Assessment  Patient Currently in Pain: Yes  Patient's Stated Pain Goal: 5    Oswestry (JANY) Questionnaire    No flowsheet data found.         Neck Disability Index (NDI) Questionnaire    No flowsheet data found.                Promis 10 Assessment    No flowsheet data found.             Sue Santos

## 2022-08-30 NOTE — LETTER
2022         RE: Salas Macias  [address] [address]        Dear Colleague,    Thank you for referring your patient, Salas Macias, to the [facility] [place]. Please see a copy of my visit note below.    Spine Surgery Consultation    REFERRING PHYSICIAN: No ref. provider found   PRIMARY CARE PHYSICIAN: Abram Bagley           Chief Complaint:   Consult (referred by Dr. Bagley. cervical radic. MRI in pacs.)      History of Present Illness:  Symptom Profile Including: location of symptoms, onset, severity, exacerbating/alleviating factors, previous treatments:        Salas Macias is a 57 year old male who presents as a referral from Dr. Bagley for evaluation of cervical radiculopathy.  The patient reports that he has experienced 1 year worsening neck pain and bilateral upper extremity numbness and tingling left greater than right.  The patient denies shooting pains into his upper extremities.  He notes that he has been having difficulty writing, zipping his pants, buttoning his shirt.  He also notes that he has had increasingly difficulty with balance over the past several months.  The patient denies taking any anti-inflammatory medications or gabapentin.  He has not had dedicated physical therapy for the neck.  He has no prior history of injections in the neck.           Past Medical History:     Past Medical History:   Diagnosis Date     Depression      Paranoid schizophrenia (H)             Past Surgical History:   No past surgical history on file.         Social History:     Social History     Tobacco Use     Smoking status: Current Every Day Smoker     Last attempt to quit: 2022     Years since quittin.5     Smokeless tobacco: Never Used   Substance Use Topics     Alcohol use: No            Family History:     Family History   Problem Relation Age of Onset     Unknown/Adopted Mother         Family history unknown      Hypertension Mother      Unknown/Adopted Father      Hypertension Sister      Diabetes No family hx of      Glaucoma No family hx of      Macular Degeneration No family hx of             Allergies:   No Known Allergies         Medications:     Current Outpatient Medications   Medication     ALPRAZolam (XANAX) 1 MG tablet     aspirin (ASA) 81 MG chewable tablet     atorvastatin (LIPITOR) 20 MG tablet     FLUoxetine (PROZAC) 20 MG capsule     FLUoxetine (PROZAC) 40 MG capsule     nicotine (NICODERM CQ) 14 MG/24HR 24 hr patch     nicotine (NICODERM CQ) 21 MG/24HR 24 hr patch     nicotine (NICODERM CQ) 7 MG/24HR 24 hr patch     nicotine polacrilex (NICORETTE) 4 MG gum     OLANZapine (ZYPREXA) 20 MG tablet     sildenafil (VIAGRA) 50 MG tablet     varenicline (CHANTIX) 0.5 MG tablet     varenicline (CHANTIX) 1 MG tablet     vitamin D3 (CHOLECALCIFEROL) 50 mcg (2000 units) tablet     No current facility-administered medications for this visit.             Review of Systems:     A 10 point ROS was performed and reviewed. Specific responses to these questions are noted at the end of the document.         Physical Exam:   Vitals: There were no vitals taken for this visit.  Constitutional: awake, alert, cooperative, no apparent distress, appears stated age.    Eyes: The sclera are white.  Ears, Nose, Throat: The trachea is midline.  Psychiatric: The patient has a normal affect.  Respiratory: breathing non-labored  Cardiovascular: The extremities are warm and perfused.  Skin: no obvious rashes or lesions.  Musculoskeletal, Neurologic, and Spine:     Cervical spine:    Appearance -no gross step-offs, kyphosis.    Motor -     C5: Deltoids R 4/5 and L 4/5 strength    C6: Biceps R 5/5 and L 5/5 strength     C7: Triceps R 5/5 and L 5/5 strength     C8:  R 5/5 and L 5/5 strength     T1: Dorsal interossei R 4/5 and L 4/5 strength        Sensation:diminished sensation in C5-6 dermatome       Special Tests -      Spurling's Test -  Negative      Mensah's Test - Positive      Unable to perform tandem gait          Neurologic:      REFLEXES Right Left   Biceps 2+ 2+   Triceps 3+ 3+   Brachioradialis 2+ 2+   Patella 3+ 3+   Ankle jerk 2+ 2+   Babinski No upgoing great toe No upgoing great toe   Clonus 0 beats 0 beats            Imaging:   We ordered and independently reviewed new radiographs at this clinic visit. The results were discussed with the patient.  Findings include:    MRI 8/19/22   1. Borderline congenital narrow bony spinal canal with superimposed  degenerative changes resulting in severe spinal canal stenosis at  C3-4, and C4-5. Moderate spinal canal stenosis at C5-6 and mild to  moderate at C6-7.  2. Question myelomalacic T2 hyperintense cord signal at C4 level.           Assessment and Plan:   Assessment:  57 year old male with significant C3-C6 spinal cord compression and symptoms of cervical myelopathy.  Reviewed the MRI findings with the patient, and noted that the severe pressure on the cord was likely the cause of his symptoms.  Explained that nonoperative management is not suitable for this type and degree of spinal cord compression, and if no intervention is taken it is likely to worsen.  Trials of physical therapy and spinal injections are indicated in this patient as they would unnecessarily prolong the time to surgical intervention.  Lack of timely and timely intervention could also lead to progressive nerve damage that is not reversible.  At this time would recommend posterior laminoplasty to the patient given the severity of his imaging findings as well as exam findings.  Discussed that the goal of surgery is to protect the spinal cord and prevent further damage.  Discussed the risk of infection, dural tear, and neurologic event.  Had a conversation with the patient that the likelihood of a neurologic injury during surgery is likely less than the no surgery were to be done given the likelihood that his cord compression  would progress.  Patient wishes to set up a follow-up appointment to discuss surgery further.  Of note he recently lost a family member due to a complication of a surgery patient is very apprehensive of having a surgery himself.  Recommend that patient follow-up next week to discuss surgical intervention given the severity of his problem and the risk of progression.  The patient wishes to reach out to clinic and set up an appointment when he is ready to discuss surgery.     Plan:   -recommend follow up in 1 week to discuss/plan surgery   -Patient will call to schedule his follow-up appointment  -Cautioned patient that his symptoms are likely to progress and his follow-up appointment should not be delayed more than a few weeks    Joe Hyde MD   Orthopaedic resident, PGY-1    Respectfully,  Anirudh Sullivan MD  Spine Surgery  [facility]    Attending MD (Dr. Anirudh Sullivan) :  I reviewed and verified the history and physical exam of the patient and discussed the patient's management with the other clinical providers involved in this patient's care including any involved residents or physicians assistants. I reviewed the above note and agree with the documented findings and plan of care, which were communicated to the patient.      Anirudh Sullivan MD

## 2022-09-12 ENCOUNTER — OFFICE VISIT (OUTPATIENT)
Dept: ORTHOPEDICS | Facility: CLINIC | Age: 57
End: 2022-09-12
Payer: COMMERCIAL

## 2022-09-12 ENCOUNTER — HOSPITAL ENCOUNTER (INPATIENT)
Facility: CLINIC | Age: 57
Setting detail: SURGERY ADMIT
End: 2022-09-12
Attending: ORTHOPAEDIC SURGERY | Admitting: ORTHOPAEDIC SURGERY
Payer: COMMERCIAL

## 2022-09-12 ENCOUNTER — TELEPHONE (OUTPATIENT)
Dept: ORTHOPEDICS | Facility: CLINIC | Age: 57
End: 2022-09-12

## 2022-09-12 DIAGNOSIS — G95.9 CERVICAL MYELOPATHY (H): Primary | ICD-10-CM

## 2022-09-12 PROCEDURE — 99214 OFFICE O/P EST MOD 30 MIN: CPT | Performed by: ORTHOPAEDIC SURGERY

## 2022-09-12 RX ORDER — GABAPENTIN 300 MG/1
300 CAPSULE ORAL
Status: CANCELLED | OUTPATIENT
Start: 2022-09-12

## 2022-09-12 RX ORDER — CEFAZOLIN SODIUM/WATER 2 G/20 ML
2 SYRINGE (ML) INTRAVENOUS
Status: CANCELLED | OUTPATIENT
Start: 2022-09-12

## 2022-09-12 RX ORDER — CEFAZOLIN SODIUM/WATER 2 G/20 ML
2 SYRINGE (ML) INTRAVENOUS SEE ADMIN INSTRUCTIONS
Status: CANCELLED | OUTPATIENT
Start: 2022-09-12

## 2022-09-12 RX ORDER — ACETAMINOPHEN 325 MG/1
975 TABLET ORAL ONCE
Status: CANCELLED | OUTPATIENT
Start: 2022-09-12 | End: 2022-09-12

## 2022-09-12 NOTE — NURSING NOTE
Teaching Flowsheet   Relevant Diagnosis: C2- C7 Dome Laminectomies and C3-C6 Laminoplasties  Teaching Topic: Pre op and post surgery     Person(s) involved in teaching:   Patient     Motivation Level:  Asks Questions: Yes  Eager to Learn: Yes  Cooperative: Yes  Receptive (willing/able to accept information): Yes  Any cultural factors/Mormonism beliefs that may influence understanding or compliance? No       Patient demonstrates understanding of the following:  Reason for the appointment, diagnosis and treatment plan: Yes  Knowledge of proper use of medications and conditions for which they are ordered (with special attention to potential side effects or drug interactions): Yes  Which situations necessitate calling provider and whom to contact: Yes       Teaching Concerns Addressed: RN discussed all aspects of pre and post surgery with patient. Lara will call patient with surgery date, PAC appt and Covid 19 testing.       Proper use and care of med (medical equip, care aids, etc.): Yes  Nutritional needs and diet plan: Yes  Pain management techniques: Yes  Wound Care: Yes  How and/when to access community resources: Yes     Instructional Materials Used/Given: Pre op packet and antibacterial soap     Time spent with patient: 15 minutes.

## 2022-09-12 NOTE — LETTER
2022         RE: Salas Macias  [address] [address]        Dear Colleague,    Thank you for referring your patient, Salas Macias, to the [facility] [place]. Please see a copy of my visit note below.    Spine Surgery Return Clinic Visit      Chief Complaint:   RECHECK (Discuss scheduling surgery )      Interval HPI:  Symptom Profile Including: location of symptoms, onset, severity, exacerbating/alleviating factors, previous treatments:        Salas Macias is a 57 year old male who returns today with a number of appropriate questions about surgery.  His history is unchanged from the previous visit.  Today's visit was spent in imaging review and counseling about surgery and surgical decision making.    Salas aMcias is a 57 year old male who presents as a referral from Dr. Bagley for evaluation of cervical radiculopathy.  The patient reports that he has experienced 1 year worsening neck pain and bilateral upper extremity numbness and tingling left greater than right.  The patient denies shooting pains into his upper extremities.  He notes that he has been having difficulty writing, zipping his pants, buttoning his shirt.  He also notes that he has had increasingly difficulty with balance over the past several months.  The patient denies taking any anti-inflammatory medications or gabapentin.  He has not had dedicated physical therapy for the neck.  He has no prior history of injections in the neck.             Past Medical History:      Past Medical History        Past Medical History:   Diagnosis Date     Depression       Paranoid schizophrenia (H)                   Past Surgical History:   Past Surgical History   No past surgical history on file.             Social History:      Social History            Tobacco Use     Smoking status: Current Every Day Smoker       Last attempt to quit: 2022       Years since quittin.5      Smokeless tobacco: Never Used   Substance Use Topics     Alcohol use: No              Family History:      Family History         Family History   Problem Relation Age of Onset     Unknown/Adopted Mother           Family history unknown     Hypertension Mother       Unknown/Adopted Father       Hypertension Sister       Diabetes No family hx of       Glaucoma No family hx of       Macular Degeneration No family hx of                   Allergies:   No Known Allergies          Medications:          Current Outpatient Medications   Medication     ALPRAZolam (XANAX) 1 MG tablet     aspirin (ASA) 81 MG chewable tablet     atorvastatin (LIPITOR) 20 MG tablet     FLUoxetine (PROZAC) 20 MG capsule     FLUoxetine (PROZAC) 40 MG capsule     nicotine (NICODERM CQ) 14 MG/24HR 24 hr patch     nicotine (NICODERM CQ) 21 MG/24HR 24 hr patch     nicotine (NICODERM CQ) 7 MG/24HR 24 hr patch     nicotine polacrilex (NICORETTE) 4 MG gum     OLANZapine (ZYPREXA) 20 MG tablet     sildenafil (VIAGRA) 50 MG tablet     varenicline (CHANTIX) 0.5 MG tablet     varenicline (CHANTIX) 1 MG tablet     vitamin D3 (CHOLECALCIFEROL) 50 mcg (2000 units) tablet      No current facility-administered medications for this visit.              Review of Systems:      A 10 point ROS was performed and reviewed. Specific responses to these questions are noted at the end of the document.          Physical Exam:   Vitals: There were no vitals taken for this visit.  Constitutional: awake, alert, cooperative, no apparent distress, appears stated age.    Eyes: The sclera are white.  Ears, Nose, Throat: The trachea is midline.  Psychiatric: The patient has a normal affect.  Respiratory: breathing non-labored  Cardiovascular: The extremities are warm and perfused.  Skin: no obvious rashes or lesions.  Musculoskeletal, Neurologic, and Spine:      Cervical spine:                              Appearance -no gross step-offs, kyphosis.                              Motor  -                               C5: Deltoids R 4/5 and L 4/5 strength                              C6: Biceps R 5/5 and L 5/5 strength                               C7: Triceps R 5/5 and L 5/5 strength                               C8:  R 5/5 and L 5/5 strength                               T1: Dorsal interossei R 4/5 and L 4/5 strength                                   Sensation:diminished sensation in C5-6 dermatome                                  Special Tests -                                             Spurling's Test - Negative                                             Mensah's Test - Positive                                             Unable to perform tandem gait                                                                                        Neurologic:                                REFLEXES Right Left   Biceps 2+ 2+   Triceps 3+ 3+   Brachioradialis 2+ 2+   Patella 3+ 3+   Ankle jerk 2+ 2+   Babinski No upgoing great toe No upgoing great toe   Clonus 0 beats 0 beats              Imaging:   We ordered and independently reviewed new radiographs at this clinic visit. The results were discussed with the patient.  Findings include:     MRI 8/19/22   1. Borderline congenital narrow bony spinal canal with superimposed  degenerative changes resulting in severe spinal canal stenosis at  C3-4, and C4-5. Moderate spinal canal stenosis at C5-6 and mild to  moderate at C6-7.  2. Question myelomalacic T2 hyperintense cord signal at C4 level.       Radiographs show preserved alignment             Assessment and Plan:   Assessment:  57 year old male with significant C3-C6 spinal cord compression and symptoms of cervical myelopathy.     We again went over the findings which include Preserved alignment multilevel severe spondylosis and stenosis with T2 hyperintense signal change.  I think the best way to address this for him would be to do a posterior laminoplasty.  This is a motion preserving option, and I  think would help get his cord out of danger.  He is not having clear radicular complaints at this time and the main concern are the myelopathic findings.  The patient does note that symptoms have been worsening recently, even in the last few months.  He expressed some hesitancy about surgery, and has had some negative reports from others, but is certainly concerned about his declining neurologic function.  We discussed that the main goal of surgery for myelopathy is to prevent neurologic worsening and I cannot guarantee things would improve, but the goal is to keep things from getting worse.  We also specifically discussed C5 nerve palsy in addition to the other risks noted below.    Risks of this surgery include risk of infection, risk of dural tear resulting in CSF leak which might result in headaches, or possible need for lumbar drain, or possible revision surgery in the setting of a persistent leak. Risk of hematoma or seroma resulting in wound complications.  Possible nerve root injury resulting in numbness weakness or paralysis into the arms or legs. Possible radiculitis which could result in similar symptoms or could result in significant neurogenic type pain. There is also a risk of delayed onset nerve root palsy, which I explained is potentially due to stretch injury from the dorsal decompression, and is sometimes temporary but can also be permanent.  Risk of incomplete decompression which might require revision surgery in the future.  Risk of adjacent segment problems requiring surgery in the future. Risk of incomplete relief of symptoms possibly requiring revision surgery in the future. Furthermore, although rare, there are risks of major vessel injury such as to the vertebral artery or major organ injury such as to the esophagus or trachea from the surgery.  There are risks of dysphagia or dysphonia which can make it hard to swallow or talk.  Lastly, although rare, there are certainly risks of the  anesthetic including stroke heart attack and death.      We talked about the 6 to 12-week recovery and he understands and wishes to proceed.  We will move forward with getting things scheduled.  I spent about 30 minutes in imaging review, meeting with the patient counseling and in note preparation          Respectfully,  Anirudh Sullivan MD  Spine Surgery  [facility]      Reason For Visit:   Chief Complaint   Patient presents with     RECHECK     Discuss scheduling surgery            Primary MD: Abram Bagley  Ref. MD: Kevyn      ?  No  Occupation Disability   Currently working? No.  Work status?  On disability.  No previous surgreys on back or neck   Smoker: Yes, 1 pack a day   Request smoking cessation information: No     There were no vitals taken for this visit.       Oswestry (JANY) Questionnaire    No flowsheet data found.       Neck Disability Index (NDI) Questionnaire    No flowsheet data found.     Promis 10 Assessment    No flowsheet data found.     Nuria Browne, ATC

## 2022-09-12 NOTE — TELEPHONE ENCOUNTER
Patient is scheduled for surgery with Dr. Sullivan    Spoke with: Patient    Date of Surgery: 9/23/22    Location: [place]    Post op: 6 weeks    Pre op with Provider: Complete    H&P: Scheduled with PAC 9/16/22    Pre-procedure COVID-19 Test: 9/19/22 - Dk    Additional imaging/appointments: N/A    Surgery packet: Received in clinic     Additional comments: N/A

## 2022-09-12 NOTE — PROGRESS NOTES
Spine Surgery Return Clinic Visit      Chief Complaint:   RECHECK (Discuss scheduling surgery )      Interval HPI:  Symptom Profile Including: location of symptoms, onset, severity, exacerbating/alleviating factors, previous treatments:        Salas Macias is a 57 year old male who returns today with a number of appropriate questions about surgery.  His history is unchanged from the previous visit.  Today's visit was spent in imaging review and counseling about surgery and surgical decision making.    Salas Macias is a 57 year old male who presents as a referral from Dr. Bagley for evaluation of cervical radiculopathy.  The patient reports that he has experienced 1 year worsening neck pain and bilateral upper extremity numbness and tingling left greater than right.  The patient denies shooting pains into his upper extremities.  He notes that he has been having difficulty writing, zipping his pants, buttoning his shirt.  He also notes that he has had increasingly difficulty with balance over the past several months.  The patient denies taking any anti-inflammatory medications or gabapentin.  He has not had dedicated physical therapy for the neck.  He has no prior history of injections in the neck.             Past Medical History:      Past Medical History        Past Medical History:   Diagnosis Date     Depression       Paranoid schizophrenia (H)                   Past Surgical History:   Past Surgical History   No past surgical history on file.             Social History:      Social History      Tobacco Use     Smoking status: Current Every Day Smoker       Last attempt to quit: 2022       Years since quittin.5     Smokeless tobacco: Never Used   Substance Use Topics     Alcohol use: No              Family History:      Family History         Family History   Problem Relation Age of Onset     Unknown/Adopted Mother           Family history unknown     Hypertension Mother       Unknown/Adopted  Father       Hypertension Sister       Diabetes No family hx of       Glaucoma No family hx of       Macular Degeneration No family hx of                   Allergies:   No Known Allergies          Medications:          Current Outpatient Medications   Medication     ALPRAZolam (XANAX) 1 MG tablet     aspirin (ASA) 81 MG chewable tablet     atorvastatin (LIPITOR) 20 MG tablet     FLUoxetine (PROZAC) 20 MG capsule     FLUoxetine (PROZAC) 40 MG capsule     nicotine (NICODERM CQ) 14 MG/24HR 24 hr patch     nicotine (NICODERM CQ) 21 MG/24HR 24 hr patch     nicotine (NICODERM CQ) 7 MG/24HR 24 hr patch     nicotine polacrilex (NICORETTE) 4 MG gum     OLANZapine (ZYPREXA) 20 MG tablet     sildenafil (VIAGRA) 50 MG tablet     varenicline (CHANTIX) 0.5 MG tablet     varenicline (CHANTIX) 1 MG tablet     vitamin D3 (CHOLECALCIFEROL) 50 mcg (2000 units) tablet      No current facility-administered medications for this visit.              Review of Systems:      A 10 point ROS was performed and reviewed. Specific responses to these questions are noted at the end of the document.          Physical Exam:   Vitals: There were no vitals taken for this visit.  Constitutional: awake, alert, cooperative, no apparent distress, appears stated age.    Eyes: The sclera are white.  Ears, Nose, Throat: The trachea is midline.  Psychiatric: The patient has a normal affect.  Respiratory: breathing non-labored  Cardiovascular: The extremities are warm and perfused.  Skin: no obvious rashes or lesions.  Musculoskeletal, Neurologic, and Spine:      Cervical spine:                              Appearance -no gross step-offs, kyphosis.                              Motor -                               C5: Deltoids R 4/5 and L 4/5 strength                              C6: Biceps R 5/5 and L 5/5 strength                               C7: Triceps R 5/5 and L 5/5 strength                               C8:  R 5/5 and L 5/5 strength                                T1: Dorsal interossei R 4/5 and L 4/5 strength                                   Sensation:diminished sensation in C5-6 dermatome                                  Special Tests -                                             Spurling's Test - Negative                                             Mensah's Test - Positive                                             Unable to perform tandem gait                                                                                        Neurologic:                                REFLEXES Right Left   Biceps 2+ 2+   Triceps 3+ 3+   Brachioradialis 2+ 2+   Patella 3+ 3+   Ankle jerk 2+ 2+   Babinski No upgoing great toe No upgoing great toe   Clonus 0 beats 0 beats              Imaging:   We ordered and independently reviewed new radiographs at this clinic visit. The results were discussed with the patient.  Findings include:     MRI 8/19/22   1. Borderline congenital narrow bony spinal canal with superimposed  degenerative changes resulting in severe spinal canal stenosis at  C3-4, and C4-5. Moderate spinal canal stenosis at C5-6 and mild to  moderate at C6-7.  2. Question myelomalacic T2 hyperintense cord signal at C4 level.       Radiographs show preserved alignment             Assessment and Plan:   Assessment:  57 year old male with significant C3-C6 spinal cord compression and symptoms of cervical myelopathy.     We again went over the findings which include Preserved alignment multilevel severe spondylosis and stenosis with T2 hyperintense signal change.  I think the best way to address this for him would be to do a posterior laminoplasty.  This is a motion preserving option, and I think would help get his cord out of danger.  He is not having clear radicular complaints at this time and the main concern are the myelopathic findings.  The patient does note that symptoms have been worsening recently, even in the last few months.  He expressed some hesitancy  about surgery, and has had some negative reports from others, but is certainly concerned about his declining neurologic function.  We discussed that the main goal of surgery for myelopathy is to prevent neurologic worsening and I cannot guarantee things would improve, but the goal is to keep things from getting worse.  We also specifically discussed C5 nerve palsy in addition to the other risks noted below.    Risks of this surgery include risk of infection, risk of dural tear resulting in CSF leak which might result in headaches, or possible need for lumbar drain, or possible revision surgery in the setting of a persistent leak. Risk of hematoma or seroma resulting in wound complications.  Possible nerve root injury resulting in numbness weakness or paralysis into the arms or legs. Possible radiculitis which could result in similar symptoms or could result in significant neurogenic type pain. There is also a risk of delayed onset nerve root palsy, which I explained is potentially due to stretch injury from the dorsal decompression, and is sometimes temporary but can also be permanent.  Risk of incomplete decompression which might require revision surgery in the future.  Risk of adjacent segment problems requiring surgery in the future. Risk of incomplete relief of symptoms possibly requiring revision surgery in the future. Furthermore, although rare, there are risks of major vessel injury such as to the vertebral artery or major organ injury such as to the esophagus or trachea from the surgery.  There are risks of dysphagia or dysphonia which can make it hard to swallow or talk.  Lastly, although rare, there are certainly risks of the anesthetic including stroke heart attack and death.      We talked about the 6 to 12-week recovery and he understands and wishes to proceed.  We will move forward with getting things scheduled.  I spent about 30 minutes in imaging review, meeting with the patient counseling and in  note preparation          Respectfully,  Anirudh Sullivan MD  Spine Surgery  [facility]      Reason For Visit:   Chief Complaint   Patient presents with     RECHECK     Discuss scheduling surgery            Primary MD: Abram Bagley  Ref. MD: Kevyn      ?  No  Occupation Disability   Currently working? No.  Work status?  On disability.  No previous surgreys on back or neck   Smoker: Yes, 1 pack a day   Request smoking cessation information: No     There were no vitals taken for this visit.         Oswestry (JANY) Questionnaire    No flowsheet data found.         Neck Disability Index (NDI) Questionnaire    No flowsheet data found.                Promis 10 Assessment    No flowsheet data found.             Nuria Browne, ATC

## 2022-09-13 ENCOUNTER — TELEPHONE (OUTPATIENT)
Dept: ORTHOPEDICS | Facility: CLINIC | Age: 57
End: 2022-09-13

## 2022-09-13 NOTE — TELEPHONE ENCOUNTER
RN called and spoke with patient and provide instruction how to scan the QR code that was in the pre op packet.  Patient did over the phone and it was successful.  RN encouraged patient to reach out if he has other questions or concerns. Patient expressed understanding.      Fawn Banuelos RN        [facility]    Phone Message    May a detailed message be left on voicemail: yes     Reason for Call Patient stated He need Help with a Bar Code ??? He didn't do good Job explaining what He is talking about. He want a call back for help.   Action Taken: Message routed to:  [facility] ([facility]    Travel Screening: Not Applicable

## 2022-09-13 NOTE — TELEPHONE ENCOUNTER
FUTURE VISIT INFORMATION      SURGERY INFORMATION:    Date: 9/23/22    Location: ur or    Surgeon:  Anirudh Sullivan MD    Anesthesia Type:  General    Procedure: Cervical 2 and 7 dome laminectomies and 3-6 laminoplasty with medtronic instrumentation    Consult: ov 9/12/22    RECORDS REQUESTED FROM:       Primary Care Provider: Abram Bagley MD- [facility]    Most recent EKG+ Tracing: 3/11/21    Most recent ECHO: 4/4/22    Most recent Cardiac Stress Test: 5/9/22

## 2022-09-16 ENCOUNTER — PRE VISIT (OUTPATIENT)
Dept: SURGERY | Facility: CLINIC | Age: 57
End: 2022-09-16

## 2022-09-16 NOTE — TELEPHONE ENCOUNTER
FUTURE VISIT INFORMATION      FUTURE VISIT INFORMATION:    Date: 12.14.20    Time: 2 PM    Location:  neuro  REFERRAL INFORMATION:    Referring provider:  Kevyn    Referring providers clinic:  [facility]    Reason for visit/diagnosis  hand numbness    RECORDS REQUESTED FROM:       Clinic name Comments Records Status Imaging Status   INternal 4.18.22- Dr. Bagley    8.30.22 XR cervical spine  8.19.22 MR cervical spine    CTA Head/Neck-11/3/2022    MR Brain-1/5/2022 Epic PACS   [facility] 3.17.22 CT head Care Everywhere Requested                                Action 9.16.22 MJ   Action Taken Requested image from [facility]     12/12/2022-Request for images faxed to [facility]-MR@ 648am    12/14/2022-2nd request for images faxed to [facility]-MR @ 444am   Records Requested     May 15, 2023 10:34 AM   [address] [place] [address]   Outcome 10:36am Sent request for imaging to be pushed to PACS. -YIMI Gong on 5/24/2023 at 8:29 AM Imaging resolved into PACS. -YIMI     Records Requested     May 15, 2023 10:37 AM   [address] [place] [address]   Outcome 10:40am Sent request for imaging to be pushed to PACS. -YIMI Gong on 5/24/2023 at 8:29 AM Imaging resolved into PACS. -YIMI     RECORDS RECEIVED FROM: Internal   REASON FOR VISIT: Hand numbness    Date of Appt: 5/26/23 10:30am   NOTES (FOR ALL VISITS) STATUS DETAILS   OFFICE NOTE from referring provider Internal 4/18/22, 8/17/22, 2/9/22  [facility]   OFFICE NOTE from other specialist Internal 9/21/22 Adelaide Coffey APRN CNP @ [facility]-Pre-op    9/12/22, 8/30/22 Anirudh Sullivna MD @ [facility]-Ortho    3/11/22 Abram Campbell [facility] @ [facility]Primary Care    3/9/22 Abram Marroquin MD @ [facility]EMG Clinic    10/26/21 Maynor Negron MD @ [facility]     DISCHARGE REPORT from the ER Care Everywhere 9/25/22 Yosef Casiano MD @ [facility]    3/17/22 Sandeep Gleason MD @ [facility]-ED    9/26/21 Edward David MD @ [facility]     OPERATIVE REPORT  Internal 10/14/22 Anirudh Sullivan MD @ [facility] Cervical 2 and 7 Dome Laminectomies and 3-6 Laminoplasty with Medtronic Instrumentation   MEDICATION LIST Internal    IMAGING  (FOR ALL VISITS)     X-RAY Internal [facility]  8/30/22 XR Cervical Spine  9/26/21 XR Shoulder Left  9/26/21 XR Hand Bilat   MRI (HEAD, NECK, SPINE) Internal [facility]  8/19/22 MR Cervical Spine   1/5/22 MR Brain     CT (HEAD, NECK, SPINE) PACS [facility]  11/3/22 CTA Head  9/26/21 CT Head    [facility]  9/25/22 CT Cervical Spine    [facility]  3/17/22 CT Head

## 2022-09-19 ENCOUNTER — LAB (OUTPATIENT)
Dept: LAB | Facility: CLINIC | Age: 57
End: 2022-09-19
Payer: COMMERCIAL

## 2022-09-19 DIAGNOSIS — Z20.822 ENCOUNTER FOR LABORATORY TESTING FOR COVID-19 VIRUS: ICD-10-CM

## 2022-09-19 PROCEDURE — U0003 INFECTIOUS AGENT DETECTION BY NUCLEIC ACID (DNA OR RNA); SEVERE ACUTE RESPIRATORY SYNDROME CORONAVIRUS 2 (SARS-COV-2) (CORONAVIRUS DISEASE [COVID-19]), AMPLIFIED PROBE TECHNIQUE, MAKING USE OF HIGH THROUGHPUT TECHNOLOGIES AS DESCRIBED BY CMS-2020-01-R: HCPCS

## 2022-09-19 PROCEDURE — U0005 INFEC AGEN DETEC AMPLI PROBE: HCPCS

## 2022-09-20 LAB — SARS-COV-2 RNA RESP QL NAA+PROBE: NEGATIVE

## 2022-09-20 RX ORDER — METHADONE HYDROCHLORIDE 5 MG/5ML
35 SOLUTION ORAL EVERY MORNING
COMMUNITY
End: 2023-09-20

## 2022-09-20 NOTE — PHARMACY - PREOPERATIVE ASSESSMENT CENTER
Preoperative Assessment Center Medication History Note    Medication history completed on September 20, 2022 by this writer. See Epic admission navigator for prior to admission medications. Operating room staff will still need to confirm medications and last dose information on day of surgery.     Medication history interview sources  Patient interview: Yes  Care Everywhere records: No  Surescripts pharmacy refill records: Yes    Changes made to PTA medication list  Added: methadone  Deleted: none  Changed: none    Additional medication history information (including reliability of information, actions taken by pharmacist):  -pt manages his own meds and is a reliable historian  -pt last filled atorvastatin 6/7/22 x30 days  -see separate note for methadone clinic details    -- No recent (within 30 days) course of antibiotics  -- No recent (within 30 days) course of systemic steroids  -- Reports not being on blood thinning medications - aspirin 81 mg daily, pt ran out of this in August, has not yet picked up another supply   -- Declines being on any other prescription or over-the-counter medications    Prior to Admission medications    Medication Sig Last Dose Taking? Auth Provider Long Term End Date   ALPRAZolam (XANAX) 1 MG tablet Take 1 mg by mouth 3 times daily Taking Yes Reported, Patient     FLUoxetine (PROZAC) 20 MG capsule Take 60 mg by mouth daily Taking Yes Reported, Patient Yes    FLUoxetine (PROZAC) 40 MG capsule Take 60 mg by mouth daily Taking Yes Reported, Patient Yes    methadone (DOLOPHINE) 5 MG/5ML solution Take 35 mg by mouth every morning Taking Yes Unknown, Entered By History     nicotine polacrilex (NICORETTE) 4 MG gum Chew one piece every 1-2 hours as directed. Taking Yes Abram Bagley MD     OLANZapine (ZYPREXA) 20 MG tablet TAKE 1/2 TABLET (10 mg) BY MOUTH TWICE DAILY, PLUS 1/2 DAILY (10 mg) AS NEEDED FOR AGITATION HALLUCINATIONS Taking Yes Reported, Patient Yes    aspirin (ASA) 81 MG  chewable tablet Take 1 tablet (81 mg) by mouth daily  Patient not taking: No sig reported Not Taking  Markos Young MD     atorvastatin (LIPITOR) 20 MG tablet Take 1 tablet (20 mg) by mouth daily  Patient not taking: No sig reported Not Taking  Markos Young MD Yes    sildenafil (VIAGRA) 50 MG tablet Take 1 tablet (50 mg) by mouth daily as needed (erectile dysfunction)  Patient not taking: No sig reported Not Taking  Jj Hale NP Yes    vitamin D3 (CHOLECALCIFEROL) 50 mcg (2000 units) tablet Take 1 tablet (50 mcg) by mouth daily  Patient not taking: Reported on 9/20/2022 Not Taking  Jj Hale NP          Medication history completed by:  Darin Adam, PharmD  [facility] Pharmacist  [phone]

## 2022-09-20 NOTE — PHARMACY - PREOPERATIVE ASSESSMENT CENTER
Methadone Clinic Information Note    Clinic Name: [facility]  Clinic Location (city): [facility]  Phone Number: [phone]  Prescriber's Name: Dr Steve Iyer  Methadone 35 mg oral soln daily  Last visit 9/20  Take-out doses: none except clinic closure days (holidays)    Darin Adam, PharmD  PAC Pharmacist  [phone]

## 2022-09-20 NOTE — PHARMACY - PREOPERATIVE ASSESSMENT CENTER
PREOPERATIVE PAIN CONSULT FOR POSTOPERATIVE PAIN MANAGEMENT  Salas Macias was interviewed via phone on September 20, 2022 prior to PAC Clinic appointment. Patient is preparing for the planned procedure with Dr. Sullivan on 9/23/22 at the [facility] for Cervical 2 and 7 dome laminectomies and 3-6 laminoplasty with medtronic instrumentation.  These recommendations are intended for patients admitted to the hospital after a procedure and are only valid for 30 days from the date of service. If there are significant changes in opioid dosing between today and day of procedure the below recommendations may have to be adjusted.      RECOMMENDATIONS:   The following pain management recommendations are made based on information from today's visit and should not replace medical decision-making based on patient condition at the time of procedure or postoperatively.      - PREOPERATIVE:  + Long acting opioid - methadone 35 mg daily. Take usual dose on the morning of procedure.  + Before surgery recommend gabapentin 300 mg PO x 1 dose in pre-op area (Written in pre-op by PAC FILI)   + Before procedure recommend acetaminophen 975 mg PO in pre-op (Written in pre-op by PAC FILI)  + Consider urine drug screen on day of procedure given patient's history of substance use.     - INTRAOPERATIVE (Anesthesiologist/CRNA to consider):   + Regional anesthesia - Defer to RAPS team   + AVOID Ketamine IV intraoperatively - pt has diagnosis of paranoid schizophrenia  + Avoid remifentanil - to reduce risk of developing hyperalgesia    - POSTOPERATIVE INPATIENT MANAGEMENT:  + Please consult the inpatient pain management service for postoperative pain management recommendations.     Opioid analgesic:  + Note: if neuraxial opioid or other long acting opioid (eg. Methadone) is given during procedure contact on-call pain provider for adjustment in opioid plan  + Note if regional approach includes an opioid via the epidural  then defer opioid management to RAPS team.   + If able to take oral medications (preferred):           -- continue PTA methadone 35 mg daily  (hold or reduce dose as needed if patient has s/sx sedation or respiratory depression)          -- oxycodone 5-10 mg PO q4h PRN           -- hydromorphone 0.2-0.4 mg IV q2h PRN breakthru pain    + If unable to take oral medications:          --HYDROmorphone (Dilaudid) PCA recommended dose range 0.2-0.3 mg Q 10 min lockout interval with NO continuous rate. Start with 0.2 mg PCA dose Q 10 min lockout interval. If necessary for pain control and improvement in physical function, notify provider for PCA dose increase orders per recommended dose range. Hold or reduce dose for sedation.          -- Hold other PO and IV opioids while on PCA, except continue PTA methadone    Nonopioid analgesics:   + Defer use of NSAID to surgeon  + Start acetaminophen 975 mg PO every 6 hr scheduled if no concern about masking fevers  + Start gabapentin 300 mg PO every 8 hours scheduled   + Resume prior to admission medications related to pain: none    Muscle Relaxant:   + Methocarbamol 750 mg PO Q6hr PRN muscle spasm OR  + If unable to tolerate PO meds, use Diazepam (Valium) 5 mg IV Q 6 hr PRN muscle spasm - note that pt takes alprazolam 1 mg TID at baseline, recommend to avoid multiple benzodiazepines    Stool softeners/Laxatives:   + When appropriate start senna-docusate 1-2 tabs PO BID and Miralax 17 g daily to prevent opioid induced constipation.     Other:  + Recommend close monitoring of respiratory status postoperatively with capnography and continuous SpO2 monitoring. Would recommend continuing capnography beyond the usual 24 hr due to patient's opioid tolerance.     -------------------------------------------------------------------------------------------------------------------  - OUTPATIENT MEDICATIONS (related to pain management):  -- Long-acting opioid: methadone 35 mg daily  --  "Short-acting opioid: none  -- Intrathecal pump: none  -- Oral adjuvant(s): none  -- Topicals: none  -- Bowel regimen: none   -- Other relevant medications: alprazolam 1 mg TID    Verbal consent was given by patient to access pharmacy records and [place] Prescription Monitoring Profile: Yes  Outpatient opioids prescribed by methadone clinic - Dr Steve Iyer, [facility], [facility]  Outpatient opioid oral morphine equivalent (OME): n/a    ASSESSMENT:    Salas Macias has a history of cervical radiculopathy and is preparing for above mentioned surgery.  He states his pain is located in his L neck, L arm, and R leg, and each location has different features. His L neck pain radiates into his bilateral arms L > R. His left hand  has been negatively affected, and he confirms this is his dominant hand. He describes the pain in his arms and tingling and painful, and is constant. He notes relief only while sleeping, but is occasionally awakened by \"sharp shooting pains\" in his neck. The pain in his R leg is described as \"hot, heated\" and has affected his gait and balance. He does not use any walking devices (cane, walker) at this time. Overall he rates his pain as a 6 on average out of 1-10.    He reports he established at the methadone clinic (see above for details) about 1 month ago for opioid dependence and history of heroin abuse. He is currently on methadone 35 mg daily. He denies taking any other pain meds, including opioids, non-opioid prescriptions, and non-prescription analgesics. He denies adverse effects from his methadone, including sedation and nausea, but does endorse some mild slowing of his bowel pattern. It does not appear he has had an EKG to assess QTc interval since initiation of methadone; last EKG in our system was 3/11/21, MXk=108 ms. No relevant labs noted in Care Everywhere.    Salas denies alcohol use, illicit/recreational drug use, and endorses ~1 pack-per-day " "smoking cigarettes. He is currently utilizing nicotine gum as a cessation aid.    He denies use of any opioid pain medications in the past, and he reports this is the first surgery in his life.    He was screened for ketamine use, but carries a diagnosis of paranoid schizophrenia, which is considered an absolute contraindication, even if currently controlled with medications, per the institutional policy \"Ketamine Hydrochloride (Ketalar) for Analgesia (Pediatric and Adult) - Inpatient and Emergency Department.\" However, it should be noted that this guideline does not address palliative sedation or sedation and analgesia for therapeutic and diagnostic procedures.    If immediate assistance is needed please contact the pain service at the number below.     Darin Adam, [place]  PAC Pharmacist  [phone]   September 20, 2022  11:43 AM    If questions or concerns, please contact the Inpatient Pain Service via [facility]: \"PAIN ACUTE INPATIENT PAIN MANAGEMENT ADULT/ [facility]\"    "

## 2022-09-21 ENCOUNTER — TELEPHONE (OUTPATIENT)
Dept: SURGERY | Facility: CLINIC | Age: 57
End: 2022-09-21

## 2022-09-21 ENCOUNTER — VIRTUAL VISIT (OUTPATIENT)
Dept: SURGERY | Facility: CLINIC | Age: 57
End: 2022-09-21
Payer: COMMERCIAL

## 2022-09-21 DIAGNOSIS — Z01.818 PREOP EXAMINATION: Primary | ICD-10-CM

## 2022-09-21 DIAGNOSIS — G95.9 CERVICAL MYELOPATHY (H): ICD-10-CM

## 2022-09-21 PROCEDURE — 99203 OFFICE O/P NEW LOW 30 MIN: CPT | Mod: 95 | Performed by: NURSE PRACTITIONER

## 2022-09-21 ASSESSMENT — COPD QUESTIONNAIRES: COPD: 0

## 2022-09-21 ASSESSMENT — ENCOUNTER SYMPTOMS: ORTHOPNEA: 0

## 2022-09-21 ASSESSMENT — LIFESTYLE VARIABLES: TOBACCO_USE: 1

## 2022-09-21 ASSESSMENT — PAIN SCALES - GENERAL: PAINLEVEL: MODERATE PAIN (4)

## 2022-09-21 NOTE — TELEPHONE ENCOUNTER
Updated Salas of his lexiscan test appointment details - 9/26, 1:00 pm at [facility] (also reviewed prep instructions).  Labs to be drawn after at the [facility] lab where no appointment is necessary (this RN confirmed walk in lab status with ).  Salas expressed understanding.  Dalia Padilla, RN

## 2022-09-21 NOTE — PROGRESS NOTES
Salas is a 57 year old who is being evaluated via a billable video visit.      How would you like to obtain your AVS? email  If the video visit is dropped, the invitation should be resent by: Send to e-mail at: [email or web address]      HPI         Review of Systems         Objective    Vitals - Patient Reported  Pain Score: Moderate Pain (4)        Physical Exam         JINA Perez LPN
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
## 2022-09-27 ENCOUNTER — TELEPHONE (OUTPATIENT)
Dept: ORTHOPEDICS | Facility: CLINIC | Age: 57
End: 2022-09-27

## 2022-09-27 NOTE — TELEPHONE ENCOUNTER
Phoned patient to confirm his surgery with Dr Sullivan scheduled for 9/28/22 will be post-poned until he completes his cardiac workup. Patient understands he will be contacted about rescheduling surgery once those visits are complete.

## 2022-09-28 LAB
ABO/RH(D): NORMAL
ANTIBODY SCREEN: NEGATIVE
SPECIMEN EXPIRATION DATE: NORMAL

## 2022-09-29 ENCOUNTER — HOSPITAL ENCOUNTER (OUTPATIENT)
Dept: NUCLEAR MEDICINE | Facility: CLINIC | Age: 57
Setting detail: NUCLEAR MEDICINE
Discharge: HOME | End: 2022-09-29
Attending: INTERNAL MEDICINE
Payer: COMMERCIAL

## 2022-09-29 ENCOUNTER — LAB (OUTPATIENT)
Dept: LAB | Facility: CLINIC | Age: 57
End: 2022-09-29
Attending: INTERNAL MEDICINE
Payer: COMMERCIAL

## 2022-09-29 ENCOUNTER — HOSPITAL ENCOUNTER (OUTPATIENT)
Dept: CARDIOLOGY | Facility: CLINIC | Age: 57
Discharge: HOME | End: 2022-09-26
Attending: INTERNAL MEDICINE
Payer: COMMERCIAL

## 2022-09-29 VITALS — DIASTOLIC BLOOD PRESSURE: 98 MMHG | HEART RATE: 85 BPM | SYSTOLIC BLOOD PRESSURE: 151 MMHG

## 2022-09-29 DIAGNOSIS — G95.9 CERVICAL MYELOPATHY (H): ICD-10-CM

## 2022-09-29 DIAGNOSIS — Z01.818 PREOP EXAMINATION: ICD-10-CM

## 2022-09-29 DIAGNOSIS — R06.02 SOB (SHORTNESS OF BREATH): ICD-10-CM

## 2022-09-29 LAB
ABO/RH(D): NORMAL
ANION GAP SERPL CALCULATED.3IONS-SCNC: 9 MMOL/L (ref 7–15)
BUN SERPL-MCNC: 10.9 MG/DL (ref 6–20)
CALCIUM SERPL-MCNC: 9.4 MG/DL (ref 8.6–10)
CHLORIDE SERPL-SCNC: 100 MMOL/L (ref 98–107)
CREAT SERPL-MCNC: 0.88 MG/DL (ref 0.67–1.17)
CV STRESS MAX HR HE: 104
DEPRECATED HCO3 PLAS-SCNC: 27 MMOL/L (ref 22–29)
ERYTHROCYTE [DISTWIDTH] IN BLOOD BY AUTOMATED COUNT: 14.2 % (ref 10–15)
GFR SERPL CREATININE-BSD FRML MDRD: >90 ML/MIN/1.73M2
GLUCOSE SERPL-MCNC: 136 MG/DL (ref 70–99)
HCT VFR BLD AUTO: 45.5 % (ref 40–53)
HGB BLD-MCNC: 14.4 G/DL (ref 13.3–17.7)
MCH RBC QN AUTO: 29 PG (ref 26.5–33)
MCHC RBC AUTO-ENTMCNC: 31.6 G/DL (ref 31.5–36.5)
MCV RBC AUTO: 92 FL (ref 78–100)
PLATELET # BLD AUTO: 171 10E3/UL (ref 150–450)
POTASSIUM SERPL-SCNC: 4.3 MMOL/L (ref 3.4–5.3)
RATE PRESSURE PRODUCT: NORMAL
RBC # BLD AUTO: 4.96 10E6/UL (ref 4.4–5.9)
SODIUM SERPL-SCNC: 136 MMOL/L (ref 136–145)
SPECIMEN EXPIRATION DATE: NORMAL
STRESS ECHO BASELINE DIASTOLIC HE: 98
STRESS ECHO BASELINE HR: 75 BPM
STRESS ECHO BASELINE SYSTOLIC BP: 151
STRESS ECHO CALCULATED PERCENT HR: 63 %
STRESS ECHO LAST STRESS DIASTOLIC BP: 90
STRESS ECHO LAST STRESS SYSTOLIC BP: 134
STRESS ECHO TARGET HR: 164
WBC # BLD AUTO: 8.9 10E3/UL (ref 4–11)

## 2022-09-29 PROCEDURE — 85027 COMPLETE CBC AUTOMATED: CPT

## 2022-09-29 PROCEDURE — 78452 HT MUSCLE IMAGE SPECT MULT: CPT | Mod: 26 | Performed by: INTERNAL MEDICINE

## 2022-09-29 PROCEDURE — 343N000001 HC RX 343: Performed by: INTERNAL MEDICINE

## 2022-09-29 PROCEDURE — 78452 HT MUSCLE IMAGE SPECT MULT: CPT

## 2022-09-29 PROCEDURE — 93018 CV STRESS TEST I&R ONLY: CPT | Performed by: INTERNAL MEDICINE

## 2022-09-29 PROCEDURE — 36415 COLL VENOUS BLD VENIPUNCTURE: CPT

## 2022-09-29 PROCEDURE — 86850 RBC ANTIBODY SCREEN: CPT

## 2022-09-29 PROCEDURE — 86901 BLOOD TYPING SEROLOGIC RH(D): CPT

## 2022-09-29 PROCEDURE — A9502 TC99M TETROFOSMIN: HCPCS | Performed by: INTERNAL MEDICINE

## 2022-09-29 PROCEDURE — 80048 BASIC METABOLIC PNL TOTAL CA: CPT

## 2022-09-29 PROCEDURE — 93016 CV STRESS TEST SUPVJ ONLY: CPT | Performed by: INTERNAL MEDICINE

## 2022-09-29 PROCEDURE — 93017 CV STRESS TEST TRACING ONLY: CPT

## 2022-09-29 PROCEDURE — 250N000011 HC RX IP 250 OP 636: Performed by: INTERNAL MEDICINE

## 2022-09-29 RX ORDER — ALBUTEROL SULFATE 90 UG/1
2 AEROSOL, METERED RESPIRATORY (INHALATION) EVERY 5 MIN PRN
Status: ACTIVE | OUTPATIENT
Start: 2022-09-29

## 2022-09-29 RX ORDER — ACYCLOVIR 200 MG/1
0-1 CAPSULE ORAL
Status: DISCONTINUED | OUTPATIENT
Start: 2022-09-29 | End: 2022-09-30 | Stop reason: HOSPADM

## 2022-09-29 RX ORDER — AMINOPHYLLINE 25 MG/ML
50-100 INJECTION, SOLUTION INTRAVENOUS
Status: DISCONTINUED | OUTPATIENT
Start: 2022-09-29 | End: 2022-09-30 | Stop reason: HOSPADM

## 2022-09-29 RX ORDER — REGADENOSON 0.08 MG/ML
INJECTION, SOLUTION INTRAVENOUS
Status: DISPENSED
Start: 2022-09-29 | End: 2022-09-30

## 2022-09-29 RX ORDER — REGADENOSON 0.08 MG/ML
0.4 INJECTION, SOLUTION INTRAVENOUS ONCE
Status: COMPLETED | OUTPATIENT
Start: 2022-09-29 | End: 2022-09-29

## 2022-09-29 RX ORDER — AMINOPHYLLINE 25 MG/ML
50-100 INJECTION, SOLUTION INTRAVENOUS
Status: ACTIVE | OUTPATIENT
Start: 2022-09-29

## 2022-09-29 RX ORDER — ACYCLOVIR 200 MG/1
0-1 CAPSULE ORAL
Status: ACTIVE | OUTPATIENT
Start: 2022-09-29

## 2022-09-29 RX ORDER — REGADENOSON 0.08 MG/ML
0.4 INJECTION, SOLUTION INTRAVENOUS ONCE
Status: ACTIVE | OUTPATIENT
Start: 2022-09-29

## 2022-09-29 RX ORDER — ALBUTEROL SULFATE 90 UG/1
2 AEROSOL, METERED RESPIRATORY (INHALATION) EVERY 5 MIN PRN
Status: DISCONTINUED | OUTPATIENT
Start: 2022-09-29 | End: 2022-09-30 | Stop reason: HOSPADM

## 2022-09-29 RX ADMIN — TETROFOSMIN 10 MCI.: 1.38 INJECTION, POWDER, LYOPHILIZED, FOR SOLUTION INTRAVENOUS at 13:55

## 2022-09-29 RX ADMIN — REGADENOSON 0.4 MG: 0.08 INJECTION, SOLUTION INTRAVENOUS at 14:31

## 2022-09-29 RX ADMIN — TETROFOSMIN 31.5 MCI.: 1.38 INJECTION, POWDER, LYOPHILIZED, FOR SOLUTION INTRAVENOUS at 14:29

## 2022-10-04 ENCOUNTER — DOCUMENTATION ONLY (OUTPATIENT)
Dept: ORTHOPEDICS | Facility: CLINIC | Age: 57
End: 2022-10-04

## 2022-10-04 ENCOUNTER — TELEPHONE (OUTPATIENT)
Dept: SURGERY | Facility: CLINIC | Age: 57
End: 2022-10-04

## 2022-10-04 DIAGNOSIS — M54.2 NECK PAIN: ICD-10-CM

## 2022-10-04 DIAGNOSIS — G95.9 CERVICAL MYELOPATHY (H): Primary | ICD-10-CM

## 2022-10-04 NOTE — RESULT ENCOUNTER NOTE
Alberto Arriaga    Please contact Salas and let him know that his stress test is negative and labs are good too.  I will update his note so they can schedule surgery as soon as they are able.     Thank you!  Adelaide Coffey DNP, RN, ANP-C

## 2022-10-04 NOTE — PROGRESS NOTES
Rescheduled  Patient is scheduled for surgery with Dr. Sullivan    Spoke with: Salas    Date of Surgery: 10/14/22    Location: [facility] OR    Informed patient they will need an adult  Yes    Pre op with Provider PAC (9/21/22)    Pre-procedure COVID-19 Test: 10/10/22    Additional imaging/appointments: POP made    Surgery packet: Received     Additional comments: N/A

## 2022-10-04 NOTE — TELEPHONE ENCOUNTER
"Spoke with Salas to update him of results. Per Adelaide Coffey NP: \"his stress test is negative and labs are good too.\"  Salas expressed understanding and stated his surgery has already been rescheduled.  Dalia Padilla RN  "

## 2022-10-13 ENCOUNTER — TELEPHONE (OUTPATIENT)
Dept: SURGERY | Facility: CLINIC | Age: 57
End: 2022-10-13

## 2022-10-13 ENCOUNTER — TELEPHONE (OUTPATIENT)
Dept: ORTHOPEDICS | Facility: CLINIC | Age: 57
End: 2022-10-13

## 2022-10-13 NOTE — TELEPHONE ENCOUNTER
[facility] Call Center    Phone Message:  Lelia from [facility] called to relay the following information:    Pt needs a NECK CTA, prior to surgery.  Surgery for tomorrow needs to be rescheduled (surgery needs to be rescheduled for a date that is AFTER the NECK CTA is completed).      Please call Lelia, at [phone] with any questions.      Please contact pt in regards to this message.      Thank you.    May a detailed message be left on voicemail: Unknown     Reason for Call: Other: STAT: Surgery TOMORROW:  Needs To Be Cancelled     Action Taken: Message routed to:  [facility] ([facility]): Team and MD    Travel Screening: Not Applicable

## 2022-10-13 NOTE — TELEPHONE ENCOUNTER
See phone call from PAC clinic at 1452 canceling surgery tomorrow 10-14-22.  I called back at 1510 & spoke to Dalia ORNELAS in PAC clinic who had orders from Adelaide Thompson PAC provider & /Rina -see her separate note today 10-13-22.  She called pt & I called  who then reviewed with .  Call back prn. Mary Avalos RN.

## 2022-10-13 NOTE — TELEPHONE ENCOUNTER
Spoke with Salas to update him that surgery will be cancelled for tomorrow.  Dr. Young, who saw Salas in 1/2022, ordered a full stroke workup, which the patient did not do.  Dr. Young would like the patient to have a neck CTA prior to surgery. Salas expressed understanding and would like the neck CTA scheduled the week of October 31st. Dr. Sullivan was notified of this recommendation and agreed to have surgery cancelled.  Dalia Padilla RN

## 2022-10-24 ENCOUNTER — TELEPHONE (OUTPATIENT)
Dept: SURGERY | Facility: CLINIC | Age: 57
End: 2022-10-24

## 2022-10-24 NOTE — TELEPHONE ENCOUNTER
Updated Salas of his CT scan appointment - 11/3, arrive at 11:25 am located at the [facility].  Salas expressed understanding.  Dalia Padilla RN

## 2022-11-01 ENCOUNTER — TELEPHONE (OUTPATIENT)
Dept: ORTHOPEDICS | Facility: CLINIC | Age: 57
End: 2022-11-01

## 2022-11-01 ENCOUNTER — ANESTHESIA EVENT (OUTPATIENT)
Dept: SURGERY | Facility: CLINIC | Age: 57
End: 2022-11-01

## 2022-11-01 NOTE — TELEPHONE ENCOUNTER
Per Dr. Sullivan, RN called and left patient detailed VM, reminding patient to be sure to go to his CTA appt on Nov 3rd.  RN provided call back number in case patient has other questions.    Fawn Banuelos RN

## 2022-11-03 ENCOUNTER — ANCILLARY PROCEDURE (OUTPATIENT)
Dept: CT IMAGING | Facility: CLINIC | Age: 57
End: 2022-11-03
Attending: STUDENT IN AN ORGANIZED HEALTH CARE EDUCATION/TRAINING PROGRAM
Payer: COMMERCIAL

## 2022-11-03 DIAGNOSIS — H53.462 HOMONYMOUS HEMIANOPIA, LEFT: ICD-10-CM

## 2022-11-03 DIAGNOSIS — I63.9 OCCIPITAL INFARCTION (H): ICD-10-CM

## 2022-11-03 PROCEDURE — 70498 CT ANGIOGRAPHY NECK: CPT | Mod: GC | Performed by: RADIOLOGY

## 2022-11-03 PROCEDURE — 70496 CT ANGIOGRAPHY HEAD: CPT | Mod: GC | Performed by: RADIOLOGY

## 2022-11-03 RX ORDER — IOPAMIDOL 755 MG/ML
75 INJECTION, SOLUTION INTRAVASCULAR ONCE
Status: COMPLETED | OUTPATIENT
Start: 2022-11-03 | End: 2022-11-03

## 2022-11-03 RX ADMIN — IOPAMIDOL 75 ML: 755 INJECTION, SOLUTION INTRAVASCULAR at 14:14

## 2022-11-04 ENCOUNTER — TELEPHONE (OUTPATIENT)
Dept: NEUROLOGY | Facility: CLINIC | Age: 57
End: 2022-11-04

## 2022-11-04 NOTE — TELEPHONE ENCOUNTER
Called to inform of normal CTA results.  No flow limiting stenosis seen.  Good news  Discussed no additional risk in this regard with potential surgery.

## 2022-12-14 ENCOUNTER — PRE VISIT (OUTPATIENT)
Dept: NEUROLOGY | Facility: CLINIC | Age: 57
End: 2022-12-14

## 2023-09-14 DIAGNOSIS — M54.2 NECK PAIN: Primary | ICD-10-CM

## 2023-09-19 ENCOUNTER — DOCUMENTATION ONLY (OUTPATIENT)
Dept: NEUROSURGERY | Facility: CLINIC | Age: 58
End: 2023-09-19

## 2023-09-19 NOTE — TELEPHONE ENCOUNTER
FUTURE VISIT INFORMATION      SURGERY INFORMATION:  Date: 9/27/23  Location: [facility]  Surgeon:  Anirudh Sullivan MD   Anesthesia Type:  general  Procedure: Cervical 2 and 7 dome laminectomies and C3-6 laminoplasty with medtronic implants   Consult: ov 9/19/23    RECORDS REQUESTED FROM:       Primary Care Provider:   Abram Bagley MD   - [facility]    Most recent EKG+ Tracing: 3/11/21    Most recent ECHO: 4/4/22    Most recent Cardiac Stress Test: 9/26/22

## 2023-09-19 NOTE — PROGRESS NOTES
Patient is scheduled for surgery with Dr. Sullivan    Spoke with: Patient in clinic    Date of Surgery: 9/27/23    Location: [place]    Post op: 6 weeks    Pre op with Provider: Complete    H&P: Scheduled with PAC 9/21/23    Additional imaging/appointments: CT scan 9/21/23    Surgery packet: Received in clinic     Additional comments: N/A        Lara Tavarez MA on 9/19/2023 at 2:38 PM

## 2023-09-20 ENCOUNTER — ANESTHESIA EVENT (OUTPATIENT)
Dept: SURGERY | Facility: CLINIC | Age: 58
End: 2023-09-20
Payer: COMMERCIAL

## 2023-09-20 RX ORDER — METHADONE HYDROCHLORIDE 10 MG/ML
40 CONCENTRATE ORAL DAILY
COMMUNITY

## 2023-09-20 RX ORDER — OLANZAPINE 10 MG/1
10 TABLET ORAL DAILY PRN
COMMUNITY

## 2023-09-20 RX ORDER — BUPROPION HYDROCHLORIDE 150 MG/1
150 TABLET ORAL EVERY MORNING
COMMUNITY

## 2023-09-20 NOTE — PHARMACY - PREOPERATIVE ASSESSMENT CENTER
PREOPERATIVE PAIN CONSULT FOR POSTOPERATIVE PAIN MANAGEMENT  Salas Macias was interviewed via phone on September 20, 2023 prior to PAC Clinic appointment. Patient is preparing for the planned procedure with Dr. Sullivan on 9/27/23 at the [facility] for Cervical 2 and 7 dome laminectomies and C3-6 laminoplasty with medtronic implants.  Patient seen for same procedure 1 yr ago (almost to the day) in PAC clinic).  Not many changes in medication regimen or pain except as noted below.   These recommendations are intended for patients admitted to the hospital after a procedure and are only valid for 30 days from the date of service. If there are significant changes in opioid dosing between today and day of procedure the below recommendations may have to be adjusted.      OUTPATIENT PRIOR TO ADMISSION MEDICATIONS (related to pain management):  -- Long-acting opioid: methadone concentrated liquid (10 mg/mL) - take 35 mg by mouth once daily for maintenance.  Patient may be increasing dose this week prior to surgery so please re-verify prior to dispensing.   -- Short-acting opioid: none  -- Oral adjuvant(s): none  -- Topicals: none  -- Bowel regimen: none   -- Other relevant medications: olanzapine 20 mg at bedtime and 10 mg PRN. Alprazolam 1 mg TID     Outpatient opioids prescribed by [facility]    RECOMMENDATIONS:   The following pain management recommendations are made based on information from today's visit and should not replace medical decision-making based on patient condition at the time of procedure or postoperatively.      - PREOPERATIVE:  + Long acting opioid - methadone. Take usual dose on the morning of procedure. Patient will have time to take this at clinic prior to his 11:30 AM OR time.   +REMOVE THIS TEXT - REMINDER: Avoid in frail/elderly, use judiciously and for high pain procedures only to avoid sedation  + Before surgery recommend gabapentin 300 mg PO x 1 dose  in pre-op area (Written in pre-op by PAC FILI)   + Before procedure recommend acetaminophen 975 mg PO in pre-op (Written in pre-op by PAC FILI)    - INTRAOPERATIVE (Anesthesiologist/CRNA to consider):   + Regional anesthesia - Defer to RAPS team   + AVOID Ketamine given h/o paranoid schizophrenia.   + Avoid remifentanil - to reduce risk of developing hyperalgesia    - POSTOPERATIVE INPATIENT MANAGEMENT:  Opioid analgesic:  + If able to take oral medications (preferred):           -- Continue methadone concentrated liquid (10 mg/mL) at home dosing - Current dosing is to take 35 mg by mouth once daily for maintenance.  Patient may be increasing dose this week prior to surgery so please re-verify prior to dispensing.           -- Start oxycodone 5-10 mg PO q3hr PRN moderate-severe pain. If pain not controlled on 10 mg dose and no issues with sedation could increase to oxycodone 10-15 mg PO q3hr PRN moderate-severe pain.            -- Start hydromorphone IV 0.3-0.5 mg IV every 2 hr PRN breakthrough pain not controlled by PO medication or prior to significant activity (eg. PT/OT sessions).       + If unable to take oral medications:           -- Continue methadone concentrated liquid (10 mg/mL) at home dosing - Current dosing is to take 35 mg by mouth once daily for maintenance.  Patient may be increasing dose this week prior to surgery so please re-verify prior to dispensing.          --HYDROmorphone (Dilaudid) PCA recommended dose range 0.2-0.4 mg Q 10 min lockout interval with NO continuous rate. Start with 0.2 mg PCA dose Q 10 min lockout interval. If necessary for pain control and improvement in physical function, notify provider for PCA dose increase orders per recommended dose range. Hold or reduce dose for sedation.            + Note: if neuraxial opioid or other long acting opioid (eg. Methadone) is given contact on-call pain provider for adjustment in opioid plan    Nonopioid analgesics:   + Start acetaminophen  "975 mg PO every 6 hr scheduled if no concern about masking fevers  + defer gabapentin to inpatient team.   + Defer use of NSAID to surgeon  + heat/ice machine (Aqua K pad for cool therapy).     Muscle Relaxant:   + start methocarbamol 750 mg PO q6hr PRN muscle spasm    Stool softeners/Laxatives:   + When appropriate start senna-docusate 1-2 tabs PO BID and Miralax 17 g daily to prevent opioid induced constipation.     Other:  + Please consult the inpatient pain management service for postoperative pain management recommendations.     + Defer any plan for lidocaine infusion to primary service      + Recommend close monitoring of respiratory status postoperatively with capnography and continuous SpO2 monitoring. Would recommend continuing capnography beyond the usual 24 hr due to patient's opioid tolerance.   -------------------------------------------------------------------------------------------------------------------  ASSESSMENT:    Salas Macias has a history of cervical radiculopathy and is preparing for above mentioned surgery.  He states his pain is located in his L neck, L arm, and R leg, and each location has different features. His L neck pain radiates into his bilateral arms L > R. His left hand  has been negatively affected, and he confirms this is his dominant hand. He describes the pain in his arms and tingling and painful, and is constant. He notes relief only while sleeping, but is occasionally awakened by \"sharp shooting pains\" in his neck. The pain in his R leg is described as \"hot, heated\" and has affected his gait and balance. He does not use any walking devices (cane, walker) at this time. Overall he rates his pain as a 4-5 on average on a 0-10 numeric scale.  Patient does endorse increased pain in his R hand that is aching and numb in nature that is worse form when last seen a year ago.      He reports he established at the methadone clinic (see other note for details) for opioid " "dependence and history of heroin abuse. He is currently on methadone 35 mg daily. He denies taking any other pain meds, including opioids, non-opioid prescriptions, and non-prescription analgesics. He denies adverse effects from his methadone, including sedation and nausea, but does endorse some mild slowing of his bowel pattern. It does not appear he has had an EKG to assess QTc interval since initiation of methadone; last EKG in our system was 3/11/21, NQf=348 ms. No relevant labs noted in Care Everywhere.    He denies use of any opioid pain medications in the past, and he reports this is the first surgery in his life.     He was screened for ketamine use, but carries a diagnosis of paranoid schizophrenia, which is considered an absolute contraindication, even if currently controlled with medications, per the institutional policy \"Ketamine Hydrochloride (Ketalar) for Analgesia (Pediatric and Adult) - Inpatient and Emergency Department.\" However, it should be noted that this guideline does not address palliative sedation or sedation and analgesia for therapeutic and diagnostic procedures.    If immediate assistance is needed please contact the pain service at the number below.     Steve Monroe [facility]  September 20, 2023  11:48 AM    If questions or concerns, please contact the Inpatient Pain Service via [facility]: \"PAIN MANAGEMENT ACUTE INPATIENT/ [facility] or [facility] (depending on location of patient)    "

## 2023-09-20 NOTE — PROGRESS NOTES
Opioid Treatment Program (Methadone Clinic) Information Note      Treatment program name: [place] [facility]    Location (city): [place], [place]   Phone number: [phone]              Spoke with: patient's counselor       Patient's current methadone dose verified as: 35 mg PO daily. Note that patient may be going up on this dose prior to OR. Please re-verify with clinic/patient prior to dosing while inpatient after upcoming surgery on 9/27/23.    Last dose was administered on: 9/20/23    Note(s): Treatment programs in [place] dispense methadone using the 10 mg/ml oral concentrate.      Steve Monroe, Pharm.D., BCPS

## 2023-09-20 NOTE — PROGRESS NOTES
Preoperative Assessment Center Medication History Note  Medication history completed on September 20, 2023 by this writer prior to patient's PAC appointment. See Epic admission navigator for prior to admission medications. Operating room staff will still need to confirm medications and last dose information on day of surgery.     Medication history interview sources  Patient and CareEverywhere/SureScripts via phone  Also call to psychiatrist's office [facility] to confirm psychiatric meds.  Call out to patient's methadone clinic as well.     Changes made to PTA medication list  Added: wellbutrin  Deleted: fluoxetine, viagra.   Changed: methadone dose/sig, olanzapine dose/sig    Allergies reviewed with patient and updates made in EHR: yes    -- aspirin 81 & atorvastatin - has been off for > 1 year for both no plan to resume per patient. Appears from notes these are still supposed to be active so left on the list and marked as such.     -- No recent (within 30 days) course of antibiotics  -- No recent (within 30 days) course of systemic steroids  -- Reports not being on blood thinning medications    -- Declines being on any other prescription or over-the-counter medications    Prior to Admission medications    Medication Sig Last Dose Taking? Auth Provider Long Term End Date   ALPRAZolam (XANAX) 1 MG tablet Take 1 mg by mouth 3 times daily Taking Yes Reported, Patient     buPROPion (WELLBUTRIN XL) 150 MG 24 hr tablet Take 150 mg by mouth every morning Taking Yes Unknown, Entered By History Yes    methadone (DOLOPHINE-INTENSOL) 10 MG/ML (HIGH CONC) solution Take 35 mg by mouth daily Dose verified on September 20, 2023 - note that patient may be going up on his dose to 40 mg daily in near future so please re-verify Taking Yes Unknown, Entered By History     nicotine polacrilex (NICORETTE) 4 MG gum Chew one piece every 1-2 hours as directed. Taking Yes Abram Bagley MD     OLANZapine (ZYPREXA) 10 MG tablet  Take 10 mg by mouth daily as needed Taking Yes Unknown, Entered By History Yes    OLANZapine (ZYPREXA) 20 MG tablet Take 20 mg by mouth At Bedtime Taking Yes Reported, Patient Yes    vitamin D3 (CHOLECALCIFEROL) 50 mcg (2000 units) tablet Take 1 tablet (50 mcg) by mouth daily Taking Yes Jj Hale APRN CNP     aspirin (ASA) 81 MG chewable tablet Take 1 tablet (81 mg) by mouth daily  Patient not taking: Reported on 9/15/2022 Not Taking  Markos Young MD     atorvastatin (LIPITOR) 20 MG tablet Take 1 tablet (20 mg) by mouth daily  Patient not taking: Reported on 9/15/2022 Not Taking  Markos Young MD Yes         Medication History Completed By: Steve Monroe RPH 9/20/2023 10:30 AM

## 2023-09-20 NOTE — ANESTHESIA PREPROCEDURE EVALUATION
Anesthesia Pre-Procedure Evaluation    Patient: Salas Macias   MRN: [id number] : 1965        Procedure : Procedure(s):  Cervical 2 and 7 dome laminectomies and C3-6 laminoplasty with medtronic implants  PAC EVALUATION       Past Medical History:   Diagnosis Date    Depression     Paranoid schizophrenia (H)       No past surgical history on file.   No Known Allergies   Social History     Tobacco Use    Smoking status: Every Day     Packs/day: 1.00     Years: 37.00     Pack years: 37.00     Types: Cigarettes    Smokeless tobacco: Never   Substance Use Topics    Alcohol use: No      Wt Readings from Last 1 Encounters:   23 82.6 kg (182 lb 1.6 oz)        Anesthesia Evaluation            ROS/MED HX  ENT/Pulmonary:     (+)                tobacco use, Current use, 1 packs/day, 37  Pack-Year Hx,                  (-) asthma, COPD, JEFFREY risk factors and recent URI   Neurologic: Comment:   ## Cervical myelopathy - numbness and weakness in hands (L>R), bilateral hand neuropathic pain, pain radiates up left arm.    -- CT scan 2023: The cervical vertebrae are normally aligned. Normal cervical lordosis. No acute fracture or subluxation. No prevertebral edema. There is multilevel disc height narrowing throughout the cervical spine, greatest at C3-4, C4-5, and C6-7.    ## Chart reports Hx/o Stroke/TIA that presented with Left Homonymous hemianopia.  --  CT & CTA: demonstrated no aneurysm or stenosis of the major  intracranial arteries. No large vessel occlusion. Neck CTA demonstrated no stenosis of the major cervical arteries.        (+)              TIA (evidence of previous TIA on MRI last year), date: unknown, features: unknown.                Cardiovascular:     (+)  - -   -  - -           THOMAS.                      Previous cardiac testing   Echo: Date: Results:    Stress Test:  Date: 2022 Results:    Stress test negative for inducible ischemia.   Report:   -- Baseline electrocardiogram demonstrates  sinus rhythm. Normal. The stress electrocardiogram is negative for inducible ischemic EKG changes.  There were no arrhythmias during stress.  Transient anterior T wave inversion but no ST segment changes. There were no arrhythmias during recovery.  -- Technical Comments: pharmacologic stress test with sitting Lexiscan protocol using 0.4 mg of intravenous regadenoson administered over 10 seconds.The patient reported chest discomfort and dyspnea during the stress test.    ECG Reviewed:  Date: Results:    Cath:  Date: Results:   (-) hypertension and orthopnea/PND   METS/Exercise Tolerance: 3 - Able to walk 1-2 blocks without stopping Comment: Doesn't purposefully exercise.   Hematologic:  - neg hematologic  ROS  (-) history of blood clots and history of blood transfusion   Musculoskeletal:  - neg musculoskeletal ROS     GI/Hepatic: Comment: Rare GERD symptoms just with spicy foods.  No meds    (+) GERD,                   Renal/Genitourinary:  - neg Renal ROS     Endo: Comment: prediabetes      Psychiatric/Substance Use: Comment: Heroin abuse in remission since 8/12/22    (+) psychiatric history anxiety, depression and schizophrenia (paranoid schizophrenia)    (-) alcohol abuse history   Infectious Disease:  - neg infectious disease ROS  (-) Recent Fever   Malignancy:  - neg malignancy ROS     Other:  - neg other ROS          Physical Exam    Airway  airway exam normal      Mallampati: I   TM distance: > 3 FB   Neck ROM: limited   Mouth opening: > 3 cm    Respiratory Devices and Support         Dental       (+) Modest Abnormalities - crowns, retainers, 1 or 2 missing teeth      Cardiovascular   cardiovascular exam normal          Pulmonary   pulmonary exam normal                OUTSIDE LABS:  CBC:   Lab Results   Component Value Date    WBC 8.9 09/29/2022    WBC 9.1 04/18/2022    HGB 14.4 09/29/2022    HGB 14.6 04/18/2022    HCT 45.5 09/29/2022    HCT 45.9 04/18/2022     09/29/2022     04/18/2022     BMP:    Lab Results   Component Value Date     09/29/2022     02/09/2022    POTASSIUM 4.3 09/29/2022    POTASSIUM 4.4 02/09/2022    CHLORIDE 100 09/29/2022    CHLORIDE 106 02/09/2022    CO2 27 09/29/2022    CO2 29 02/09/2022    BUN 10.9 09/29/2022    BUN 17 02/09/2022    CR 0.88 09/29/2022    CR 0.90 02/09/2022     (H) 09/29/2022     (H) 02/09/2022     COAGS: No results found for: PTT, INR, FIBR  POC: No results found for: BGM, HCG, HCGS  HEPATIC:   Lab Results   Component Value Date    ALBUMIN 4.1 02/09/2022    PROTTOTAL 7.5 02/09/2022    ALT 25 02/09/2022    AST 18 02/09/2022    ALKPHOS 82 02/09/2022    BILITOTAL 0.4 02/09/2022     OTHER:   Lab Results   Component Value Date    A1C 6.1 (H) 04/18/2022    TAMMIE 9.4 09/29/2022    LIPASE 49 (L) 04/18/2022    AMYLASE 91 04/18/2022    TSH 1.04 04/18/2022       Anesthesia Plan    ASA Status:  3    NPO Status:  NPO Appropriate    Anesthesia Type: General.     - Airway: ETT   Induction: Intravenous.   Maintenance: Balanced.   Techniques and Equipment:     - Lines/Monitors: 2nd IV, Arterial Line     - Drips/Meds: Phenylephrine     Consents    Anesthesia Plan(s) and associated risks, benefits, and realistic alternatives discussed. Questions answered and patient/representative(s) expressed understanding.     - Discussed:     - Discussed with:  Patient      - Extended Intubation/Ventilatory Support Discussed: No.      - Patient is DNR/DNI Status: No     Use of blood products discussed: Yes.     - Discussed with: Patient.     - Consented: consented to blood products            Reason for refusal: other.     Postoperative Care    Pain management: Oral pain medications, IV analgesics.   PONV prophylaxis: Dexamethasone or Solumedrol, Ondansetron (or other 5HT-3)     Comments:              PAC Discussion and Assessment                                                      PAC Pharmacist Assessment: PREOPERATIVE PAIN CONSULT FOR POSTOPERATIVE PAIN  MANAGEMENT  Salas JOSE ELIAS Macias was interviewed via phone on September 20, 2023 prior to PAC Clinic appointment. Patient is preparing for the planned procedure with Dr. Sullivan on 9/27/23 at the [facility] for Cervical 2 and 7 dome laminectomies and C3-6 laminoplasty with medtronic implants.  Patient seen for same procedure 1 yr ago (almost to the day) in PAC clinic).  Not many changes in medication regimen or pain except as noted below.   These recommendations are intended for patients admitted to the hospital after a procedure and are only valid for 30 days from the date of service. If there are significant changes in opioid dosing between today and day of procedure the below recommendations may have to be adjusted.      OUTPATIENT PRIOR TO ADMISSION MEDICATIONS (related to pain management):  -- Long-acting opioid: methadone concentrated liquid (10 mg/mL) - take 35 mg by mouth once daily for maintenance.  Patient may be increasing dose this week prior to surgery so please re-verify prior to dispensing.   -- Short-acting opioid: none  -- Oral adjuvant(s): none  -- Topicals: none  -- Bowel regimen: none   -- Other relevant medications: olanzapine 20 mg at bedtime and 10 mg PRN. Alprazolam 1 mg TID     Outpatient opioids prescribed by [facility]    RECOMMENDATIONS:   The following pain management recommendations are made based on information from today's visit and should not replace medical decision-making based on patient condition at the time of procedure or postoperatively.      - PREOPERATIVE:  + Long acting opioid - methadone. Take usual dose on the morning of procedure. Patient will have time to take this at clinic prior to his 11:30 AM OR time.   +REMOVE THIS TEXT - REMINDER: Avoid in frail/elderly, use judiciously and for high pain procedures only to avoid sedation  + Before surgery recommend gabapentin 300 mg PO x 1 dose in pre-op area (Written in pre-op by PAC FILI)    + Before procedure recommend acetaminophen 975 mg PO in pre-op (Written in pre-op by PAC FILI)    - INTRAOPERATIVE (Anesthesiologist/CRNA to consider):   + Regional anesthesia - Defer to RAPS team   + AVOID Ketamine given h/o paranoid schizophrenia.   + Avoid remifentanil - to reduce risk of developing hyperalgesia    - POSTOPERATIVE INPATIENT MANAGEMENT:  Opioid analgesic:  + If able to take oral medications (preferred):           -- Continue methadone concentrated liquid (10 mg/mL) at home dosing - Current dosing is to take 35 mg by mouth once daily for maintenance.  Patient may be increasing dose this week prior to surgery so please re-verify prior to dispensing.           -- Start oxycodone 5-10 mg PO q3hr PRN moderate-severe pain. If pain not controlled on 10 mg dose and no issues with sedation could increase to oxycodone 10-15 mg PO q3hr PRN moderate-severe pain.            -- Start hydromorphone IV 0.3-0.5 mg IV every 2 hr PRN breakthrough pain not controlled by PO medication or prior to significant activity (eg. PT/OT sessions).       + If unable to take oral medications:           -- Continue methadone concentrated liquid (10 mg/mL) at home dosing - Current dosing is to take 35 mg by mouth once daily for maintenance.  Patient may be increasing dose this week prior to surgery so please re-verify prior to dispensing.          --HYDROmorphone (Dilaudid) PCA recommended dose range 0.2-0.4 mg Q 10 min lockout interval with NO continuous rate. Start with 0.2 mg PCA dose Q 10 min lockout interval. If necessary for pain control and improvement in physical function, notify provider for PCA dose increase orders per recommended dose range. Hold or reduce dose for sedation.            + Note: if neuraxial opioid or other long acting opioid (eg. Methadone) is given contact on-call pain provider for adjustment in opioid plan    Nonopioid analgesics:   + Start acetaminophen 975 mg PO every 6 hr scheduled if no concern  "about masking fevers  + defer gabapentin to inpatient team.   + Defer use of NSAID to surgeon  + heat/ice machine (Aqua K pad for cool therapy).     Muscle Relaxant:   + start methocarbamol 750 mg PO q6hr PRN muscle spasm    Stool softeners/Laxatives:   + When appropriate start senna-docusate 1-2 tabs PO BID and Miralax 17 g daily to prevent opioid induced constipation.     Other:  + Please consult the inpatient pain management service for postoperative pain management recommendations.     + Defer any plan for lidocaine infusion to primary service      + Recommend close monitoring of respiratory status postoperatively with capnography and continuous SpO2 monitoring. Would recommend continuing capnography beyond the usual 24 hr due to patient's opioid tolerance.   -------------------------------------------------------------------------------------------------------------------  ASSESSMENT:    Salas Macias has a history of cervical radiculopathy and is preparing for above mentioned surgery.  He states his pain is located in his L neck, L arm, and R leg, and each location has different features. His L neck pain radiates into his bilateral arms L > R. His left hand  has been negatively affected, and he confirms this is his dominant hand. He describes the pain in his arms and tingling and painful, and is constant. He notes relief only while sleeping, but is occasionally awakened by \"sharp shooting pains\" in his neck. The pain in his R leg is described as \"hot, heated\" and has affected his gait and balance. He does not use any walking devices (cane, walker) at this time. Overall he rates his pain as a 4-5 on average on a 0-10 numeric scale.  Patient does endorse increased pain in his R hand that is aching and numb in nature that is worse form when last seen a year ago.      He reports he established at the methadone clinic (see other note for details) for opioid dependence and history of heroin abuse. He is " "currently on methadone 35 mg daily. He denies taking any other pain meds, including opioids, non-opioid prescriptions, and non-prescription analgesics. He denies adverse effects from his methadone, including sedation and nausea, but does endorse some mild slowing of his bowel pattern. It does not appear he has had an EKG to assess QTc interval since initiation of methadone; last EKG in our system was 3/11/21, JGp=718 ms. No relevant labs noted in Care Everywhere.    He denies use of any opioid pain medications in the past, and he reports this is the first surgery in his life.     He was screened for ketamine use, but carries a diagnosis of paranoid schizophrenia, which is considered an absolute contraindication, even if currently controlled with medications, per the institutional policy \"Ketamine Hydrochloride (Ketalar) for Analgesia (Pediatric and Adult) - Inpatient and Emergency Department.\" However, it should be noted that this guideline does not address palliative sedation or sedation and analgesia for therapeutic and diagnostic procedures.    If immediate assistance is needed please contact the pain service at the number below.     Steve Monroe RPH  September 20, 2023  11:48 AM    If questions or concerns, please contact the Inpatient Pain Service via [facility]: \"PAIN MANAGEMENT ACUTE INPATIENT/ [facility] or [facility] (depending on location of patient)    Reviewed and Signed by PAC Pharmacist  Pharmacist: ZOIE  Date: 9/20/23  Time: 1218   Steve Monroe RPH  "

## 2023-09-21 ENCOUNTER — ALLIED HEALTH/NURSE VISIT (OUTPATIENT)
Dept: SURGERY | Facility: CLINIC | Age: 58
End: 2023-09-21

## 2023-09-21 ENCOUNTER — PRE VISIT (OUTPATIENT)
Dept: SURGERY | Facility: CLINIC | Age: 58
End: 2023-09-21

## 2023-09-21 ENCOUNTER — VIRTUAL VISIT (OUTPATIENT)
Dept: SURGERY | Facility: CLINIC | Age: 58
End: 2023-09-21
Payer: COMMERCIAL

## 2023-09-21 DIAGNOSIS — Z01.818 PREOP EXAMINATION: Primary | ICD-10-CM

## 2023-09-21 DIAGNOSIS — G95.9 CERVICAL MYELOPATHY (H): ICD-10-CM

## 2023-09-21 LAB
ABO/RH(D): NORMAL
ANTIBODY SCREEN: NEGATIVE
SPECIMEN EXPIRATION DATE: NORMAL

## 2023-09-21 PROCEDURE — 99214 OFFICE O/P EST MOD 30 MIN: CPT | Mod: 95 | Performed by: CLINICAL NURSE SPECIALIST

## 2023-09-21 ASSESSMENT — ENCOUNTER SYMPTOMS
SEIZURES: 0
ORTHOPNEA: 0
DYSRHYTHMIAS: 0

## 2023-09-21 ASSESSMENT — LIFESTYLE VARIABLES: TOBACCO_USE: 1

## 2023-09-21 ASSESSMENT — COPD QUESTIONNAIRES: COPD: 0

## 2023-09-21 ASSESSMENT — PAIN SCALES - GENERAL: PAINLEVEL: NO PAIN (0)

## 2023-09-21 NOTE — PROGRESS NOTES
Salas is a 58 year old who is being evaluated via a billable video visit.      How would you like to obtain your AVS? E-MAIL  If the video visit is dropped, the invitation should be resent by: Text to cell phone: [phone]          HPI                 Review of Systems               Physical Exam

## 2023-09-21 NOTE — H&P
Pre-Operative H & P     CC:  Preoperative exam to assess for increased cardiopulmonary risk while undergoing surgery and anesthesia.    Date of Encounter: 9/21/2023  Primary Care Physician:  Abram Bagley     Reason for visit:   Encounter Diagnoses   Name Primary?    Preop examination Yes    Cervical myelopathy (H)        HPI  Salas Maicas is a 58 year old male who presents for pre-operative H & P in preparation for  Procedure Information       Case: [id number] Date/Time: 09/27/23 1130    Procedure: Cervical 2 and 7 dome laminectomies and C3-6 laminoplasty with medtronic implants (Spine)    Anesthesia type: General    Diagnosis: Cervical myelopathy (H) [G95.9]    Pre-op diagnosis: Cervical myelopathy (H) [G95.9]    Location:  OR [address] [place] [address] OR    Providers: Anirudh Sullivan MD          History is obtained from the patient and chart review    Patient who has been followed by Dr. Sullivan for cervical myelopathy. He was last seen in fall 2022 but was unable to schedule surgery at that time. In a more recent follow up with Dr. Sullivan, it was noted that patient had continued neurologic decline with diffuse numbness and tingling, and reported dropping things. Per notes, his work up has revealed severe cervical cord stenosis with preserved cervical alignment but progressive symptoms of myelopathy. He was counseled for above procedure.    His history is otherwise significant for HLD, HYPERTENSION, continued smoking, GERD, prediabetes, possible old CVA/TIA, paranoid schizophrenia, depression, anxiety and heroin abuse in remission. He is currently on methadone.       Hx of abnormal bleeding or anti-platelet use: Denies       Past Medical History  Past Medical History:   Diagnosis Date    Anxiety     Cervical myelopathy     Depression     HLD (hyperlipidemia)     Paranoid schizophrenia (H)        Past Surgical History  No past surgical history on file.    Prior to Admission Medications  Current Outpatient  Medications   Medication Sig Dispense Refill    ALPRAZolam (XANAX) 1 MG tablet Take 1 mg by mouth 3 times daily      buPROPion (WELLBUTRIN XL) 150 MG 24 hr tablet Take 150 mg by mouth every morning      methadone (DOLOPHINE-INTENSOL) 10 MG/ML (HIGH CONC) solution Take 35 mg by mouth daily Dose verified on September 20, 2023 - note that patient may be going up on his dose to 40 mg daily in near future so please re-verify      nicotine polacrilex (NICORETTE) 4 MG gum Chew one piece every 1-2 hours as directed. 100 each 3    OLANZapine (ZYPREXA) 10 MG tablet Take 10 mg by mouth daily as needed      OLANZapine (ZYPREXA) 20 MG tablet Take 20 mg by mouth At Bedtime      vitamin D3 (CHOLECALCIFEROL) 50 mcg (2000 units) tablet Take 1 tablet (50 mcg) by mouth daily 90 tablet 3    aspirin (ASA) 81 MG chewable tablet Take 1 tablet (81 mg) by mouth daily (Patient not taking: Reported on 9/15/2022) 30 tablet 4    atorvastatin (LIPITOR) 20 MG tablet Take 1 tablet (20 mg) by mouth daily (Patient not taking: Reported on 9/15/2022) 30 tablet 4       Allergies  No Known Allergies    Social History  Social History     Socioeconomic History    Marital status: Single     Spouse name: Not on file    Number of children: Not on file    Years of education: Not on file    Highest education level: Not on file   Occupational History    Occupation: Unemployment   Tobacco Use    Smoking status: Every Day     Packs/day: 1.00     Years: 37.00     Pack years: 37.00     Types: Cigarettes    Smokeless tobacco: Never    Tobacco comments:     20 cigarettes daily    Substance and Sexual Activity    Alcohol use: No    Drug use: No    Sexual activity: Not Currently   Other Topics Concern    Not on file   Social History Narrative    Not on file     Social Determinants of Health     Financial Resource Strain: Not on file   Food Insecurity: Not on file   Transportation Needs: Not on file   Physical Activity: Not on file   Stress: Not on file   Social  Connections: Not on file   Interpersonal Safety: Not on file   Housing Stability: Not on file       Family History  Family History   Problem Relation Age of Onset    Hypertension Mother     Peripheral Vascular Disease Mother     Restless Leg Syndrome Mother     Alzheimer Disease Father     Hypertension Sister     Diabetes No family hx of     Glaucoma No family hx of     Macular Degeneration No family hx of     Anesthesia Reaction No family hx of     Thrombosis No family hx of        Review of Systems  The complete review of systems is negative other than noted in the HPI or here.   Anesthesia Evaluation   Pt has not had prior anesthetic         ROS/MED HX  ENT/Pulmonary: Comment: Smoking 20 cigs daily    (+)                tobacco use, Current use,                   (-) asthma, COPD, JEFFREY risk factors and recent URI   Neurologic: Comment: Cervical myelopathy - numbness and weakness in hands (L>R), bilateral hand neuropathic pain, pain radiates up left arm.      (+)              TIA (evidence of previous TIA on MRI last year), date: unknown, features: unknown.             (-) no seizures   Cardiovascular:     (+)  - -   -  - -                                 Previous cardiac testing   Echo: Date: 4/4/22 Results:    Stress Test:  Date: 9/29/22 Results:    ECG Reviewed:  Date: 2021 Results:    Cath:  Date: Results:   (-) hypertension, THOMAS, orthopnea/PND and arrhythmias   METS/Exercise Tolerance: 3 - Able to walk 1-2 blocks without stopping Comment: Doesn't purposefully exercise.   Hematologic:  - neg hematologic  ROS  (-) history of blood clots and history of blood transfusion   Musculoskeletal:  - neg musculoskeletal ROS     GI/Hepatic: Comment: Rare GERD symptoms just with spicy foods.  No meds    (+) GERD,                   Renal/Genitourinary:  - neg Renal ROS     Endo: Comment: prediabetes      Psychiatric/Substance Use: Comment: Heroin abuse in remission since 8/12/22    (+) psychiatric history anxiety and  depression (paranoid schizophrenia)    (-) alcohol abuse history   Infectious Disease:  - neg infectious disease ROS  (-) Recent Fever   Malignancy:  - neg malignancy ROS     Other:  - neg other ROS          Virtual visit -  No vitals were obtained    Physical Exam  Constitutional: Awake, alert, no apparent distress, and appears stated age. Accompanied by family.   HENT: Normocephalic  Respiratory: non labored breathing; no cough   Neurologic: Oriented to name, place and time.   Neuropsychiatric: Calm, cooperative. Normal affect.      Prior Labs/Diagnostic Studies   All labs and imaging personally reviewed   Lab Results   Component Value Date    WBC 8.9 2022    WBC 11.0 2021     Lab Results   Component Value Date    RBC 4.96 2022    RBC 5.11 2021     Lab Results   Component Value Date    HGB 14.4 2022    HGB 15.0 2021     Lab Results   Component Value Date    HCT 45.5 2022    HCT 47.8 2021     Lab Results   Component Value Date    MCV 92 2022    MCV 94 2021     Lab Results   Component Value Date    MCH 29.0 2022    MCH 29.4 2021     Lab Results   Component Value Date    MCHC 31.6 2022    MCHC 31.4 2021     Lab Results   Component Value Date    RDW 14.2 2022    RDW 14.4 2021     Lab Results   Component Value Date     2022     2021     Last Comprehensive Metabolic Panel:  Lab Results   Component Value Date     2022    POTASSIUM 4.3 2022    CHLORIDE 100 2022    CO2 27 2022    ANIONGAP 9 2022     (H) 2022    BUN 10.9 2022    CR 0.88 2022    GFRESTIMATED >90 2022    TAMMIE 9.4 2022     EK Sinus tachycardia rate 109    Echocardiogram 22  Interpretation Summary  Global and regional left ventricular function is normal with an EF of 55-60%.  Right ventricular function, chamber size, wall motion, and thickness are  normal.  No  significant valvular abnormalities were noted.  Previous study not available for comparison.    Lexiscan 9/29/22    The nuclear stress test is negative for inducible myocardial ischemia or infarction. Left ventricular function is normal.    There is no prior study for comparison    Updated CT cervical spine pending    CTA Head/Neck 11/3/22  FINDINGS:  Head CTA demonstrates no intracranial arterial aneurysm or stenosis.  No large vessel occlusion. Hypoplastic appearance of the left A1  segment. Previously identified right parieto-occpital infarction on  MR, 1/5/2022 is not well appreciated on this examination.                                                               IMPRESSION:    1. Head CTA demonstrates no aneurysm or stenosis of the major  intracranial arteries. No large vessel occlusion.  2. Neck CTA demonstrates no stenosis of the major cervical arteries.     The patient's records and results personally reviewed by this provider.     Outside records reviewed from: Care Everywhere      Assessment    Salas Macias is a 58 year old male seen as a PAC referral for risk assessment and optimization for anesthesia.    Plan/Recommendations  Pt will be optimized for the proposed procedure.  See below for details on the assessment, risk, and preoperative recommendations    NEUROLOGY  - History of TIA - he notes that he was told that a previous MRI showed evidence of a possible history of a TIA or CVA. He doesn't recall any time in which he had any symptoms of such. Denies residual or recurrence  - Denies seizure history  - Progressive cervical myelopathy    -Post Op delirium risk factors:  No risk identified    ENT  - No current airway concerns.  Will need to be reassessed day of surgery.  Mallampati: Unable to assess  TM: Unable to assess    CARDIAC  HLD. Is not taking atorvastatin or ASA for past year. Denies chest pain, irregular HR or DYSPNEA ON EXERTION. Does not exercise purposefully but able to walk 2  "blocks without stopping for shortness of breath. Lexiscan on 9/29/22 negative. Does not self monitor BP.   METS ~4    RCRI: 0.9% risk of serious cardiac events    PULMONARY  Continued smoking 20 cigs/day. Has nicotine patches. Will notify smoking cessation team as he is interested   Denies asthma, cough or shortness of breath    Low risk for JEFFREY  - Tobacco History    History   Smoking Status    Every Day    Packs/day: 1.00    Years: 37.00    Types: Cigarettes   Smokeless Tobacco    Never       GI: Heartburn symptoms only with spicy food eaten late at night. No regular meds.  PONV Low Risk  Total Score: 1           1 AN PONV: Intended Post Op Opioids        /RENAL  - Baseline Creatinine  0.88    ENDOCRINE    - BMI: Estimated body mass index is 24.03 kg/m  as calculated from the following:    Height as of 9/19/23: 1.854 m (6' 1\").    Weight as of 9/19/23: 82.6 kg (182 lb 1.6 oz).  Healthy Weight (BMI 18.5-24.9)  No history of Diabetes Mellitus, but is diagnosed with prediabetes. No diabetes meds. Last A1C 6.1 followed by PCP.      HEME  VTE Low Risk 0.26%            Total Score: 0      Denies history of blood clots  Denies history of blood transfusion     Updated labs with type and screen planned for tomorrow    PSYCH  - Paranoid schizophrenia. Anxiety/depression.   Will take Wellbutrin and Xanax on DOS. Zyprexa at HS and prn    History of heroin abuse in remission since 8/12/22. Stable on methadone currently. Will take on DOS. See PharmD note.     Different anesthesia methods/types have been discussed with the patient, but they are aware that the final plan will be decided by the assigned anesthesia provider on the date of service.  Patient was discussed with Dr Flynn    The patient is optimized for their procedure. AVS with information on surgery time/arrival time, meds and NPO status given by nursing staff. No further diagnostic testing indicated.    Please refer to the physical examination documented by the " anesthesiologist in the anesthesia record on the day of surgery.    Lab update 9/22/23  Lab Results   Component Value Date    WBC 8.6 09/22/2023    WBC 11.0 03/11/2021     Lab Results   Component Value Date    RBC 4.76 09/22/2023    RBC 5.11 03/11/2021     Lab Results   Component Value Date    HGB 13.8 09/22/2023    HGB 15.0 03/11/2021     Lab Results   Component Value Date    HCT 43.8 09/22/2023    HCT 47.8 03/11/2021     Lab Results   Component Value Date    MCV 92 09/22/2023    MCV 94 03/11/2021     Lab Results   Component Value Date    MCH 29.0 09/22/2023    MCH 29.4 03/11/2021     Lab Results   Component Value Date    MCHC 31.5 09/22/2023    MCHC 31.4 03/11/2021     Lab Results   Component Value Date    RDW 14.7 09/22/2023    RDW 14.4 03/11/2021     Lab Results   Component Value Date     09/22/2023     03/11/2021     Last Comprehensive Metabolic Panel:  Lab Results   Component Value Date     09/22/2023    POTASSIUM 5.1 09/22/2023    CHLORIDE 101 09/22/2023    CO2 31 (H) 09/22/2023    ANIONGAP 7 09/22/2023     (H) 09/22/2023    BUN 13.1 09/22/2023    CR 0.80 09/22/2023    GFRESTIMATED >90 09/22/2023    TAMMIE 9.4 09/22/2023     Type and screen      Video-Visit Details    Type of service:  Video Visit    Provider received verbal consent for a Video Visit from the patient? Yes   Video Start Time: 8:49am  Video End Time: 8:57am     Originating Location (pt. Location): Other In car with a     Distant Location (provider location):  Off-site  Mode of Communication:  Video Conference via [employer]  On the day of service:     Prep time: 15 minutes  Visit time: 8 minutes  Documentation time: 15 minutes  ------------------------------------------  Total time: 38 minutes      Wendy Cheryl López, APRN CNS  Preoperative Assessment Center  [facility]  Phone: [phone]  Fax: [phone]

## 2023-09-21 NOTE — PATIENT INSTRUCTIONS
Preparing for Your Surgery      Name:  Salas Macias   MRN:  [id number]   :  1965   Today's Date:  2023       Arriving for surgery:  Surgery date:  23  Arrival time:  9 am    Please come to:     [facility] [address]  (Address takes you to the [facility].)  [address] [place], [place] [address]. [place]  [address]  The Green Ramp for patients and visitors is located beneath the [facility]. The parking facility entrance is at the intersection of [address] [place] [address] and [address]. Patients and visitors who self-park will receive the reduced hospital parking rate (no ticket validation needed).  [employer] parking, located at the [facility] main entrance on [address] [place] [address], is available Monday - Friday from 7 am to 3:30 pm.  Discounted parking pass options can be purchased from  attendants during business hours.  -Check in at the security desk in the [facility] ([facility]) Lobby. They will direct you to the correct elevators.  -Proceed to the 3rd floor, check in at the Adult Surgery Waiting Lounge. [phone]  If you are in need of directions, a wheelchair or escort please stop at the Information Desk in the lobby.  Inform the information person that you are here for surgery; a wheelchair and escort to [address] will be provided.   An escort to the Adult Surgery Waiting Lounge will be provided.    What can I eat or drink?  -  You may eat and drink normally up to 8 hours prior to arrival time. (Until 1 am)  -  You may have clear liquids until 2 hours prior to arrival time. (Until 7 am)    Examples of clear liquids:  Water  Clear broth  Juices (apple, white grape, white cranberry  and cider) without pulp  Noncarbonated, powder based beverages  (lemonade and Gokul-Aid)  Sodas (Sprite, 7-Up, ginger ale and seltzer)  Coffee or tea (without milk or  cream)  Gatorade    -  No Alcohol or cannabis products for at least 24 hours before surgery.     Which medicines can I take?  Hold Aspirin for 7 days before surgery. Patient reports he is not taking Aspirin.  Hold Multivitamins for 7 days before surgery.  Hold Supplements for 7 days before surgery.  Hold Ibuprofen (Advil, Motrin) for 3 day before surgery--unless otherwise directed by surgeon.  Hold Naproxen (Aleve) for 4 days before surgery.  Acetaminophen (Tylenol) is okay to take if needed.    -  DO NOT take these medications the day of surgery:  Vitamin D3    -  PLEASE TAKE these medications the day of surgery:  Alprazolam (Xanax)  Bupropion (Wellbutrin XL)  Methadone  Nicotine Polacrilex (Nicorette) gum if needed  Olanzapine (Zyprexa) if needed    How do I prepare myself?  - Please take 2 showers (one the night prior to surgery and one the morning of surgery) using Scrubcare or Hibiclens soap.    Use this soap only from the neck to your toes.     Leave the soap on your skin for one minute--then rinse thoroughly.      You may use your own shampoo and conditioner. No other hair products.   - Please remove all jewelry and body piercings.  - No lotions, deodorants or fragrance.  - Bring your ID and insurance card.    -If you have a Deep Brain Stimulator, Spinal Cord Stimulator, or any Neuro Stimulator device---you must bring the remote control to the hospital.      ALL PATIENTS GOING HOME THE SAME DAY OF SURGERY ARE REQUIRED TO HAVE A RESPONSIBLE ADULT TO DRIVE AND BE IN ATTENDANCE WITH THEM FOR 24 HOURS FOLLOWING SURGERY.    Covid testing policy as of 12/06/2022  Your surgeon will notify and schedule you for a COVID test if one is needed before surgery--please direct any questions or COVID symptoms to your surgeon      Questions or Concerns:    - For any questions regarding the day of surgery or your hospital stay, please contact the Pre Admission Nursing Office at [phone].       - If you have health changes  between today and your surgery, please call your surgeon.       - For questions after surgery, please call your surgeons office.           Current Visitor Guidelines    You may have 2 visitors in the pre op area.    Visiting hours: 8 a.m. to 8:30 p.m.    You may have four visitors during your inpatient hospital stay.    Patients confirmed or suspected to have symptoms of COVID 19 or flu:     No visitors allowed for adult patients.   Children (under age 18) can have 1 named visitor.     People who are sick or showing symptoms of COVID 19 or flu:    Are not allowed to visit patients--we can only make exceptions in special situations.       Please follow these guidelines for your visit:          Please maintain social distance          Masking is optional--however at times you may be asked to wear a mask for the safety of yourself and others     Clean your hands with alcohol hand . Do this when you arrive at and leave the building and patient room,    And again after you touch your mask or anything in the room.     Go directly to and from the room you are visiting.     Stay in the patient s room during your visit. Limit going to other places in the hospital as much as possible     Leave bags and jackets at home or in the car.     For everyone s health, please don t come and go during your visit. That includes for smoking   during your visit.

## 2023-09-21 NOTE — PROGRESS NOTES
Called and left message regarding information for smoking cessation---will try to reach pt tomorrow

## 2023-09-22 ENCOUNTER — LAB (OUTPATIENT)
Dept: LAB | Facility: CLINIC | Age: 58
End: 2023-09-22
Payer: COMMERCIAL

## 2023-09-22 DIAGNOSIS — G95.9 CERVICAL MYELOPATHY (H): ICD-10-CM

## 2023-09-22 DIAGNOSIS — Z01.818 PREOP EXAMINATION: ICD-10-CM

## 2023-09-22 LAB
ANION GAP SERPL CALCULATED.3IONS-SCNC: 7 MMOL/L (ref 7–15)
BUN SERPL-MCNC: 13.1 MG/DL (ref 6–20)
CALCIUM SERPL-MCNC: 9.4 MG/DL (ref 8.6–10)
CHLORIDE SERPL-SCNC: 101 MMOL/L (ref 98–107)
CREAT SERPL-MCNC: 0.8 MG/DL (ref 0.67–1.17)
DEPRECATED HCO3 PLAS-SCNC: 31 MMOL/L (ref 22–29)
EGFRCR SERPLBLD CKD-EPI 2021: >90 ML/MIN/1.73M2
ERYTHROCYTE [DISTWIDTH] IN BLOOD BY AUTOMATED COUNT: 14.7 % (ref 10–15)
GLUCOSE SERPL-MCNC: 128 MG/DL (ref 70–99)
HCT VFR BLD AUTO: 43.8 % (ref 40–53)
HGB BLD-MCNC: 13.8 G/DL (ref 13.3–17.7)
MCH RBC QN AUTO: 29 PG (ref 26.5–33)
MCHC RBC AUTO-ENTMCNC: 31.5 G/DL (ref 31.5–36.5)
MCV RBC AUTO: 92 FL (ref 78–100)
PLATELET # BLD AUTO: 161 10E3/UL (ref 150–450)
POTASSIUM SERPL-SCNC: 5.1 MMOL/L (ref 3.4–5.3)
RBC # BLD AUTO: 4.76 10E6/UL (ref 4.4–5.9)
SODIUM SERPL-SCNC: 139 MMOL/L (ref 136–145)
WBC # BLD AUTO: 8.6 10E3/UL (ref 4–11)

## 2023-09-22 PROCEDURE — 36415 COLL VENOUS BLD VENIPUNCTURE: CPT | Performed by: PATHOLOGY

## 2023-09-22 PROCEDURE — 86900 BLOOD TYPING SEROLOGIC ABO: CPT | Performed by: CLINICAL NURSE SPECIALIST

## 2023-09-22 PROCEDURE — 80048 BASIC METABOLIC PNL TOTAL CA: CPT | Performed by: PATHOLOGY

## 2023-09-22 PROCEDURE — 85027 COMPLETE CBC AUTOMATED: CPT | Performed by: PATHOLOGY

## 2023-09-26 ENCOUNTER — ALLIED HEALTH/NURSE VISIT (OUTPATIENT)
Dept: SURGERY | Facility: CLINIC | Age: 58
End: 2023-09-26

## 2023-09-26 ENCOUNTER — TELEPHONE (OUTPATIENT)
Dept: ORTHOPEDICS | Facility: CLINIC | Age: 58
End: 2023-09-26

## 2023-09-26 ENCOUNTER — ANCILLARY PROCEDURE (OUTPATIENT)
Dept: CT IMAGING | Facility: CLINIC | Age: 58
End: 2023-09-26
Attending: ORTHOPAEDIC SURGERY
Payer: COMMERCIAL

## 2023-09-26 DIAGNOSIS — G95.9 CERVICAL MYELOPATHY (H): ICD-10-CM

## 2023-09-26 PROCEDURE — 72125 CT NECK SPINE W/O DYE: CPT | Mod: GC | Performed by: RADIOLOGY

## 2023-09-26 NOTE — TELEPHONE ENCOUNTER
Patient missed his CT appointment ahead of surgery tomorrow. RN scheduled patient this evening at 6:30 for his CT scan so surgery won't be delayed.     Jaqui Goode RN

## 2023-09-26 NOTE — PROGRESS NOTES
Spoke with pt who request call back for smoking cessation counseling after surgery in early October

## 2023-09-26 NOTE — TELEPHONE ENCOUNTER
[facility] Call Center    Phone Message    May a detailed message be left on voicemail: yes     Reason for Call: Other: Patient is requesting a call back TODAY regarding his surgery that is scheduled for tomorrow.     Action Taken: Message routed to:  [facility] ([facility] ortho    Travel Screening: Not Applicable

## 2023-09-27 ENCOUNTER — ANESTHESIA (OUTPATIENT)
Dept: SURGERY | Facility: CLINIC | Age: 58
End: 2023-09-27
Payer: COMMERCIAL

## 2023-09-27 ENCOUNTER — HOSPITAL ENCOUNTER (INPATIENT)
Facility: CLINIC | Age: 58
LOS: 3 days | Discharge: HOME | End: 2023-09-30
Attending: ORTHOPAEDIC SURGERY | Admitting: ORTHOPAEDIC SURGERY
Payer: COMMERCIAL

## 2023-09-27 ENCOUNTER — APPOINTMENT (OUTPATIENT)
Dept: GENERAL RADIOLOGY | Facility: CLINIC | Age: 58
End: 2023-09-27
Attending: ORTHOPAEDIC SURGERY
Payer: COMMERCIAL

## 2023-09-27 DIAGNOSIS — I10 PRIMARY HYPERTENSION: ICD-10-CM

## 2023-09-27 DIAGNOSIS — G95.9 CERVICAL MYELOPATHY (H): Primary | ICD-10-CM

## 2023-09-27 LAB — GLUCOSE BLDC GLUCOMTR-MCNC: 118 MG/DL (ref 70–99)

## 2023-09-27 PROCEDURE — 370N000017 HC ANESTHESIA TECHNICAL FEE, PER MIN: Performed by: ORTHOPAEDIC SURGERY

## 2023-09-27 PROCEDURE — 250N000011 HC RX IP 250 OP 636: Performed by: ORTHOPAEDIC SURGERY

## 2023-09-27 PROCEDURE — 250N000009 HC RX 250

## 2023-09-27 PROCEDURE — 258N000003 HC RX IP 258 OP 636: Performed by: ORTHOPAEDIC SURGERY

## 2023-09-27 PROCEDURE — 250N000011 HC RX IP 250 OP 636: Performed by: PHYSICIAN ASSISTANT

## 2023-09-27 PROCEDURE — 250N000009 HC RX 250: Performed by: PHYSICIAN ASSISTANT

## 2023-09-27 PROCEDURE — 258N000003 HC RX IP 258 OP 636: Performed by: STUDENT IN AN ORGANIZED HEALTH CARE EDUCATION/TRAINING PROGRAM

## 2023-09-27 PROCEDURE — 250N000009 HC RX 250: Performed by: STUDENT IN AN ORGANIZED HEALTH CARE EDUCATION/TRAINING PROGRAM

## 2023-09-27 PROCEDURE — 99222 1ST HOSP IP/OBS MODERATE 55: CPT | Performed by: STUDENT IN AN ORGANIZED HEALTH CARE EDUCATION/TRAINING PROGRAM

## 2023-09-27 PROCEDURE — 0PU30JZ SUPPLEMENT CERVICAL VERTEBRA WITH SYNTHETIC SUBSTITUTE, OPEN APPROACH: ICD-10-PCS | Performed by: ORTHOPAEDIC SURGERY

## 2023-09-27 PROCEDURE — 250N000013 HC RX MED GY IP 250 OP 250 PS 637: Performed by: STUDENT IN AN ORGANIZED HEALTH CARE EDUCATION/TRAINING PROGRAM

## 2023-09-27 PROCEDURE — 00NW0ZZ RELEASE CERVICAL SPINAL CORD, OPEN APPROACH: ICD-10-PCS | Performed by: ORTHOPAEDIC SURGERY

## 2023-09-27 PROCEDURE — 258N000003 HC RX IP 258 OP 636

## 2023-09-27 PROCEDURE — 272N000001 HC OR GENERAL SUPPLY STERILE: Performed by: ORTHOPAEDIC SURGERY

## 2023-09-27 PROCEDURE — 250N000009 HC RX 250: Performed by: ORTHOPAEDIC SURGERY

## 2023-09-27 PROCEDURE — 999N000180 XR SURGERY CARM FLUORO LESS THAN 5 MIN: Mod: TC

## 2023-09-27 PROCEDURE — 272N000002 HC OR SUPPLY OTHER OPNP: Performed by: ORTHOPAEDIC SURGERY

## 2023-09-27 PROCEDURE — 710N000010 HC RECOVERY PHASE 1, LEVEL 2, PER MIN: Performed by: ORTHOPAEDIC SURGERY

## 2023-09-27 PROCEDURE — 258N000003 HC RX IP 258 OP 636: Performed by: PHYSICIAN ASSISTANT

## 2023-09-27 PROCEDURE — C1713 ANCHOR/SCREW BN/BN,TIS/BN: HCPCS | Performed by: ORTHOPAEDIC SURGERY

## 2023-09-27 PROCEDURE — 120N000002 HC R&B MED SURG/OB UMMC

## 2023-09-27 PROCEDURE — 250N000011 HC RX IP 250 OP 636: Mod: JZ | Performed by: STUDENT IN AN ORGANIZED HEALTH CARE EDUCATION/TRAINING PROGRAM

## 2023-09-27 PROCEDURE — 250N000011 HC RX IP 250 OP 636

## 2023-09-27 PROCEDURE — 250N000013 HC RX MED GY IP 250 OP 250 PS 637: Performed by: ORTHOPAEDIC SURGERY

## 2023-09-27 PROCEDURE — 250N000011 HC RX IP 250 OP 636: Performed by: STUDENT IN AN ORGANIZED HEALTH CARE EDUCATION/TRAINING PROGRAM

## 2023-09-27 PROCEDURE — 999N000141 HC STATISTIC PRE-PROCEDURE NURSING ASSESSMENT: Performed by: ORTHOPAEDIC SURGERY

## 2023-09-27 PROCEDURE — 250N000011 HC RX IP 250 OP 636: Mod: JZ

## 2023-09-27 PROCEDURE — 360N000084 HC SURGERY LEVEL 4 W/ FLUORO, PER MIN: Performed by: ORTHOPAEDIC SURGERY

## 2023-09-27 PROCEDURE — 250N000025 HC SEVOFLURANE, PER MIN: Performed by: ORTHOPAEDIC SURGERY

## 2023-09-27 DEVICE — IMPLANTABLE DEVICE: Type: IMPLANTABLE DEVICE | Site: SPINE CERVICAL | Status: FUNCTIONAL

## 2023-09-27 RX ORDER — BISACODYL 10 MG
10 SUPPOSITORY, RECTAL RECTAL DAILY PRN
Status: DISCONTINUED | OUTPATIENT
Start: 2023-09-27 | End: 2023-09-30 | Stop reason: HOSPADM

## 2023-09-27 RX ORDER — ONDANSETRON 4 MG/1
4 TABLET, ORALLY DISINTEGRATING ORAL EVERY 6 HOURS PRN
Status: DISCONTINUED | OUTPATIENT
Start: 2023-09-27 | End: 2023-09-30 | Stop reason: HOSPADM

## 2023-09-27 RX ORDER — LIDOCAINE 40 MG/G
CREAM TOPICAL
Status: DISCONTINUED | OUTPATIENT
Start: 2023-09-27 | End: 2023-09-27 | Stop reason: HOSPADM

## 2023-09-27 RX ORDER — HYDRALAZINE HYDROCHLORIDE 20 MG/ML
2.5-5 INJECTION INTRAMUSCULAR; INTRAVENOUS EVERY 10 MIN PRN
Status: DISCONTINUED | OUTPATIENT
Start: 2023-09-27 | End: 2023-09-27 | Stop reason: HOSPADM

## 2023-09-27 RX ORDER — ONDANSETRON 2 MG/ML
4 INJECTION INTRAMUSCULAR; INTRAVENOUS EVERY 6 HOURS PRN
Status: DISCONTINUED | OUTPATIENT
Start: 2023-09-27 | End: 2023-09-27

## 2023-09-27 RX ORDER — AMOXICILLIN 250 MG
1 CAPSULE ORAL 2 TIMES DAILY
Status: DISCONTINUED | OUTPATIENT
Start: 2023-09-27 | End: 2023-09-27

## 2023-09-27 RX ORDER — AMOXICILLIN 250 MG
2 CAPSULE ORAL 2 TIMES DAILY
Status: DISCONTINUED | OUTPATIENT
Start: 2023-09-27 | End: 2023-09-30 | Stop reason: HOSPADM

## 2023-09-27 RX ORDER — METHOCARBAMOL 750 MG/1
750 TABLET, FILM COATED ORAL EVERY 6 HOURS PRN
Status: DISCONTINUED | OUTPATIENT
Start: 2023-09-27 | End: 2023-09-28

## 2023-09-27 RX ORDER — LIDOCAINE HYDROCHLORIDE ANHYDROUS AND DEXTROSE MONOHYDRATE .8; 5 G/100ML; G/100ML
1 INJECTION, SOLUTION INTRAVENOUS CONTINUOUS
Status: DISCONTINUED | OUTPATIENT
Start: 2023-09-27 | End: 2023-09-29

## 2023-09-27 RX ORDER — ONDANSETRON 2 MG/ML
4 INJECTION INTRAMUSCULAR; INTRAVENOUS EVERY 6 HOURS PRN
Status: DISCONTINUED | OUTPATIENT
Start: 2023-09-27 | End: 2023-09-30 | Stop reason: HOSPADM

## 2023-09-27 RX ORDER — PROCHLORPERAZINE 25 MG
25 SUPPOSITORY, RECTAL RECTAL EVERY 12 HOURS PRN
Status: DISCONTINUED | OUTPATIENT
Start: 2023-09-27 | End: 2023-09-30 | Stop reason: HOSPADM

## 2023-09-27 RX ORDER — SODIUM CHLORIDE, SODIUM LACTATE, POTASSIUM CHLORIDE, CALCIUM CHLORIDE 600; 310; 30; 20 MG/100ML; MG/100ML; MG/100ML; MG/100ML
INJECTION, SOLUTION INTRAVENOUS CONTINUOUS PRN
Status: DISCONTINUED | OUTPATIENT
Start: 2023-09-27 | End: 2023-09-27

## 2023-09-27 RX ORDER — ONDANSETRON 2 MG/ML
4 INJECTION INTRAMUSCULAR; INTRAVENOUS EVERY 30 MIN PRN
Status: DISCONTINUED | OUTPATIENT
Start: 2023-09-27 | End: 2023-09-27 | Stop reason: HOSPADM

## 2023-09-27 RX ORDER — LABETALOL HYDROCHLORIDE 5 MG/ML
10 INJECTION, SOLUTION INTRAVENOUS
Status: DISCONTINUED | OUTPATIENT
Start: 2023-09-27 | End: 2023-09-27 | Stop reason: HOSPADM

## 2023-09-27 RX ORDER — TOBRAMYCIN 1.2 G/30ML
INJECTION, POWDER, LYOPHILIZED, FOR SOLUTION INTRAVENOUS PRN
Status: DISCONTINUED | OUTPATIENT
Start: 2023-09-27 | End: 2023-09-27 | Stop reason: HOSPADM

## 2023-09-27 RX ORDER — POLYETHYLENE GLYCOL 3350 17 G
2 POWDER IN PACKET (EA) ORAL
Status: DISCONTINUED | OUTPATIENT
Start: 2023-09-27 | End: 2023-09-30 | Stop reason: HOSPADM

## 2023-09-27 RX ORDER — FENTANYL CITRATE 50 UG/ML
50 INJECTION, SOLUTION INTRAMUSCULAR; INTRAVENOUS EVERY 5 MIN PRN
Status: DISCONTINUED | OUTPATIENT
Start: 2023-09-27 | End: 2023-09-27 | Stop reason: HOSPADM

## 2023-09-27 RX ORDER — METHADONE HYDROCHLORIDE 10 MG/ML
40 CONCENTRATE ORAL ONCE
Status: COMPLETED | OUTPATIENT
Start: 2023-09-27 | End: 2023-09-27

## 2023-09-27 RX ORDER — AMOXICILLIN 250 MG
1 CAPSULE ORAL 2 TIMES DAILY
Status: DISCONTINUED | OUTPATIENT
Start: 2023-09-27 | End: 2023-09-30 | Stop reason: HOSPADM

## 2023-09-27 RX ORDER — ALPRAZOLAM 1 MG
1 TABLET ORAL 3 TIMES DAILY PRN
Status: DISCONTINUED | OUTPATIENT
Start: 2023-09-27 | End: 2023-09-30 | Stop reason: HOSPADM

## 2023-09-27 RX ORDER — NALOXONE HYDROCHLORIDE 0.4 MG/ML
0.4 INJECTION, SOLUTION INTRAMUSCULAR; INTRAVENOUS; SUBCUTANEOUS
Status: DISCONTINUED | OUTPATIENT
Start: 2023-09-27 | End: 2023-09-30 | Stop reason: HOSPADM

## 2023-09-27 RX ORDER — POLYETHYLENE GLYCOL 3350 17 G/17G
17 POWDER, FOR SOLUTION ORAL DAILY
Status: DISCONTINUED | OUTPATIENT
Start: 2023-09-27 | End: 2023-09-27

## 2023-09-27 RX ORDER — CEFAZOLIN SODIUM/WATER 2 G/20 ML
2 SYRINGE (ML) INTRAVENOUS SEE ADMIN INSTRUCTIONS
Status: DISCONTINUED | OUTPATIENT
Start: 2023-09-27 | End: 2023-09-27 | Stop reason: HOSPADM

## 2023-09-27 RX ORDER — OLANZAPINE 20 MG/1
20 TABLET ORAL AT BEDTIME
Status: DISCONTINUED | OUTPATIENT
Start: 2023-09-27 | End: 2023-09-30 | Stop reason: HOSPADM

## 2023-09-27 RX ORDER — CEFAZOLIN SODIUM/WATER 2 G/20 ML
2 SYRINGE (ML) INTRAVENOUS
Status: COMPLETED | OUTPATIENT
Start: 2023-09-27 | End: 2023-09-27

## 2023-09-27 RX ORDER — HYDROMORPHONE HYDROCHLORIDE 1 MG/ML
0.4 INJECTION, SOLUTION INTRAMUSCULAR; INTRAVENOUS; SUBCUTANEOUS
Status: DISCONTINUED | OUTPATIENT
Start: 2023-09-27 | End: 2023-09-29

## 2023-09-27 RX ORDER — SODIUM CHLORIDE, SODIUM LACTATE, POTASSIUM CHLORIDE, CALCIUM CHLORIDE 600; 310; 30; 20 MG/100ML; MG/100ML; MG/100ML; MG/100ML
INJECTION, SOLUTION INTRAVENOUS CONTINUOUS
Status: DISCONTINUED | OUTPATIENT
Start: 2023-09-27 | End: 2023-09-27 | Stop reason: HOSPADM

## 2023-09-27 RX ORDER — PROCHLORPERAZINE MALEATE 10 MG
10 TABLET ORAL EVERY 6 HOURS PRN
Status: DISCONTINUED | OUTPATIENT
Start: 2023-09-27 | End: 2023-09-27

## 2023-09-27 RX ORDER — ESMOLOL HYDROCHLORIDE 10 MG/ML
INJECTION INTRAVENOUS PRN
Status: DISCONTINUED | OUTPATIENT
Start: 2023-09-27 | End: 2023-09-27

## 2023-09-27 RX ORDER — BUPROPION HYDROCHLORIDE 150 MG/1
150 TABLET ORAL EVERY MORNING
Status: DISCONTINUED | OUTPATIENT
Start: 2023-09-28 | End: 2023-09-30 | Stop reason: HOSPADM

## 2023-09-27 RX ORDER — ACETAMINOPHEN 325 MG/1
975 TABLET ORAL EVERY 8 HOURS
Status: DISCONTINUED | OUTPATIENT
Start: 2023-09-27 | End: 2023-09-30 | Stop reason: HOSPADM

## 2023-09-27 RX ORDER — BUPIVACAINE HYDROCHLORIDE AND EPINEPHRINE 2.5; 5 MG/ML; UG/ML
INJECTION, SOLUTION INFILTRATION; PERINEURAL PRN
Status: DISCONTINUED | OUTPATIENT
Start: 2023-09-27 | End: 2023-09-27 | Stop reason: HOSPADM

## 2023-09-27 RX ORDER — MAGNESIUM SULFATE HEPTAHYDRATE 40 MG/ML
2 INJECTION, SOLUTION INTRAVENOUS ONCE
Status: COMPLETED | OUTPATIENT
Start: 2023-09-27 | End: 2023-09-27

## 2023-09-27 RX ORDER — IPRATROPIUM BROMIDE AND ALBUTEROL SULFATE 2.5; .5 MG/3ML; MG/3ML
3 SOLUTION RESPIRATORY (INHALATION) ONCE
Status: COMPLETED | OUTPATIENT
Start: 2023-09-27 | End: 2023-09-27

## 2023-09-27 RX ORDER — ONDANSETRON 4 MG/1
4 TABLET, ORALLY DISINTEGRATING ORAL EVERY 30 MIN PRN
Status: DISCONTINUED | OUTPATIENT
Start: 2023-09-27 | End: 2023-09-27 | Stop reason: HOSPADM

## 2023-09-27 RX ORDER — ONDANSETRON 4 MG/1
4 TABLET, ORALLY DISINTEGRATING ORAL EVERY 6 HOURS PRN
Status: DISCONTINUED | OUTPATIENT
Start: 2023-09-27 | End: 2023-09-27

## 2023-09-27 RX ORDER — VANCOMYCIN HYDROCHLORIDE 1 G/20ML
INJECTION, POWDER, LYOPHILIZED, FOR SOLUTION INTRAVENOUS PRN
Status: DISCONTINUED | OUTPATIENT
Start: 2023-09-27 | End: 2023-09-27 | Stop reason: HOSPADM

## 2023-09-27 RX ORDER — PROPOFOL 10 MG/ML
INJECTION, EMULSION INTRAVENOUS PRN
Status: DISCONTINUED | OUTPATIENT
Start: 2023-09-27 | End: 2023-09-27

## 2023-09-27 RX ORDER — FENTANYL CITRATE 50 UG/ML
INJECTION, SOLUTION INTRAMUSCULAR; INTRAVENOUS PRN
Status: DISCONTINUED | OUTPATIENT
Start: 2023-09-27 | End: 2023-09-27

## 2023-09-27 RX ORDER — ACETAMINOPHEN 325 MG/1
650 TABLET ORAL EVERY 4 HOURS PRN
Status: DISCONTINUED | OUTPATIENT
Start: 2023-09-30 | End: 2023-09-30 | Stop reason: HOSPADM

## 2023-09-27 RX ORDER — OXYCODONE HYDROCHLORIDE 10 MG/1
10 TABLET ORAL EVERY 4 HOURS PRN
Status: DISCONTINUED | OUTPATIENT
Start: 2023-09-27 | End: 2023-09-28

## 2023-09-27 RX ORDER — HYDROMORPHONE HYDROCHLORIDE 1 MG/ML
0.2 INJECTION, SOLUTION INTRAMUSCULAR; INTRAVENOUS; SUBCUTANEOUS
Status: DISCONTINUED | OUTPATIENT
Start: 2023-09-27 | End: 2023-09-29

## 2023-09-27 RX ORDER — PROCHLORPERAZINE MALEATE 10 MG
10 TABLET ORAL EVERY 6 HOURS PRN
Status: DISCONTINUED | OUTPATIENT
Start: 2023-09-27 | End: 2023-09-30 | Stop reason: HOSPADM

## 2023-09-27 RX ORDER — ACETAMINOPHEN 325 MG/1
975 TABLET ORAL ONCE
Status: COMPLETED | OUTPATIENT
Start: 2023-09-27 | End: 2023-09-27

## 2023-09-27 RX ORDER — LIDOCAINE 40 MG/G
CREAM TOPICAL
Status: DISCONTINUED | OUTPATIENT
Start: 2023-09-27 | End: 2023-09-30 | Stop reason: HOSPADM

## 2023-09-27 RX ORDER — HYDROMORPHONE HYDROCHLORIDE 1 MG/ML
0.4 INJECTION, SOLUTION INTRAMUSCULAR; INTRAVENOUS; SUBCUTANEOUS EVERY 5 MIN PRN
Status: DISCONTINUED | OUTPATIENT
Start: 2023-09-27 | End: 2023-09-27 | Stop reason: HOSPADM

## 2023-09-27 RX ORDER — POLYETHYLENE GLYCOL 3350 17 G/17G
17 POWDER, FOR SOLUTION ORAL DAILY
Status: DISCONTINUED | OUTPATIENT
Start: 2023-09-28 | End: 2023-09-30 | Stop reason: HOSPADM

## 2023-09-27 RX ORDER — NALOXONE HYDROCHLORIDE 0.4 MG/ML
0.2 INJECTION, SOLUTION INTRAMUSCULAR; INTRAVENOUS; SUBCUTANEOUS
Status: DISCONTINUED | OUTPATIENT
Start: 2023-09-27 | End: 2023-09-30 | Stop reason: HOSPADM

## 2023-09-27 RX ORDER — FAMOTIDINE 20 MG/1
20 TABLET, FILM COATED ORAL 2 TIMES DAILY
Status: DISCONTINUED | OUTPATIENT
Start: 2023-09-27 | End: 2023-09-30 | Stop reason: HOSPADM

## 2023-09-27 RX ORDER — PROPOFOL 10 MG/ML
INJECTION, EMULSION INTRAVENOUS CONTINUOUS PRN
Status: DISCONTINUED | OUTPATIENT
Start: 2023-09-27 | End: 2023-09-27

## 2023-09-27 RX ORDER — HALOPERIDOL 5 MG/ML
1 INJECTION INTRAMUSCULAR
Status: DISCONTINUED | OUTPATIENT
Start: 2023-09-27 | End: 2023-09-27 | Stop reason: HOSPADM

## 2023-09-27 RX ORDER — FENTANYL CITRATE 50 UG/ML
25 INJECTION, SOLUTION INTRAMUSCULAR; INTRAVENOUS EVERY 5 MIN PRN
Status: DISCONTINUED | OUTPATIENT
Start: 2023-09-27 | End: 2023-09-27 | Stop reason: HOSPADM

## 2023-09-27 RX ORDER — CEFAZOLIN SODIUM 1 G/3ML
1 INJECTION, POWDER, FOR SOLUTION INTRAMUSCULAR; INTRAVENOUS EVERY 8 HOURS
Status: DISCONTINUED | OUTPATIENT
Start: 2023-09-27 | End: 2023-09-30 | Stop reason: HOSPADM

## 2023-09-27 RX ORDER — DIMENHYDRINATE 50 MG/ML
25 INJECTION, SOLUTION INTRAMUSCULAR; INTRAVENOUS
Status: DISCONTINUED | OUTPATIENT
Start: 2023-09-27 | End: 2023-09-27 | Stop reason: HOSPADM

## 2023-09-27 RX ORDER — LIDOCAINE HYDROCHLORIDE ANHYDROUS AND DEXTROSE MONOHYDRATE .8; 5 G/100ML; G/100ML
1 INJECTION, SOLUTION INTRAVENOUS CONTINUOUS
Status: DISCONTINUED | OUTPATIENT
Start: 2023-09-27 | End: 2023-09-27 | Stop reason: HOSPADM

## 2023-09-27 RX ORDER — HYDROMORPHONE HYDROCHLORIDE 1 MG/ML
0.2 INJECTION, SOLUTION INTRAMUSCULAR; INTRAVENOUS; SUBCUTANEOUS EVERY 5 MIN PRN
Status: DISCONTINUED | OUTPATIENT
Start: 2023-09-27 | End: 2023-09-27 | Stop reason: HOSPADM

## 2023-09-27 RX ORDER — SODIUM CHLORIDE 9 MG/ML
INJECTION, SOLUTION INTRAVENOUS CONTINUOUS
Status: DISCONTINUED | OUTPATIENT
Start: 2023-09-27 | End: 2023-09-30 | Stop reason: HOSPADM

## 2023-09-27 RX ORDER — GABAPENTIN 300 MG/1
300 CAPSULE ORAL
Status: COMPLETED | OUTPATIENT
Start: 2023-09-27 | End: 2023-09-27

## 2023-09-27 RX ORDER — DEXAMETHASONE SODIUM PHOSPHATE 4 MG/ML
INJECTION, SOLUTION INTRA-ARTICULAR; INTRALESIONAL; INTRAMUSCULAR; INTRAVENOUS; SOFT TISSUE PRN
Status: DISCONTINUED | OUTPATIENT
Start: 2023-09-27 | End: 2023-09-27

## 2023-09-27 RX ORDER — ONDANSETRON 2 MG/ML
INJECTION INTRAMUSCULAR; INTRAVENOUS PRN
Status: DISCONTINUED | OUTPATIENT
Start: 2023-09-27 | End: 2023-09-27

## 2023-09-27 RX ORDER — ACETAMINOPHEN 325 MG/1
975 TABLET ORAL ONCE
Status: DISCONTINUED | OUTPATIENT
Start: 2023-09-27 | End: 2023-09-27 | Stop reason: HOSPADM

## 2023-09-27 RX ORDER — LIDOCAINE HYDROCHLORIDE 20 MG/ML
INJECTION, SOLUTION INFILTRATION; PERINEURAL PRN
Status: DISCONTINUED | OUTPATIENT
Start: 2023-09-27 | End: 2023-09-27

## 2023-09-27 RX ADMIN — OXYCODONE HYDROCHLORIDE 15 MG: 5 TABLET ORAL at 20:12

## 2023-09-27 RX ADMIN — SENNOSIDES AND DOCUSATE SODIUM 2 TABLET: 50; 8.6 TABLET ORAL at 20:12

## 2023-09-27 RX ADMIN — HYDROMORPHONE HYDROCHLORIDE 0.5 MG: 1 INJECTION, SOLUTION INTRAMUSCULAR; INTRAVENOUS; SUBCUTANEOUS at 13:24

## 2023-09-27 RX ADMIN — SODIUM CHLORIDE, POTASSIUM CHLORIDE, SODIUM LACTATE AND CALCIUM CHLORIDE: 600; 310; 30; 20 INJECTION, SOLUTION INTRAVENOUS at 12:18

## 2023-09-27 RX ADMIN — METHADONE HYDROCHLORIDE 40 MG: 10 CONCENTRATE ORAL at 22:32

## 2023-09-27 RX ADMIN — PROPOFOL 50 MCG/KG/MIN: 10 INJECTION, EMULSION INTRAVENOUS at 13:22

## 2023-09-27 RX ADMIN — FENTANYL CITRATE 100 MCG: 50 INJECTION, SOLUTION INTRAMUSCULAR; INTRAVENOUS at 12:40

## 2023-09-27 RX ADMIN — IPRATROPIUM BROMIDE AND ALBUTEROL SULFATE 3 ML: 2.5; .5 SOLUTION RESPIRATORY (INHALATION) at 12:05

## 2023-09-27 RX ADMIN — DEXMEDETOMIDINE HYDROCHLORIDE 20 MCG: 100 INJECTION, SOLUTION INTRAVENOUS at 13:36

## 2023-09-27 RX ADMIN — Medication 20 MG: at 14:15

## 2023-09-27 RX ADMIN — MAGNESIUM SULFATE HEPTAHYDRATE 2 G: 40 INJECTION, SOLUTION INTRAVENOUS at 13:29

## 2023-09-27 RX ADMIN — DEXMEDETOMIDINE HYDROCHLORIDE 20 MCG: 100 INJECTION, SOLUTION INTRAVENOUS at 15:35

## 2023-09-27 RX ADMIN — FENTANYL CITRATE 50 MCG: 50 INJECTION INTRAMUSCULAR; INTRAVENOUS at 16:25

## 2023-09-27 RX ADMIN — FENTANYL CITRATE 100 MCG: 50 INJECTION, SOLUTION INTRAMUSCULAR; INTRAVENOUS at 12:29

## 2023-09-27 RX ADMIN — FAMOTIDINE 20 MG: 20 TABLET ORAL at 20:12

## 2023-09-27 RX ADMIN — LIDOCAINE HYDROCHLORIDE 1 MG/KG/HR: 8 INJECTION, SOLUTION INTRAVENOUS at 13:09

## 2023-09-27 RX ADMIN — TRANEXAMIC ACID 10 MG/KG/HR: 100 INJECTION INTRAVENOUS at 13:08

## 2023-09-27 RX ADMIN — ACETAMINOPHEN 975 MG: 325 TABLET, FILM COATED ORAL at 20:11

## 2023-09-27 RX ADMIN — SODIUM CHLORIDE, POTASSIUM CHLORIDE, SODIUM LACTATE AND CALCIUM CHLORIDE: 600; 310; 30; 20 INJECTION, SOLUTION INTRAVENOUS at 14:44

## 2023-09-27 RX ADMIN — SODIUM CHLORIDE: 9 INJECTION, SOLUTION INTRAVENOUS at 22:33

## 2023-09-27 RX ADMIN — MIDAZOLAM 2 MG: 1 INJECTION INTRAMUSCULAR; INTRAVENOUS at 12:38

## 2023-09-27 RX ADMIN — METHOCARBAMOL TABLETS 750 MG: 750 TABLET, COATED ORAL at 20:39

## 2023-09-27 RX ADMIN — Medication 2 G: at 12:33

## 2023-09-27 RX ADMIN — FENTANYL CITRATE 50 MCG: 50 INJECTION INTRAMUSCULAR; INTRAVENOUS at 16:00

## 2023-09-27 RX ADMIN — TRANEXAMIC ACID 2361 MG: 100 INJECTION INTRAVENOUS at 12:58

## 2023-09-27 RX ADMIN — ACETAMINOPHEN 975 MG: 325 TABLET, FILM COATED ORAL at 12:04

## 2023-09-27 RX ADMIN — LIDOCAINE HYDROCHLORIDE 100 MG: 20 INJECTION, SOLUTION INFILTRATION; PERINEURAL at 12:30

## 2023-09-27 RX ADMIN — FENTANYL CITRATE 50 MCG: 50 INJECTION INTRAMUSCULAR; INTRAVENOUS at 16:13

## 2023-09-27 RX ADMIN — Medication 50 MG: at 12:34

## 2023-09-27 RX ADMIN — Medication 30 MG: at 13:13

## 2023-09-27 RX ADMIN — OLANZAPINE 20 MG: 20 TABLET, FILM COATED ORAL at 21:50

## 2023-09-27 RX ADMIN — PHENYLEPHRINE HYDROCHLORIDE 0.4 MCG/KG/MIN: 10 INJECTION INTRAVENOUS at 13:44

## 2023-09-27 RX ADMIN — ONDANSETRON 4 MG: 2 INJECTION INTRAMUSCULAR; INTRAVENOUS at 14:45

## 2023-09-27 RX ADMIN — FENTANYL CITRATE 50 MCG: 50 INJECTION INTRAMUSCULAR; INTRAVENOUS at 15:49

## 2023-09-27 RX ADMIN — PHENYLEPHRINE HYDROCHLORIDE 100 MCG: 10 INJECTION INTRAVENOUS at 13:43

## 2023-09-27 RX ADMIN — ESMOLOL HYDROCHLORIDE 30 MG: 10 INJECTION, SOLUTION INTRAVENOUS at 13:01

## 2023-09-27 RX ADMIN — GABAPENTIN 300 MG: 300 CAPSULE ORAL at 12:04

## 2023-09-27 RX ADMIN — MIDAZOLAM 2 MG: 1 INJECTION INTRAMUSCULAR; INTRAVENOUS at 12:18

## 2023-09-27 RX ADMIN — CEFAZOLIN 1 G: 1 INJECTION, POWDER, FOR SOLUTION INTRAMUSCULAR; INTRAVENOUS at 20:10

## 2023-09-27 RX ADMIN — PROPOFOL 100 MG: 10 INJECTION, EMULSION INTRAVENOUS at 12:35

## 2023-09-27 RX ADMIN — DEXAMETHASONE SODIUM PHOSPHATE 10 MG: 4 INJECTION, SOLUTION INTRA-ARTICULAR; INTRALESIONAL; INTRAMUSCULAR; INTRAVENOUS; SOFT TISSUE at 13:16

## 2023-09-27 RX ADMIN — SUGAMMADEX 200 MG: 100 INJECTION, SOLUTION INTRAVENOUS at 15:31

## 2023-09-27 RX ADMIN — PROPOFOL 200 MG: 10 INJECTION, EMULSION INTRAVENOUS at 12:30

## 2023-09-27 RX ADMIN — SODIUM CHLORIDE, POTASSIUM CHLORIDE, SODIUM LACTATE AND CALCIUM CHLORIDE: 600; 310; 30; 20 INJECTION, SOLUTION INTRAVENOUS at 13:06

## 2023-09-27 RX ADMIN — Medication 20 MG: at 13:19

## 2023-09-27 RX ADMIN — PHENYLEPHRINE HYDROCHLORIDE 100 MCG: 10 INJECTION INTRAVENOUS at 12:32

## 2023-09-27 RX ADMIN — PROPOFOL 50 MG: 10 INJECTION, EMULSION INTRAVENOUS at 13:13

## 2023-09-27 RX ADMIN — PHENYLEPHRINE HYDROCHLORIDE 100 MCG: 10 INJECTION INTRAVENOUS at 14:54

## 2023-09-27 RX ADMIN — Medication: at 16:34

## 2023-09-27 ASSESSMENT — ACTIVITIES OF DAILY LIVING (ADL)
ADLS_ACUITY_SCORE: 27

## 2023-09-27 ASSESSMENT — COPD QUESTIONNAIRES: COPD: 0

## 2023-09-27 ASSESSMENT — LIFESTYLE VARIABLES: TOBACCO_USE: 1

## 2023-09-27 ASSESSMENT — ENCOUNTER SYMPTOMS: ORTHOPNEA: 0

## 2023-09-27 NOTE — OP NOTE
DATE OF SURGERY: 9/27/2023    PREOPERATIVE DIAGNOSIS:   Cervical spondylosis  Cervical myelopathy            POSTOPERATIVE DIAGNOSIS: Same    PROCEDURES:  1. C2 and C7 dome laminectomies for decompression of the spinal cord.  2. C3-C6 open door laminoplasty with segmental Medtronic Ti-mesh plate fixation.    Primary Surgeon: Anirudh Sullivan MD    FIRST ASSISTANT: Kumar Ahumada, PGY6    ANESTHESIA: General Endotracheal    COMPLICATIONS:  None.    SPECIMENS: None.    ESTIMATED BLOOD LOSS: 50 mL    INDICATIONS:                          Salas Macias is a 58 year old male with significant neurologic compression in the cervical spine.  The patient elected surgical treatment, and understood the indications  for this surgery, as well as its risks, benefits, and alternatives. We reviewed the risks and benefits of the surgery in detail. The risks include, but are not limited to, the general risks associated with anesthesia, including death, pulmonary embolism, DVT, stroke, myocardial infarction, pneumonia, and urinary tract infection. Additional risks specific to the surgery include the risk of infection, failure to heal (non-union), dural tear with resultant CSF leak which might necessitate placement of a drain or revision surgery or could result in headaches, nerve injury resulting in weakness or paralysis, risk of adjacent segment disease, the risks of vascular injury, need for revision surgery in the future due to one of the above issues, or risk of incomplete symptom relief. Salas Macias understands the risks of the surgery and wishes to proceed.  No guarantees were given.     DESCRIPTION OF PROCEDURE:           Salas Macias was taken to the operating  room, where the Anesthesiology Service induced satisfactory general anesthesia.  Ancef was given IV.  Venous thromboembolic prophylaxis  was performed with sequential devices placed on the calves bilaterally.  Dunn catheter was placed under standard  sterile techniques.  The patient had Greenfield tongs placed according to standard techniques.  I then carefully log rolled the patient into the prone  position onto longitudinal bolsters.  The neck was placed into a neutral  alignment.  All bony prominences were well padded.  The Greenfield was attached  to the bed frame.  The posterior neck was then prepped and draped in its  entirety in the usual sterile fashion. We then held a multidisciplinary time out in which we verified the patient, procedure, antibiotics, and operative plan.  All team members were in agreement.    I asked the Anesthesia Service to maintain a mean arterial pressure of 85 in order to maximize cord perfusion.  This was emphasized throughout the case.    We took a midline longitudinal approach and performed subperiosteal dissection  out to the lateral mass laminar junction from C2 down to C7.  Intraoperative radiographic localization of levels was confirmed.      I performed a dome laminectomy at C2. The caudal half of the lamina was removed with a rongeur.  I then thinned the lamina using a 4mm round high speed bur down to the level of the ligamenm flavum.  A currette was used to identify the inferior edge of the lamina and to split the ligamentum flavum.  A 2mm kerrison was then used to remove the flavum and the inferior half of the remaining lamina.  This was carried out to the level of the facets bilaterally.  I then performed a dome laminectomy at C7 in the same fashion. The cephalad half of the lamina was removed with a rongeur.  I then thinned the lamina using a high speed bur down to the level of the ligamenm flavum.  A currette was used to identify the superior edge of the lamina and to split the ligamentum flavum.  A 2mm kerrison was then used to remove the flavum and the superior half of the remaining lamina.  This was carried out to the level of the facets bilaterally.    We then began laminoplasty.  On the left  side at the lateral  mass laminar junction, a bur was used to drill both the dorsal and the ventral cortices  until there was just a thin flake of ventral bone that was removed with a combination of a microcurette and kerrison.  This was done sequentially from C3 to C6.     We then went about creating our trough.  On the right  side, again at the lateral mass laminar junction, the bur was used to drill only the dorsal cortex leaving the ventral intact.  This was performed at the same levels as noted above. Segmental greenstick fractures were then carefully performed elevating the cut edge of  the lamina and placing tension on the ligamentum flavum.  The flavum was then  excised in its entirety using a 2mm kerrison.  Having done this, excellent dural expansion was noted. Medtronic laminoplasty ti-mesh plate fixation was then applied to each level.  Two screws were placed into the lateral mass and 1 or 2  screws as needed into the cut edge of the lamina.  The laminoplasty had  excellent fixation and excellent dural expansion was noted. At this point, I saw no further evidence of dorsal neurologic compression.    We irrigated the wound, achieved hemostasis, and then routinely closed the incision over multiple layers of interrupted Vicryl, a deep Hemovac drain and 1 g of vancomycin powder.  Monocryl and dermabond were used for the skin.  Upon closure, the patient was then transferred to a hospital bed, and taken to recovery after extubation in  stable condition. Spinal cord monitoring data remained stable throughout.  Final x-rays demonstrated excellent position of the spine and all of the implants.    IAnirudh MD was present and scrubbed for the critical portions of the procedure including: Positioning of the patient, the exposure, decompression and creation of the laminoplasty, placement of instrumentation, and evaluation of final imaging.

## 2023-09-27 NOTE — ANESTHESIA PROCEDURE NOTES
Airway       Patient location during procedure: OR       Procedure Start/Stop Times: 9/27/2023 12:43 PM  Staff -        Anesthesiologist:  Shital Mcmanus MD       CRNA: Bonnie Dumont APRN CRNA       Performed By: CRNA and anesthesiologistIndications and Patient Condition       Indications for airway management: teri-procedural       Induction type:intravenous       Mask difficulty assessment: 2 - vent by mask + OA or adjuvant +/- NMBA    Final Airway Details       Final airway type: endotracheal airway       Successful airway: ETT - single and Oral  Endotracheal Airway Details        ETT size (mm): 8.0       Cuffed: yes       Inital cuff pressure (cm H2O): 25       Cuff volume (mL): 15       Successful intubation technique: video laryngoscopy       VL Blade Size: MAC D Blade       Grade View of Cords: 1       Adjucts: stylet       Position: Right       Measured from: gums/teeth       Secured at (cm): 21       Bite block used: None    Post intubation assessment        Placement verified by: capnometry, equal breath sounds and chest rise        Number of attempts at approach: 1       Number of other approaches attempted: 0       Secured with: silk tape       Ease of procedure: easy       Dentition: Intact and Unchanged    Medication(s) Administered   Medication Administration Time: 9/27/2023 12:43 PM    Additional Comments       Patient positioned self to comfort prior to induction. No neck manipulation with induction. Neck maintained in neutral position.

## 2023-09-27 NOTE — PROGRESS NOTES
PACU to Inpatient Nursing Handoff    Patient Salas Macias is a 58 year old male who speaks English.   Procedure Procedure(s):  Cervical 2 and 7 Dome Laminectomies and C3-6 Laminoplasty with Medtronic Implants   Surgeon(s) Primary: Anirudh Sullivan MD  Fellow - Assisting: Ranjeet Ahumada MD     No Known Allergies    Isolation  No active isolations     Past Medical History   has a past medical history of Anxiety, Cervical myelopathy, Depression, HLD (hyperlipidemia), and Paranoid schizophrenia (H).    Anesthesia General   Dermatome Level     Preop Meds acetaminophen (Tylenol) - time given: 1204  gabapentin (Neurontin) - time given: 1204   Nerve block Not applicable   Intraop Meds midazolam 1 mg/mL (mg)   Total dose: 4 mg  Date/Time Rate/Dose/Volume Action Route Admin User Audit    09/27/23 1218 2 mg (over 1 min) Given Intravenous Julia, Bonnie, APRN CRNA     1238 2 mg (over 1 min) Given Intravenous Julia, Bonnie, APRN CRNA     fentaNYL 50 mcg/mL (mcg)   Total dose: 200 mcg  Date/Time Rate/Dose/Volume Action Route Admin User Audit    09/27/23 1229 100 mcg (over 1 min) Given Intravenous Julia, Bonnie, APRN CRNA     1240 100 mcg (over 1 min) Given Intravenous Julia, Bonnie, APRN CRNA     HYDROmorphone 1 mg/mL (mg)   Total dose: 0.5 mg  Date/Time Rate/Dose/Volume Action Route Admin User Audit    09/27/23 1324 0.5 mg Given Intravenous Julia, Bonnie, APRN CRNA     lidocaine 2% (mg)   Total dose: 100 mg  Date/Time Rate/Dose/Volume Action Route Admin User Audit    09/27/23 1230 100 mg (over 1 min) Given Intravenous Julia, Bonnie, APRN CRNA     propofol 10 mg/mL (mg)   Total dose: 350 mg  Date/Time Rate/Dose/Volume Action Route Admin User Audit    09/27/23 1230 200 mg (over 2 min) Given Intravenous Julia, Bonnie, APRN CRNA     1235 100 mg (over 2 min) Given Intravenous Julia, Bonnie, APRN CRNA     1313 50 mg Given Intravenous Julia, Bonnie, APRN CRNA     propofol drip mcg/kg/min (mcg/kg/min)   Total dose:  242 mg Dosing weight: 78.7  Date/Time Rate/Dose/Volume Action Route Admin User Audit    09/27/23 1322 50 mcg/kg/min - 23.61 mL/hr New Bag Intravenous Julia, Bonnie, APRN CRNA     1348 25 mcg/kg/min - 11.805 mL/hr Rate Change Intravenous Julia, Bonnie, APRN CRNA     1408 50 mcg/kg/min - 23.61 mL/hr Rate Change Intravenous Julia, Bonnie, APRN CRNA     1420 25 mcg/kg/min - 11.805 mL/hr Rate Change Intravenous Julia, Bonnie, APRN CRNA     1447  Stopped Intravenous Julia, Bonnie, APRN CRNA     rocuronium 10 mg/mL (mg)   Total dose: 120 mg  Date/Time Rate/Dose/Volume Action Route Admin User Audit    09/27/23 1234 50 mg (over 1 min) Given Intravenous Julia, Bonnie, APRN CRNA     1313 30 mg Given Intravenous Julia, Bonnie, APRN CRNA     1319 20 mg Given Intravenous Julia, Bonnie, APRN CRNA     Comment: per surgeon request     1415 20 mg Given Intravenous Julia, Bonnie, APRN CRNA     phenylephrine (NIKIA-SYNEPHRINE) injection (mcg)   Total dose: 300 mcg  Date/Time Rate/Dose/Volume Action Route Admin User Audit    09/27/23 1232 100 mcg (over 1 min) New Bag Intravenous Julia, Bonnie, APRN CRNA     1343 100 mcg Bolus Intravenous Julia, Bonnie, APRN CRNA     1454 100 mcg Bolus Intravenous Eliel Ruiz APRN CRNA     dexamethasone (DECADRON) 4 mg/mL (mg)   Total dose: 10 mg  Date/Time Rate/Dose/Volume Action Route Admin User Audit    09/27/23 1316 10 mg Given Intravenous Julia, Bonnie, APRN CRNA     ondansetron 2 mg/mL (mg)   Total dose: 4 mg  Date/Time Rate/Dose/Volume Action Route Admin User Audit    09/27/23 1445 4 mg Given Intravenous Julia, Bonnie, APRN CRNA     sugammadex (BRIDION) 200mg/2mL (mg)   Total dose: 200 mg  Date/Time Rate/Dose/Volume Action Route Admin User Audit    09/27/23 1531 200 mg Given Intravenous Eliel Ruiz APRN CRNA     ceFAZolin Sodium (ANCEF) injection 2 g (g)   Total dose: 2 g Dosing weight: 82.6  Date/Time Rate/Dose/Volume Action Route Admin User Audit    09/27/23 1233 2 g (over 5 min) Given  Intravenous Julia, DAVID NicoleN CRNA     esmolol 10 mg/mL (mg)   Total dose: 30 mg  Date/Time Rate/Dose/Volume Action Route Admin User Audit    09/27/23 1301 30 mg (over 1 min) Given Intravenous JuliaBonnie méndez APRN CRNA     lidocaine 2g/250 mL D5W (ADULT STD PREMIX) ANALGESIA (mg/kg/hr)   Total dose: 169.79 mg Dosing weight: 79.9  Date/Time Rate/Dose/Volume Action Route Admin User Audit    09/27/23 1309 1 mg/kg/hr - 9.988 mL/hr New Bag Intravenous Julia, Bonnie, APRN CRNA edited    1440 0.5 mg/kg/hr - 4.994 mL/hr Rate Change Intravenous Julia, Bonnie, APRN CRNA     1539 1 mg/kg/hr - 9.988 mL/hr Rate Change Intravenous Eliel Ruiz APRN CRNA     magnesium sulfate 2 g in 50 mL sterile water intermittent infusion (g)   Total dose: 2 g Dosing weight: 82.6  Date/Time Rate/Dose/Volume Action Route Admin User Audit    09/27/23 1329 2 g Given Intravenous Francis Dumonta, APRN CRNA edited    tranexamic acid (CYKLOKAPRON) 2,360 mg in sodium chloride 0.9 % 50 mL bolus (mg)   Total dose: 2,361 mg Dosing weight: 78.7  Date/Time Rate/Dose/Volume Action Route Admin User Audit    09/27/23 1258 2,361 mg New Bag Intravenous Julia, Bonnie, APRN CRNA edited    tranexamic acid (CYKLOKAPRON) 3 g in sodium chloride 0.9 % 150 mL infusion (mg/kg/hr)   Total dose: 1,232.97 mg Dosing weight: 78.7  Date/Time Rate/Dose/Volume Action Route Admin User Audit    09/27/23 1308 10 mg/kg/hr - 39.35 mL/hr New Bag Intravenous Julia, Bonnie, APRN CRNA edited    1442  Stopped Intravenous Julia, Bonnie, APRN CRNA     dexmedeTOMIDine (PRECEDEX) 4 mcg/mL bolus (mcg)   Total dose: 40 mcg  Date/Time Rate/Dose/Volume Action Route Admin User Audit    09/27/23 1336 20 mcg New Bag Intravenous Julia, Bonnie, APRN CRNA     1535 20 mcg Bolus Intravenous Joseph, Eliel, APRN CRNA     phenylephrine (NIKIA-SYNEPHRINE) 0.2 mg/mL in sodium chloride 0.9 % 50 mL infusion (mcg/kg/min)   Total dose: 0.62 mg Dosing weight: 78.7  Date/Time Rate/Dose/Volume Action Route Admin  User Audit    09/27/23 1344 0.4 mcg/kg/min - 9.444 mL/hr New Bag Intravenous Julia, Bonnie, APRN CRNA edited    1350 0.5 mcg/kg/min - 11.805 mL/hr Rate Change Intravenous Julia, Bonnie, APRN CRNA     1354 0.4 mcg/kg/min - 9.444 mL/hr Rate Change Intravenous Julia, Bonnie, APRN CRNA     1359 0.3 mcg/kg/min - 7.083 mL/hr Rate Change Intravenous Julia, Bonnie, APRN CRNA     1402 0.2 mcg/kg/min - 4.722 mL/hr Rate Change Intravenous Julia, Bonnie, APRN CRNA     1405  Stopped Intravenous Julia, Bonnie, APRN CRNA     lactated ringers infusion (mL)   Total volume: 1,300 mL Dosing weight: 82.6  Date/Time Rate/Dose/Volume Action Route Admin User Audit    09/27/23 1218  New Bag Intravenous Shital Mcmanus MD edited    1335 700 mL Anesthesia Volume Adjustment Intravenous Julia, Bonnie, APRN CRNA     1444 300 mL New Bag Intravenous Julia, Bonnie, APRN CRNA     1543 300 mL Anesthesia Volume Adjustment Intravenous Eliel Ruiz, APRN CRNA     LR (mL/hr)   Total volume: 0 mL  Date/Time Rate/Dose/Volume Action Route Admin User Audit    09/27/23 1306 50 mL/hr New Bag Intravenous Julia, Bonnie, APRN CRNA     1443  Stopped Intravenous Julia, Bonnie, APRN CRNA        Local Meds No   Antibiotics cefazolin (Ancef) - last given at 1232     Pain Patient Currently in Pain: yes   PACU meds  fentanyl (Sublimaze): 200 mcg (total dose) last given at 1625    PCA / epidural Yes. PCA - hydromorphone (Dilaudid)   Capnography     Telemetry ECG Rhythm: Normal sinus rhythm   Inpatient Telemetry Monitor Ordered? No        Labs Glucose Lab Results   Component Value Date     09/27/2023     02/09/2022     03/11/2021       Hgb Lab Results   Component Value Date    HGB 13.8 09/22/2023    HGB 15.0 03/11/2021       INR No results found for: INR   PACU Imaging Not applicable     Wound/Incision Incision/Surgical Site 09/27/23 Posterior Cervical spine (Active)   Incision Assessment UT 09/27/23 3422   Marilee-Incision Assessment UTV  09/27/23 1645   Incision Drainage Amount None 09/27/23 1645   Dressing Intervention Clean, dry, intact 09/27/23 1645   Number of days: 0      CMS        Equipment Soft neck collar   Other LDA       IV Access Peripheral IV 09/27/23 Left Hand (Active)   Site Assessment WDL 09/27/23 1645   Line Status Infusing 09/27/23 1645   Dressing Transparent 09/27/23 1645   Dressing Status clean;dry;intact 09/27/23 1645   Dressing Intervention New dressing  09/27/23 1209   Line Intervention Flushed 09/27/23 1209   Phlebitis Scale 0-->no symptoms 09/27/23 1645   Infiltration? no 09/27/23 1645   Number of days: 0       Peripheral IV 09/27/23 Right Lower forearm (Active)   Site Assessment [facility] 09/27/23 1645   Line Status Infusing 09/27/23 1645   Dressing Transparent 09/27/23 1645   Dressing Status clean;dry;intact 09/27/23 1645   Phlebitis Scale 0-->no symptoms 09/27/23 1645   Infiltration? no 09/27/23 1645   Number of days: 0      Blood Products Not applicable EBL 50 mL   Intake/Output Date 09/27/23 0700 - 09/28/23 0659   Shift 0926-1792 1819-8963 2886-8559 24 Hour Total   INTAKE   I.V. 1000 300  1300   Shift Total(mL/kg) 1000(12.71) 300(3.81)  1300(16.52)   OUTPUT   Urine 260   260   Drains  30  30   Blood 50   50   Shift Total(mL/kg) 310(3.94) 30(0.38)  340(4.32)   Weight (kg) 78.7 78.7 78.7 78.7      Drains / Dunn Closed/Suction Drain 2 Posterior Neck Bulb 15 Cameroonian (Active)   Site Description UTV 09/27/23 1645   Dressing Status Normal: Clean, Dry & Intact 09/27/23 1645   Drainage Appearance Serosanguenous 09/27/23 1645   Status To bulb suction 09/27/23 1645   Output (ml) 30 ml 09/27/23 1538   Number of days: 0      Time of void PreOp      PostOp      Diapered? Yes   Bladder Scan     PO    water     Vitals    B/P: (!) 148/95  T: 99  F (37.2  C)    Temp src: Axillary  P:  Pulse: 81 (09/27/23 1715)          R: 14  O2:  SpO2: 100 %    O2 Device: Nasal cannula (09/27/23 1700)    Oxygen Delivery: 2 LPM (09/27/23 1700)          Family/support present significant other   Patient belongings     Patient transported on bed   DC meds/scripts (obs/outpt) Not applicable   Inpatient Pain Meds Released? Yes       Special needs/considerations Mona removed at 4895   Tasks needing completion None       RADHA SEVILLA, RN  [facility] [id number]

## 2023-09-27 NOTE — ANESTHESIA CARE TRANSFER NOTE
Patient: Salas Macias    Procedure: Procedure(s):  Cervical 2 and 7 Dome Laminectomies and C3-6 Laminoplasty with Medtronic Implants       Diagnosis: Cervical myelopathy (H) [G95.9]  Diagnosis Additional Information: No value filed.    Anesthesia Type:   General     Note:    Oropharynx: oropharynx clear of all foreign objects and spontaneously breathing  Level of Consciousness: awake  Oxygen Supplementation: room air    Independent Airway: airway patency satisfactory and stable  Dentition: dentition unchanged  Vital Signs Stable: post-procedure vital signs reviewed and stable  Report to RN Given: handoff report given  Patient transferred to: PACU    Handoff Report: Identifed the Patient, Identified the Reponsible Provider, Reviewed the pertinent medical history, Discussed the surgical course, Reviewed Intra-OP anesthesia mangement and issues during anesthesia, Set expectations for post-procedure period and Allowed opportunity for questions and acknowledgement of understanding      Vitals:  CRNA VITALS  9/27/2023 1506 - 9/27/2023 1548        9/27/2023             NIBP: 164/116    Pulse: 101    NIBP Mean: 130    ART BP: 157/78    ART Mean: 103    Temp: 36.6  C (97.9  F)    SpO2: 96 %    Resp Rate (observed): 20             Electronically Signed By: CATA Guallpa CRNA  September 27, 2023  3:48 PM

## 2023-09-27 NOTE — ANESTHESIA POSTPROCEDURE EVALUATION
Patient: Salas Macias    Procedure: Procedure(s):  Cervical 2 and 7 Dome Laminectomies and C3-6 Laminoplasty with Medtronic Implants       Anesthesia Type:  General    Note:  Disposition: Inpatient   Postop Pain Control: Uneventful            Sign Out: Well controlled pain   PONV: No   Neuro/Psych: Uneventful            Sign Out: Acceptable/Baseline neuro status   Airway/Respiratory: Uneventful            Sign Out: Acceptable/Baseline resp. status   CV/Hemodynamics: Uneventful            Sign Out: Acceptable CV status; No obvious hypovolemia; No obvious fluid overload   Other NRE: NONE   DID A NON-ROUTINE EVENT OCCUR? No           Last vitals:  Vitals Value Taken Time   /96 09/27/23 1730   Temp 37.2  C (99  F) 09/27/23 1645   Pulse 92 09/27/23 1741   Resp 15 09/27/23 1741   SpO2 100 % 09/27/23 1742   Vitals shown include unvalidated device data.    Electronically Signed By: LAN HERNANDEZ MD  September 27, 2023  6:05 PM

## 2023-09-27 NOTE — CONSULTS
[facility]  Consult Note - Hospitalist Service, GOLD TEAM   Date of Admission:  9/27/2023  Consult Requested by: Dr. Sullivan   Reason for Consult: Postoperative medical management    Assessment & Plan   Salas Macias is a 58 year old male admitted on 9/27/2023 s/p C2 and C7 dome laminectomies and C3-C6 open door laminoplasty.     Cervical Stenosis  Cervical Myelopathy  S/p C2 and C7 dome laminectomies and C3-C6 open door laminoplasty on 9/27 with Dr. Sullivan. EBL 50mL.   - Pain control per primary. Resume methadone at PTA dosing as below.   - DVT ppx per primary  - Hgb in am  - PT/OT  - Bowel regimen    Hx. Opioid Use on Chronic Methadone   - Methadone dose increase prior to surgery  - Give Methadone 40 mg x1 dose tonight  - Will need to confirm dose tomorrow when clinic is open and resume daily dosing    Paranoid Schizophrenia  - Zyprexa 20mg qHS    Depression  Anxiety  - Continue PTA Wellbutrin 150mg qAM and Xanax 1 mg TID PRN for anxiety    Hypertension  - Per history, not on medications  - BP mildly elevated postoperatively in setting of pain, trend    Hyperlipidemia  - Stopped taking Lipitor    GERD  - Symptoms controlled off of medications    Prediabetes  - A1c 6.1% in 2022  - Diet controlled         The patient's care was discussed with the Patient.    Clinically Significant Risk Factors Present on Admission                # Drug Induced Platelet Defect: home medication list includes an antiplatelet medication                   Cony Farooq PA-C  Hospitalist Service, GOLD TEAM   Securely message with [employer] (more info)  Text page via [facility] Paging/Directory   See signed in provider for up to date coverage information  ______________________________________________________________________    Chief Complaint   Neck Pain    History is obtained from the patient and EHR review.     History of Present Illness   Salas Macias is a 58 year old male with history of  cervical myelopathy, severe cervical cord stenosis, hypertension, hyperlipidemia, GERD, prediabetes, hx. CVA vs. TIA, paranoid schizophrenia, depression, anxiety, and hx. Heroin abuse in remission who was admitted following elective C2 and C7 dome laminectomies and C3-C6 open door laminoplasty. Intraoperatively, EBL 50mL.    Patient had longstanding history of neck pain and neurologic symptoms, was evaluated for surgery 1 year ago but had to postpone for personal/social reasons. His neurologic symptoms progressed during this time including diffuse numbness and tingling and he elected to proceed with surgical intervention.     Postoperatively, patient appears comfortable although reports poorly controlled pain on PCA pump. He requests dose of methadone as he missed the morning dose as advised prior to surgery.       Past Medical History    Past Medical History:   Diagnosis Date    Anxiety     Cervical myelopathy     Depression     HLD (hyperlipidemia)     Paranoid schizophrenia (H)        Past Surgical History   History reviewed. No pertinent surgical history.    Medications   Medications Prior to Admission   Medication Sig Dispense Refill Last Dose    ALPRAZolam (XANAX) 1 MG tablet Take 1 mg by mouth 3 times daily   9/26/2023    buPROPion (WELLBUTRIN XL) 150 MG 24 hr tablet Take 150 mg by mouth every morning   Past Month    methadone (DOLOPHINE-INTENSOL) 10 MG/ML (HIGH CONC) solution Take 35 mg by mouth daily Dose verified on September 20, 2023 - note that patient may be going up on his dose to 40 mg daily in near future so please re-verify   9/26/2023    OLANZapine (ZYPREXA) 20 MG tablet Take 20 mg by mouth At Bedtime   9/26/2023    vitamin D3 (CHOLECALCIFEROL) 50 mcg (2000 units) tablet Take 1 tablet (50 mcg) by mouth daily 90 tablet 3 9/26/2023    aspirin (ASA) 81 MG chewable tablet Take 1 tablet (81 mg) by mouth daily (Patient not taking: Reported on 9/15/2022) 30 tablet 4     atorvastatin (LIPITOR) 20 MG tablet  Take 1 tablet (20 mg) by mouth daily (Patient not taking: Reported on 9/15/2022) 30 tablet 4     nicotine polacrilex (NICORETTE) 4 MG gum Chew one piece every 1-2 hours as directed. 100 each 3     OLANZapine (ZYPREXA) 10 MG tablet Take 10 mg by mouth daily as needed             Review of Systems    The 10 point Review of Systems is negative other than noted in the HPI or here.      Physical Exam   Vital Signs: Temp: 99  F (37.2  C) Temp src: Axillary BP: (!) 156/96 Pulse: 81   Resp: 16 SpO2: 100 % O2 Device: Nasal cannula Oxygen Delivery: 2 LPM  Weight: 173 lbs 8.03 oz    General Appearance: Alert, eating dinner, no acute distress.   Respiratory: Clear bilaterally, no wheeze or rhonchi.   Cardiovascular: RRR, no peripheral edema.   GI: Soft, non-distended.   MSK: Soft cervical brace in situ.   Skin: Warm, dry.   Psych: Calm, pleasant, cooperative.      Medical Decision Making       60 MINUTES SPENT BY ME on the date of service doing chart review, history, exam, documentation & further activities per the note.      Data

## 2023-09-27 NOTE — BRIEF OP NOTE
[facility]    Brief Operative Note    Pre-operative diagnosis: Cervical myelopathy (H) [G95.9]  Post-operative diagnosis Same as pre-operative diagnosis    Procedure: Cervical 2 and 7 Dome Laminectomies and C3-6 Laminoplasty with Medtronic Implants, N/A - Spine    Surgeon: Surgeon(s) and Role:     * Anirudh Sullivan MD - Primary     * Ranjeet Ahumada MD - Fellow - Assisting  Anesthesia: General   Estimated Blood Loss: 50 mL from 9/27/2023 12:25 PM to 9/27/2023  3:36 PM      Drains: YSDNIE  Specimens: * No specimens in log *  Findings:   None.  Complications: None.  Implants:   Implant Name Type Inv. Item Serial No.  Lot No. LRB No. Used Action   PLATE BN 12MM PRECONTOUR TI MLT HL OPN DOOR PCUT KICKSTAND - [id number] Metallic Hardware/[place] PLATE BN 12MM PRECONTOUR TI MLT HL OPN DOOR PCUT KICKSTAND  [employer]  N/A 4 Implanted   20mm x mm screws medtronic    [employer]  N/A 13 Implanted

## 2023-09-27 NOTE — ANESTHESIA PROCEDURE NOTES
Arterial Line Procedure Note    Pre-Procedure   Staff -        Anesthesiologist:  Shital Mcmanus MD       Resident/Fellow: Stefan Bender MD       Performed By: resident       Location: OR       Pre-Anesthestic Checklist: patient identified, IV checked, risks and benefits discussed, informed consent, monitors and equipment checked, pre-op evaluation and at physician/surgeon's request  Timeout:       Correct Patient: Yes        Correct Procedure: Yes        Correct Site: Yes        Correct Position: Yes   Line Placement:   This line was placed Post Induction  Procedure   Procedure: arterial line       Laterality: right       Insertion Site: radial.  Sterile Prep        Standard elements of sterile barrier followed       Skin prep: Chloraprep  Insertion/Injection        Technique: ultrasound guided        1. Ultrasound was used to evaluate the access site.       2. Artery evaluated via ultrasound for patency/adequacy.       3. Using real-time ultrasound the needle/catheter was observed entering the artery/vein.       Catheter Type/Size: 20 G, 1.75 in/4.5 cm quick cath (integral wire)  Narrative        Tegaderm dressing used.       Complications: None apparent,        Arterial waveform: Yes

## 2023-09-28 ENCOUNTER — APPOINTMENT (OUTPATIENT)
Dept: GENERAL RADIOLOGY | Facility: CLINIC | Age: 58
End: 2023-09-28
Attending: ORTHOPAEDIC SURGERY
Payer: COMMERCIAL

## 2023-09-28 ENCOUNTER — APPOINTMENT (OUTPATIENT)
Dept: PHYSICAL THERAPY | Facility: CLINIC | Age: 58
End: 2023-09-28
Attending: ORTHOPAEDIC SURGERY
Payer: COMMERCIAL

## 2023-09-28 LAB
ANION GAP SERPL CALCULATED.3IONS-SCNC: 13 MMOL/L (ref 7–15)
BUN SERPL-MCNC: 8.4 MG/DL (ref 6–20)
CALCIUM SERPL-MCNC: 8.8 MG/DL (ref 8.6–10)
CHLORIDE SERPL-SCNC: 103 MMOL/L (ref 98–107)
CREAT SERPL-MCNC: 0.88 MG/DL (ref 0.67–1.17)
EGFRCR SERPLBLD CKD-EPI 2021: >90 ML/MIN/1.73M2
GLUCOSE BLDC GLUCOMTR-MCNC: 137 MG/DL (ref 70–99)
GLUCOSE SERPL-MCNC: 109 MG/DL (ref 70–99)
HCO3 SERPL-SCNC: 23 MMOL/L (ref 22–29)
HGB BLD-MCNC: 14 G/DL (ref 13.3–17.7)
LACTATE SERPL-SCNC: 1.2 MMOL/L (ref 0.7–2)
POTASSIUM SERPL-SCNC: 4.1 MMOL/L (ref 3.4–5.3)
SODIUM SERPL-SCNC: 139 MMOL/L (ref 135–145)

## 2023-09-28 PROCEDURE — 85018 HEMOGLOBIN: CPT | Performed by: ORTHOPAEDIC SURGERY

## 2023-09-28 PROCEDURE — 99254 IP/OBS CNSLTJ NEW/EST MOD 60: CPT | Performed by: PHYSICIAN ASSISTANT

## 2023-09-28 PROCEDURE — 97161 PT EVAL LOW COMPLEX 20 MIN: CPT | Mod: GP | Performed by: PHYSICAL THERAPIST

## 2023-09-28 PROCEDURE — 36415 COLL VENOUS BLD VENIPUNCTURE: CPT

## 2023-09-28 PROCEDURE — 36415 COLL VENOUS BLD VENIPUNCTURE: CPT | Performed by: ORTHOPAEDIC SURGERY

## 2023-09-28 PROCEDURE — 97530 THERAPEUTIC ACTIVITIES: CPT | Mod: GP | Performed by: PHYSICAL THERAPIST

## 2023-09-28 PROCEDURE — 250N000011 HC RX IP 250 OP 636: Mod: JZ | Performed by: INTERNAL MEDICINE

## 2023-09-28 PROCEDURE — 120N000002 HC R&B MED SURG/OB UMMC

## 2023-09-28 PROCEDURE — 250N000013 HC RX MED GY IP 250 OP 250 PS 637: Performed by: INTERNAL MEDICINE

## 2023-09-28 PROCEDURE — 72040 X-RAY EXAM NECK SPINE 2-3 VW: CPT

## 2023-09-28 PROCEDURE — 99232 SBSQ HOSP IP/OBS MODERATE 35: CPT | Performed by: INTERNAL MEDICINE

## 2023-09-28 PROCEDURE — 250N000013 HC RX MED GY IP 250 OP 250 PS 637: Performed by: ORTHOPAEDIC SURGERY

## 2023-09-28 PROCEDURE — 83605 ASSAY OF LACTIC ACID: CPT

## 2023-09-28 PROCEDURE — 250N000011 HC RX IP 250 OP 636: Performed by: ORTHOPAEDIC SURGERY

## 2023-09-28 PROCEDURE — 72040 X-RAY EXAM NECK SPINE 2-3 VW: CPT | Mod: 26 | Performed by: RADIOLOGY

## 2023-09-28 PROCEDURE — 80048 BASIC METABOLIC PNL TOTAL CA: CPT | Performed by: ORTHOPAEDIC SURGERY

## 2023-09-28 PROCEDURE — 250N000013 HC RX MED GY IP 250 OP 250 PS 637: Performed by: PHYSICIAN ASSISTANT

## 2023-09-28 PROCEDURE — 250N000013 HC RX MED GY IP 250 OP 250 PS 637: Performed by: STUDENT IN AN ORGANIZED HEALTH CARE EDUCATION/TRAINING PROGRAM

## 2023-09-28 RX ORDER — NIFEDIPINE 30 MG/1
30 TABLET, EXTENDED RELEASE ORAL DAILY
Status: DISCONTINUED | OUTPATIENT
Start: 2023-09-28 | End: 2023-09-30 | Stop reason: HOSPADM

## 2023-09-28 RX ORDER — POLYETHYLENE GLYCOL 3350 17 G/17G
1 POWDER, FOR SOLUTION ORAL DAILY
Qty: 7 PACKET | Refills: 0 | Status: SHIPPED | OUTPATIENT
Start: 2023-09-28

## 2023-09-28 RX ORDER — ACETAMINOPHEN 325 MG/1
650 TABLET ORAL EVERY 4 HOURS PRN
Qty: 100 TABLET | Refills: 0 | Status: SHIPPED | OUTPATIENT
Start: 2023-09-28

## 2023-09-28 RX ORDER — OXYCODONE HYDROCHLORIDE 10 MG/1
10 TABLET ORAL
Status: DISCONTINUED | OUTPATIENT
Start: 2023-09-28 | End: 2023-09-30 | Stop reason: HOSPADM

## 2023-09-28 RX ORDER — OXYCODONE HYDROCHLORIDE 5 MG/1
5-10 TABLET ORAL EVERY 4 HOURS PRN
Qty: 30 TABLET | Refills: 0 | Status: SHIPPED | OUTPATIENT
Start: 2023-09-28

## 2023-09-28 RX ORDER — METHADONE HYDROCHLORIDE 10 MG/ML
40 CONCENTRATE ORAL DAILY
Status: DISCONTINUED | OUTPATIENT
Start: 2023-09-28 | End: 2023-09-30 | Stop reason: HOSPADM

## 2023-09-28 RX ORDER — AMOXICILLIN 250 MG
1-2 CAPSULE ORAL 2 TIMES DAILY
Qty: 30 TABLET | Refills: 0 | Status: SHIPPED | OUTPATIENT
Start: 2023-09-28

## 2023-09-28 RX ORDER — METHOCARBAMOL 750 MG/1
750 TABLET, FILM COATED ORAL EVERY 6 HOURS
Status: DISCONTINUED | OUTPATIENT
Start: 2023-09-28 | End: 2023-09-30 | Stop reason: HOSPADM

## 2023-09-28 RX ORDER — HYDRALAZINE HYDROCHLORIDE 20 MG/ML
10 INJECTION INTRAMUSCULAR; INTRAVENOUS EVERY 6 HOURS PRN
Status: DISCONTINUED | OUTPATIENT
Start: 2023-09-28 | End: 2023-09-30 | Stop reason: HOSPADM

## 2023-09-28 RX ADMIN — FAMOTIDINE 20 MG: 20 TABLET ORAL at 20:01

## 2023-09-28 RX ADMIN — OXYCODONE HYDROCHLORIDE 10 MG: 10 TABLET ORAL at 00:51

## 2023-09-28 RX ADMIN — METHOCARBAMOL TABLETS 750 MG: 750 TABLET, COATED ORAL at 23:41

## 2023-09-28 RX ADMIN — METHADONE HYDROCHLORIDE 40 MG: 10 CONCENTRATE ORAL at 13:39

## 2023-09-28 RX ADMIN — SENNOSIDES AND DOCUSATE SODIUM 2 TABLET: 50; 8.6 TABLET ORAL at 20:01

## 2023-09-28 RX ADMIN — OXYCODONE HYDROCHLORIDE 15 MG: 5 TABLET ORAL at 15:11

## 2023-09-28 RX ADMIN — CEFAZOLIN 1 G: 1 INJECTION, POWDER, FOR SOLUTION INTRAMUSCULAR; INTRAVENOUS at 20:00

## 2023-09-28 RX ADMIN — OLANZAPINE 20 MG: 20 TABLET, FILM COATED ORAL at 22:27

## 2023-09-28 RX ADMIN — OXYCODONE HYDROCHLORIDE 10 MG: 10 TABLET ORAL at 12:30

## 2023-09-28 RX ADMIN — OXYCODONE HYDROCHLORIDE 15 MG: 5 TABLET ORAL at 20:00

## 2023-09-28 RX ADMIN — HYDRALAZINE HYDROCHLORIDE 10 MG: 20 INJECTION INTRAMUSCULAR; INTRAVENOUS at 20:13

## 2023-09-28 RX ADMIN — CEFAZOLIN 1 G: 1 INJECTION, POWDER, FOR SOLUTION INTRAMUSCULAR; INTRAVENOUS at 03:31

## 2023-09-28 RX ADMIN — METHOCARBAMOL TABLETS 750 MG: 750 TABLET, COATED ORAL at 17:13

## 2023-09-28 RX ADMIN — FAMOTIDINE 20 MG: 20 TABLET ORAL at 09:53

## 2023-09-28 RX ADMIN — METHOCARBAMOL TABLETS 750 MG: 750 TABLET, COATED ORAL at 09:53

## 2023-09-28 RX ADMIN — METHOCARBAMOL TABLETS 750 MG: 750 TABLET, COATED ORAL at 03:36

## 2023-09-28 RX ADMIN — ACETAMINOPHEN 975 MG: 325 TABLET, FILM COATED ORAL at 03:31

## 2023-09-28 RX ADMIN — CEFAZOLIN 1 G: 1 INJECTION, POWDER, FOR SOLUTION INTRAMUSCULAR; INTRAVENOUS at 12:30

## 2023-09-28 RX ADMIN — ACETAMINOPHEN 975 MG: 325 TABLET, FILM COATED ORAL at 12:30

## 2023-09-28 RX ADMIN — ACETAMINOPHEN 975 MG: 325 TABLET, FILM COATED ORAL at 20:01

## 2023-09-28 RX ADMIN — NIFEDIPINE 30 MG: 30 TABLET, FILM COATED, EXTENDED RELEASE ORAL at 09:53

## 2023-09-28 RX ADMIN — SENNOSIDES AND DOCUSATE SODIUM 1 TABLET: 50; 8.6 TABLET ORAL at 09:53

## 2023-09-28 ASSESSMENT — ACTIVITIES OF DAILY LIVING (ADL)
ADLS_ACUITY_SCORE: 27

## 2023-09-28 NOTE — PHARMACY-ADMISSION MEDICATION HISTORY
Medication history was completed pre-operatively on 9/21/23. Please see pharmacy note in a different encounter for details.   Pt confirmed his current methadone dose is 40 mg po daily. Last dose was taken on 9/26/23. Pharmacy will confirm the dose with [facility] on 9/28/23.

## 2023-09-28 NOTE — PROGRESS NOTES
"Orthopedic Surgery Progress Note:     Subjective:   OR yesterday. Tolerating PO. Pain controlled. Discussed surgery.     Objective:   BP (!) 165/91 (BP Location: Left arm)   Pulse 103   Temp 98.8  F (37.1  C) (Oral)   Resp 16   Ht 1.854 m (6' 0.99\")   Wt 78.7 kg (173 lb 8 oz)   SpO2 97%   BMI 22.90 kg/m    I/O this shift:  In: -   Out: 15 [Drains:15]  General: NAD. Resting comfortably in bed.  Respiratory: Breathing comfortably on RA.  Drain Output: 30/75 over last 2 shifts.  Musculoskeletal:     Motor Strength Right Left   Deltoids: C5 5/5 5/5   Biceps: C5 5/5 5/5   Brachialis: C6 5/5 5/5   Wrist extension: C6 5/5 5/5   Triceps: C7  5/5 5/5   Wrist flexion: C7 5/5 5/5    strength: C8 5/5 5/5   Hand intrinsics: T1 5/5 5/5     Sensation from C4-L1 is preserved.        Laboratory Data:  Lab Results   Component Value Date    WBC 8.6 09/22/2023    HGB 14.0 09/28/2023     09/22/2023       Images:  No new images were obtained.    Assessment & Plan:   Salas Macias is a 58 year old male with PMH including cervical myelopathy now s/p C3-6 laminoplasty on 9/27/23 with Dr. Sullivan.     Goals for 09/28/23:  Monitor drains   Encourage neck extension exercises   PT/OT     Spine Primary  - Activity: Up with assist until independent. No excessive bending or twisting. No lifting >10 lbs x 6 weeks. No Vikram lift for transfers.   - Weightbearing Status: WBAT.  - Pain Management: Transition from IV to PO as tolerated.   - Antibiotics: Ancef 1 g Q8H while drains remain.  - Diet: Begin with clear fluids and progress diet as tolerated.   - DVT Prophylaxis: SCDs only. No chemical DVT prophylaxis needed.  - Imaging: XR Upright PTDC; ordered.  - Labs: Labs PRN.  - Bracing/Splinting: None.  - Dressings: Keep Aquacel C/D/I x 7 days.  - Drains: Document output per shift; will be discontinued at orthopedic surgery discretion.  - Physical Therapy/Occupational Therapy: Evaluation and treatment.  - Consults: Hospitalist.  - " Follow-up: Clinic with Dr. Sullivan in 6 weeks with repeat radiographs.    - Disposition: Pending progress with therapies, pain control on orals, and medical stability; anticipate discharge to home/tcu on POD4-5.    Orthopedic surgery staff for this patient is Dr. Sullivan.    ------------------------------------------------------------------------------------------    [   ] PCA discontinued.  [   ] Drains discontinued.  [   ] Postoperative radiographs complete.  [   ] Discharge orders complete.  [   ] Discharge summary started.    Ranjeet Ahumada MD  Spine Fellow     PLEASE PAGE ME directly with any questions/concerns during regular weekday hours before 5 pm. If there is no response, if it is a weekend, or if it is during evening hours then please page the orthopedic surgery resident on call.    FOLLOWUP:    Future Appointments   Date Time Provider Department [place]   9/28/2023  7:00 PM Marizol Dang OT UROT [place]   9/29/2023  8:45 AM Ting Carlos, PT URPT [place]   11/7/2023  1:30 PM Anirudh Sullivan MD [facility]

## 2023-09-28 NOTE — CONSULTS
"Pain Service Consultation Note  [facility]      Patient Name: Salas Macias  MRN: [id number]   Age: 58 year old  Sex: male  Date: September 28, 2023                                      Reviewed: Yes    Referring Provider:  Ranjeet Ahumada MD  Referring Service:  orthopedics  Reason for Consultation: \"Evaluation and Treatment - Post Op Thoracic/Lumbar Surgery \"    Assessment/Recommendations:  Salas Macias is a 58 year old male who has PMH of hypertension, heroin use disorder on methadone maintenance since 2022, paranoid schizophrenia, depression, anxiety who was admitted on 9/27/23 following C2 and C7 dome laminectomies and C3-C6 open door laminoplasty on 9/27/23 with Dr. Sullivan.     Pain service consulted to assist with pain management.  PTA, on methadone maintenance 40mg once daily at [facility] place Monday to Saturday with take home dose on Sunday.    Salas is sitting up in the recliner, taking a break from the soft cervical collar.  He states pain is \"okay\" when at rest worse with movements.  Has worked with PT already.  States pain is 5-7/10 which is manageable.   PTA, pain is located int he neck and hands with pain level at 6/10.     Today, we reviewed his current pain medication regimen. Reviewed indications, risks and benefits.  He is agreed to transitioning off the PCA dilaudid and start oral opioid with IV opioid back up.      Plan:   Continue PTA dose of methadone 40mg once daily.  Discontinue PCA Dilaudid.  Start oxycodone 10mg PO Q 3 hours PRN.  ( If needed can increase oxycodone to 15mg PO Q 4 hours PRN). Please use oral first.  Start Dilaudid 0.2-0.4mg IV Q 2 hours PRN. Please use oral oxycodone first.  Robaxin 750 mg PO Q 6 hours.  Bowel regimen  Avoid ketamine due to dx of schizophrenia.    Thank you for the opportunity to participate in the care of Salas Macias  Pain Service will continue to follow.    Discussed with attending anesthesiologist  Primary " Service Contacted with Recommendations? Yes    Lalitha Taylor PA-C  9/28/2023            Per [place]  review pulled from system on 09/28/23.   Note, Salas receives maintenance methadone 40mg daily at [facility].--this will not show on the .    09/17/2023 09/14/2023 1 Alprazolam 1 Mg Tablet 45.00 15   09/03/2023 08/17/2023 1 Alprazolam 1 Mg Tablet 45.00 15   08/20/2023 08/17/2023 1 Alprazolam 1 Mg Tablet 45.00 15   08/06/2023 07/11/2023 1 Alprazolam 1 Mg Tablet 45.00 15   07/22/2023 07/11/2023 1 Alprazolam 1 Mg Tablet 45.00 15   07/08/2023 06/15/2023 1 Alprazolam 1 Mg Tablet 45.00 15   06/24/2023 06/15/2023 1 Alprazolam 1 Mg Tablet 45.00 15   06/09/2023 05/18/2023 1 Alprazolam 1 Mg Tablet 45.00 15         Pain Medications/Prescriber:  Methadone is prescribed at [facility] in [place], [place] since 2022.    Past Medical History:  Past Medical History:   Diagnosis Date    Anxiety     Cervical myelopathy     Depression     HLD (hyperlipidemia)     Paranoid schizophrenia (H)          Family History:    Family History   Problem Relation Age of Onset    Hypertension Mother     Peripheral Vascular Disease Mother     Restless Leg Syndrome Mother     Alzheimer Disease Father     Hypertension Sister     Diabetes No family hx of     Glaucoma No family hx of     Macular Degeneration No family hx of     Anesthesia Reaction No family hx of     Thrombosis No family hx of        Social History:  Social History     Tobacco Use    Smoking status: Every Day     Packs/day: 1.00     Years: 37.00     Pack years: 37.00     Types: Cigarettes    Smokeless tobacco: Never    Tobacco comments:     20 cigarettes daily    Substance Use Topics    Alcohol use: No           H/O Substance Abuse:  Heroin      Review of Systems:  Complete ROS reviewed. Unless otherwise noted, all other systems found to be negative.        Laboratory Results:  Recent Labs   Lab Test 09/28/23 0721 09/22/23  1242   PLT  --  161   BUN 8.4 13.1        Allergies:  No Known Allergies      Current Pain Related Medications:  Medications related to Pain Management (From now, onward)      Start     Dose/Rate Route Frequency Ordered Stop    09/30/23 0000  acetaminophen (TYLENOL) tablet 650 mg         650 mg Oral EVERY 4 HOURS PRN 09/27/23 1622      09/28/23 1300  methadone (DOLOPHINE-INTENSOL) 10 MG/ML (HIGH CONC) solution 40 mg        Note to Pharmacy: PTA Sig:Take 40 mg by mouth daily Dose verified on September 20, 2023 - note that patient may be going up on his dose to 40 mg daily in near future so please re-verify      40 mg Oral DAILY 09/28/23 0836      09/28/23 1200  oxyCODONE IR (ROXICODONE) tablet 10 mg        See Hyperspace for full Linked Orders Report.    10 mg Oral EVERY 3 HOURS PRN 09/28/23 1146      09/28/23 1200  oxyCODONE (ROXICODONE) tablet 15 mg        See Hyperspace for full Linked Orders Report.    15 mg Oral EVERY 4 HOURS PRN 09/28/23 1146      09/28/23 0936  methocarbamol (ROBAXIN) tablet 750 mg         750 mg Oral EVERY 6 HOURS 09/28/23 0831      09/28/23 0800  polyethylene glycol (MIRALAX) Packet 17 g         17 g Oral DAILY 09/27/23 1546      09/27/23 2006  ALPRAZolam (XANAX) tablet 1 mg        Note to Pharmacy: PTA Sig:Take 1 mg by mouth 3 times daily      1 mg Oral 3 TIMES DAILY PRN 09/27/23 2010 09/27/23 2000  senna-docusate (SENOKOT-S/PERICOLACE) 8.6-50 MG per tablet 1 tablet        See Hyperspace for full Linked Orders Report.    1 tablet Oral 2 TIMES DAILY 09/27/23 1546      09/27/23 2000  senna-docusate (SENOKOT-S/PERICOLACE) 8.6-50 MG per tablet 2 tablet        See Hyperspace for full Linked Orders Report.    2 tablet Oral 2 TIMES DAILY 09/27/23 1546      09/27/23 2000  acetaminophen (TYLENOL) tablet 975 mg         975 mg Oral EVERY 8 HOURS 09/27/23 1622 09/30/23 1959 09/27/23 1740  lidocaine 1 % 0.1-1 mL         0.1-1 mL Other EVERY 1 HOUR PRN 09/27/23 1740      09/27/23 1740  lidocaine (LMX4) cream          Topical EVERY  "1 HOUR PRN 09/27/23 1740      09/27/23 1622  HYDROmorphone (PF) (DILAUDID) injection 0.2 mg        See Hyperspace for full Linked Orders Report.    0.2 mg Intravenous EVERY 2 HOURS PRN 09/27/23 1622      09/27/23 1622  HYDROmorphone (PF) (DILAUDID) injection 0.4 mg        See Hyperspace for full Linked Orders Report.    0.4 mg Intravenous EVERY 2 HOURS PRN 09/27/23 1622      09/27/23 1540  magnesium hydroxide (MILK OF MAGNESIA) suspension 30 mL         30 mL Oral DAILY PRN 09/27/23 1546      09/27/23 1540  bisacodyl (DULCOLAX) suppository 10 mg         10 mg Rectal DAILY PRN 09/27/23 1546                Physical Exam:  Vitals: BP (!) 165/91 (BP Location: Left arm)   Pulse 103   Temp 98.8  F (37.1  C) (Oral)   Resp 16   Ht 1.854 m (6' 0.99\")   Wt 78.7 kg (173 lb 8 oz)   SpO2 97%   BMI 22.90 kg/m      Physical Exam:     CONSTITUTIONAL/GENERAL APPEARANCE:  NAD  EYES: EOMI, sclera anicteric, PERRLA  ENT/NECK: atraumatic, lips and oral mucous membranes dry  RESPIRATORY: non-labored breathing. No cough, wheeze  CV: tachycardic  ABDOMEN: Soft, non-tender, non-distended  MUSCULOSKELETAL/BACK/SPINE/EXTREMITIES: Moves all extremities purposefully.    NEURO: Alert and Oriented x3. Answers questions appropriately  SKIN/VASCULAR EXAM:  No jaundice, no visible rashes or lesions            Acute Inpatient Pain Service [employer]  Hours of pain coverage 24/7   Page via Amcom- Please Page the Pain Team Via Amcom: \"PAIN MANAGEMENT ACUTE INPATIENT/ [facility]\"           "

## 2023-09-28 NOTE — PLAN OF CARE
Goal Outcome Evaluation:      Plan of Care Reviewed With: patient           Received  alert and oriented x4, in room air  Complained of pain, with ongoing Hydromorphone PCA.   With mild numbness of fingers, Denies N/V  Voided using urinal, no difficulty  Cervical surgical wound dressing is dry, clean and intact, wearing soft collar with SYDNIE drain - output 75ml  PIV lines at b/l arms, with ongoing IV NS at 85ml/hr   Keep on the same care plan.

## 2023-09-28 NOTE — PROGRESS NOTES
09/28/23 0924   Appointment Info   Signing Clinician's Name / Credentials (PT) [facility]   Student Supervision Therapy services provided with the co-signing licensed therapist guiding and directing the services, and providing the skilled judgement and assessment throughout the session;Direct supervision provided       Present no   Language English   Living Environment   People in Home significant other   Current Living Arrangements apartment   Home Accessibility no concerns   Transportation Anticipated family or friend will provide   Living Environment Comments Pt has daily help from PCA when significant other is not home.   Self-Care   Usual Activity Tolerance excellent   Current Activity Tolerance moderate   Regular Exercise No   Equipment Currently Used at Home none   Fall history within last six months no   Activity/Exercise/Self-Care Comment Pt was not limited with activity prior to surgery. Reports numbness in arms and legs was biggest concern.   General Information   Onset of Illness/Injury or Date of Surgery 09/27/23   Referring Physician Anirudh Sullivan MD   Patient/Family Therapy Goals Statement (PT) Wants to get up and walk   Pertinent History of Current Problem (include personal factors and/or comorbidities that impact the POC) Salas Macias is a 58 year old male admitted on 9/27/2023 s/p C2 and C7 dome laminectomies and C3-C6 open door laminoplasty.   Existing Precautions/Restrictions fall;spinal  (soft collar for comfort)   Weight-Bearing Status - LUE weight-bearing as tolerated   Weight-Bearing Status - RUE weight-bearing as tolerated   Weight-Bearing Status - LLE full weight-bearing   Weight-Bearing Status - RLE full weight-bearing   Heart Disease Risk Factors High blood pressure   General Observations Pt supine in bed, soft collar donned, and agreeable to PT. Has yet to be OOB   Cognition   Affect/Mental Status (Cognition) WNL   Follows Commands  (Cognition) WNL   Pain Assessment   Patient Currently in Pain Yes, see Vital Sign flowsheet   Integumentary/Edema   Integumentary/Edema other (describe)   Integumentary/Edema Comments Wound dressing and drain intact   Posture    Posture Forward head position;Protracted shoulders   Range of Motion (ROM)   Range of Motion ROM deficits secondary to surgical procedure;ROM deficits secondary to pain;ROM deficits secondary to swelling;ROM deficits secondary to weakness   Strength (Manual Muscle Testing)   Strength (Manual Muscle Testing) No deficits observed during functional mobility   Bed Mobility   Comment, (Bed Mobility) SBA with bed mobility. HOB elevatd and use of 1 rail   Transfers   Comment, (Transfers) Transfers to FWW with CGA   Gait/Stairs (Locomotion)   Comment, (Gait/Stairs) Amb with FWW and CGA graded down to SBA   Balance   Balance other (describe)   Balance Comments ind balance sitting EOB, maintains good static standing balance without FWW for support   Sensory Examination   Sensory Perception patient reports no sensory changes   Clinical Impression   Criteria for Skilled Therapeutic Intervention Yes, treatment indicated   PT Diagnosis (PT) impaired functional mobility   Influenced by the following impairments s/p pain, swelling, and weakness   Functional limitations due to impairments bed mobility, transfers, and amb   Clinical Presentation (PT Evaluation Complexity) Stable/Uncomplicated   Clinical Presentation Rationale Per clinical judgement   Clinical Decision Making (Complexity) low complexity   Planned Therapy Interventions (PT) bed mobility training;gait training;transfer training;progressive activity/exercise;home exercise program   Anticipated Equipment Needs at Discharge (PT) walker, standard  (May need walker, but may progress to walking ind during LOS)   Risk & Benefits of therapy have been explained evaluation/treatment results reviewed;care plan/treatment goals reviewed;risks/benefits  reviewed;current/potential barriers reviewed;participants voiced agreement with care plan;participants included;patient   PT Total Evaluation Time   PT Eval, Low Complexity Minutes (80364) 14   Plan of Care Review   Plan of Care Reviewed With patient   Physical Therapy Goals   PT Frequency Daily   PT Predicted Duration/Target Date for Goal Attainment 10/01/23   PT Goals Bed Mobility;Transfers;Gait   PT: Bed Mobility Independent;Supine to/from sit;Rolling;Bridging;Within precautions   PT: Transfers Supervision/stand-by assist;Assistive device;Within precautions   PT: Gait Supervision/stand-by assist;Standard walker;Greater than 200 feet   Interventions   Interventions Quick Adds Therapeutic Activity   Therapeutic Activity   Therapeutic Activities: dynamic activities to improve functional performance Minutes (45584) 20   Symptoms Noted During/After Treatment Increased pain   Treatment Detail/Skilled Intervention See eval for level of assist with mobility. Pt is educated on spinal precautions and wearing soft collar for comfort. Once sitting EOB, pt performs gentle repeated neck extension, encouraged pt to continue this multiple times per day. Excess time needed for line management to prep for mobilizing. Following sekou, pt amb ~200 more ft with FWW and SBA. Demonstartes slow, but steady pace with no LOB. Limited reliance on FWW noted, pt may progress to not needed this as was walking ind at baseline. Following amb, completes sit<>stand from EOB with FWW and SBA. Pt agreeable to end session in recliner. Completes SPT from bed to recliner with FWW and SBA. Ended session with pt sitting up in recliner, RN present in room.   PT Discharge Planning   PT Plan flat bed mobility, amb tolerance, progress to no FWW if appropriate   PT Discharge Recommendation (DC Rec) home with assist   PT Rationale for DC Rec Pt tolerating OOB activity well and anticipated he will continue to progress ind with all functional mobility. No  anticipated barriers for pt to discharge home with assist.   PT Brief overview of current status SBA with FWW   Total Session Time   Timed Code Treatment Minutes 20   Total Session Time (sum of timed and untimed services) 34

## 2023-09-28 NOTE — DISCHARGE INSTRUCTIONS
Wound Care    You may leave the dry dressing in place over your incision for 2-3 days after discharge. After this, the dressing can be removed and the wound can be left open to air.    If you are wearing a cervical collar and your incision is on your neck, you can keep a dry dressing over the incision to keep it from being rubbed. However you should change the dressing every 1-2 days.     If the dressing becomes wet for any reason, such as with sweat or water, it should be changed.     5 days after discharge, you may shower with the dressing removed and allow water to gently fall over the wound. After the shower, simply pat the wound dry. Do not apply and creams or lotions to you wound.     You wound should remain free of discharge or significant surrounding redness. If you notice any drainage or discharge, or it it develops significant  Redness, please contact us the clinic immediately at [phone]. After hours or weekends, you may go to the Orthopedic  walk in clinic on [address] [place] [address] from 7 am to 7pm -daily.       Activity    We encourage you to be up and out of bed regularly. Please try to walk 30 minutes per day. If you have been prescribed a brace, please wear the brace when up and out of bed. Avoid lifting over 10 pounds, avoid lifting anything overhead, and avoid repetitive bending or twisting.  This means no sporting activities. (such as running, tennis, bowling, golf, bicycling, etc.) until you are seen in clinic

## 2023-09-28 NOTE — PROGRESS NOTES
"SPIRITUAL HEALTH SERVICES Progress Note  [facility] ([facility]) 5B    Saw pt Salas Macias per admission request.    Patient/Family Understanding of Illness and Goals of Care - Pt Salas shared that he was \"overdue\" for his surgery yesterday, but is hopeful to experience less pain in daily life post-surgery.     Distress and Loss - Pt stated that he \"lives to die\" and therefore is not afraid, though he also shared that going into surgery yesterday he felt nervous. He did not express distress or loss, though, and repeatedly said that he is \"at peace\".     Strengths, Coping, and Resources  - Pt has 7 children and multiple grandchildren, as well as a partner, who visit him in the hospital and help him cope. He also named \"the big man, God\" as a source of comfort and peace.     Meaning, Beliefs, and Spirituality - Lianne is important to him, and we explored how he experiences God in the many unknowns and uncontrollable elements of life. We shared prayer together to end our visit.     Plan of Care - I oriented pt to [facility] and he knows how to get in touch should he desire another visit.     Rosio Verde  Chaplain Resident  Pager [phone]    * [facility] remains available 24/7 for emergent requests/referrals, either by having the switchboard page the on-call  or by entering an ASAP/STAT consult in Epic (this will also page the on-call ). Routine Epic consults receive an initial response within 24 hours.*    "

## 2023-09-28 NOTE — PROGRESS NOTES
CLINICAL NUTRITION SERVICES    Reason for Assessment: Provider Order - specify Nutrition Education - Patient is Post op Complex Spine - Patient needs high-protein education and ordering of preferred supplements at 1.5 g protein per kg body weight and Positive Admission Screen (2-13 lb wt loss, decreased appetite)     Per chart review: Pt is a 58 YOM who is s/p C2 and C7 dome laminectomies and C3-C6 open door laminoplasty, other past medical history noted for cervical myelopathy, severe cervical cord stenosis, hypertension, hyperlipidemia, GERD, prediabetes, CVA, TIA, paranoid schizophrenia, depression, anxiety, and heroin abuse in remission. MAR reviewed. Labs reviewed. Pt with diet orders for ADAT to regular with Carlos between meals. Weight trends reviewed, pt appears to have had significant weight loss of 8.9% in 1 month, BMI still normal at 22.9.     Per pt visit: RD visited pt at bedside, reviewed reason for visit as above, reviewed weight trends, pt notes working/traveling around a lot so not eating consistent meals and agreeing on weight loss, RD reviewed the importance of adequate nutritional intakes for post op healing, encouraging good sources of protein at all meals, pt notes advancing diet and taking meals, reviewed upon discharge pt needs to prioritize nutrition intakes/meals, pt verbalizes understanding and will get back into better eating routine, reviewed supplement ordered, pt agreeable and also requesting Ensure, RD will update supplement orders to below.     Nutrition Diagnosis: Predicted food- and nutrition-related knowledge deficit r/t therapeutic recommendations    Interventions: Provided instruction on adequate protein intakes for healing, updated supplement order to fruit punch Carlos BID at 10 am and 2 pm + strawberry Ensure once daily at 2 pm.    Goals: Patient will verbalize understanding of therapeutic recommendations and accept supplement - met.     Follow-up: Will monitor per LOS policy  unless otherwise consulted.     Mora Moya RD, CNSC, LD  [facility] [address] Med/[address]urg RD pager: [phone]

## 2023-09-28 NOTE — PHARMACY
Opioid Treatment Program (Methadone Clinic) Information Note      Treatment program name: [facility]  Location ([facility]): [place]   Phone number: [phone]              Spoke with: Blanca      Patient's current methadone dose verified as: 40 mg PO daily   Last dose was administered on: 9/26   Take-home dose information (if applicable): no      Note(s): Treatment programs in [place] dispense methadone using the 10 mg/ml oral concentrate.

## 2023-09-28 NOTE — PROGRESS NOTES
[facility]    Medicine Progress Note - Hospitalist Service, GOLD TEAM 21    Date of Admission:  9/27/2023    Assessment & Plan   Salas Macias is a 58 year old male admitted on 9/27/2023 s/p C2 and C7 dome laminectomies and C3-C6 open door laminoplasty.      Cervical Stenosis  Cervical Myelopathy  S/p C2 and C7 dome laminectomies and C3-C6 open door laminoplasty on 9/27 with Dr. Sullivan. EBL 50mL.   - Pain control per primary. Resume methadone at PTA dosing as below. Additional agents include Muscle relaxers, lidocaine, PCA (being weaned)   - DVT ppx per primary  - Hgb stable this AM.   - PT/OT  - Bowel regimen     Hx. Opioid Use on Chronic Methadone   - Methadone dose increase prior to surgery  - Give Methadone 40 mg x1 dose tonight       Paranoid Schizophrenia  - Zyprexa 20mg qHS     Depression  Anxiety  - Continue PTA Wellbutrin 150mg qAM and Xanax 1 mg TID PRN for anxiety     Hypertension  -Started on nifedipine   - PRN hydralazine     Hyperlipidemia  - Stopped taking Lipitor     GERD  - Symptoms controlled off of medications     Prediabetes  - A1c 6.1% in 2022  - Diet controlled       Diet: Snacks/Supplements Adult: Carlos; Between Meals  Advance Diet as Tolerated: Regular Diet Adult    DVT Prophylaxis: Low Risk/Ambulatory with no VTE prophylaxis indicated  Dunn Catheter: Not present  Lines: None     Cardiac Monitoring: None  Code Status: Full Code      Clinically Significant Risk Factors                                    Disposition Plan     Expected Discharge Date: 09/29/2023                  Antoine Yang MD  Hospitalist Service, GOLD TEAM 21  [facility]  Securely message with [employer] (more info)  Text page via [employer] Paging/Directory   See signed in provider for up to date coverage information  ______________________________________________________________________    Interval History   Pain is 6/10  No fever or  chills  No BM however states he hasn't eaten much  No CP or SOB. On NC however oxygen not turned on.     Physical Exam   Vital Signs: Temp: 98.8  F (37.1  C) Temp src: Oral BP: (!) 165/91 Pulse: 103   Resp: 16 SpO2: 97 % O2 Device: Nasal cannula Oxygen Delivery: 2 LPM  Weight: 173 lbs 8.03 oz    Physical Exam  Constitutional:       Appearance: Normal appearance.      Comments: Soft cervical collar   HENT:      Head: Normocephalic.      Mouth/Throat:      Mouth: Mucous membranes are moist.   Cardiovascular:      Rate and Rhythm: Normal rate and regular rhythm.      Heart sounds: No murmur heard.     No friction rub. No gallop.   Pulmonary:      Effort: Pulmonary effort is normal. No respiratory distress.      Breath sounds: No stridor. No wheezing.   Abdominal:      General: Abdomen is flat. There is no distension.      Palpations: There is no mass.      Tenderness: There is no abdominal tenderness.   Musculoskeletal:      Right lower leg: No edema.      Left lower leg: No edema.   Neurological:      General: No focal deficit present.      Mental Status: He is alert and oriented to person, place, and time.           Medical Decision Making       **CLEAR ALL SELECTIONS**      Data     I have personally reviewed the following data over the past 24 hrs:    N/A  \   14.0   / N/A     139 103 8.4 /  109 (H)   4.1 23 0.88 \

## 2023-09-29 ENCOUNTER — APPOINTMENT (OUTPATIENT)
Dept: PHYSICAL THERAPY | Facility: CLINIC | Age: 58
End: 2023-09-29
Attending: ORTHOPAEDIC SURGERY
Payer: COMMERCIAL

## 2023-09-29 LAB
ATRIAL RATE - MUSE: 103 BPM
DIASTOLIC BLOOD PRESSURE - MUSE: NORMAL MMHG
GLUCOSE BLDC GLUCOMTR-MCNC: 123 MG/DL (ref 70–99)
INTERPRETATION ECG - MUSE: NORMAL
LACTATE SERPL-SCNC: 1.6 MMOL/L (ref 0.7–2)
LACTATE SERPL-SCNC: 3.1 MMOL/L (ref 0.7–2)
P AXIS - MUSE: 77 DEGREES
PR INTERVAL - MUSE: 130 MS
QRS DURATION - MUSE: 106 MS
QT - MUSE: 330 MS
QTC - MUSE: 432 MS
R AXIS - MUSE: 78 DEGREES
SYSTOLIC BLOOD PRESSURE - MUSE: NORMAL MMHG
T AXIS - MUSE: 21 DEGREES
VENTRICULAR RATE- MUSE: 103 BPM

## 2023-09-29 PROCEDURE — 250N000013 HC RX MED GY IP 250 OP 250 PS 637: Performed by: PHYSICIAN ASSISTANT

## 2023-09-29 PROCEDURE — 120N000002 HC R&B MED SURG/OB UMMC

## 2023-09-29 PROCEDURE — 93010 ELECTROCARDIOGRAM REPORT: CPT | Performed by: INTERNAL MEDICINE

## 2023-09-29 PROCEDURE — 97530 THERAPEUTIC ACTIVITIES: CPT | Mod: GP | Performed by: PHYSICAL THERAPIST

## 2023-09-29 PROCEDURE — 83605 ASSAY OF LACTIC ACID: CPT

## 2023-09-29 PROCEDURE — 36415 COLL VENOUS BLD VENIPUNCTURE: CPT | Performed by: INTERNAL MEDICINE

## 2023-09-29 PROCEDURE — 250N000013 HC RX MED GY IP 250 OP 250 PS 637: Performed by: INTERNAL MEDICINE

## 2023-09-29 PROCEDURE — 250N000013 HC RX MED GY IP 250 OP 250 PS 637: Performed by: STUDENT IN AN ORGANIZED HEALTH CARE EDUCATION/TRAINING PROGRAM

## 2023-09-29 PROCEDURE — 99232 SBSQ HOSP IP/OBS MODERATE 35: CPT | Performed by: INTERNAL MEDICINE

## 2023-09-29 PROCEDURE — 99231 SBSQ HOSP IP/OBS SF/LOW 25: CPT | Mod: GC | Performed by: ANESTHESIOLOGY

## 2023-09-29 PROCEDURE — 250N000013 HC RX MED GY IP 250 OP 250 PS 637: Performed by: ORTHOPAEDIC SURGERY

## 2023-09-29 PROCEDURE — 250N000011 HC RX IP 250 OP 636: Performed by: ORTHOPAEDIC SURGERY

## 2023-09-29 PROCEDURE — 36415 COLL VENOUS BLD VENIPUNCTURE: CPT

## 2023-09-29 PROCEDURE — 93005 ELECTROCARDIOGRAM TRACING: CPT | Performed by: PHYSICAL THERAPIST

## 2023-09-29 PROCEDURE — 258N000003 HC RX IP 258 OP 636

## 2023-09-29 PROCEDURE — 83605 ASSAY OF LACTIC ACID: CPT | Performed by: INTERNAL MEDICINE

## 2023-09-29 PROCEDURE — 999N000111 HC STATISTIC OT IP EVAL DEFER

## 2023-09-29 RX ORDER — CARVEDILOL 6.25 MG/1
6.25 TABLET ORAL 2 TIMES DAILY WITH MEALS
Status: DISCONTINUED | OUTPATIENT
Start: 2023-09-29 | End: 2023-09-30

## 2023-09-29 RX ORDER — SODIUM CHLORIDE, SODIUM LACTATE, POTASSIUM CHLORIDE, CALCIUM CHLORIDE 600; 310; 30; 20 MG/100ML; MG/100ML; MG/100ML; MG/100ML
INJECTION, SOLUTION INTRAVENOUS
Status: COMPLETED
Start: 2023-09-29 | End: 2023-09-29

## 2023-09-29 RX ORDER — SODIUM CHLORIDE 9 MG/ML
INJECTION, SOLUTION INTRAVENOUS
Status: COMPLETED
Start: 2023-09-29 | End: 2023-09-29

## 2023-09-29 RX ADMIN — FAMOTIDINE 20 MG: 20 TABLET ORAL at 20:20

## 2023-09-29 RX ADMIN — CARVEDILOL 6.25 MG: 6.25 TABLET, FILM COATED ORAL at 09:28

## 2023-09-29 RX ADMIN — CEFAZOLIN 1 G: 1 INJECTION, POWDER, FOR SOLUTION INTRAMUSCULAR; INTRAVENOUS at 20:26

## 2023-09-29 RX ADMIN — SENNOSIDES AND DOCUSATE SODIUM 2 TABLET: 50; 8.6 TABLET ORAL at 20:20

## 2023-09-29 RX ADMIN — METHOCARBAMOL TABLETS 750 MG: 750 TABLET, COATED ORAL at 09:28

## 2023-09-29 RX ADMIN — ACETAMINOPHEN 975 MG: 325 TABLET, FILM COATED ORAL at 12:04

## 2023-09-29 RX ADMIN — CEFAZOLIN 1 G: 1 INJECTION, POWDER, FOR SOLUTION INTRAMUSCULAR; INTRAVENOUS at 03:48

## 2023-09-29 RX ADMIN — METHOCARBAMOL TABLETS 750 MG: 750 TABLET, COATED ORAL at 21:30

## 2023-09-29 RX ADMIN — SODIUM CHLORIDE 500 ML: 9 INJECTION, SOLUTION INTRAVENOUS at 20:22

## 2023-09-29 RX ADMIN — CEFAZOLIN 1 G: 1 INJECTION, POWDER, FOR SOLUTION INTRAMUSCULAR; INTRAVENOUS at 12:04

## 2023-09-29 RX ADMIN — FAMOTIDINE 20 MG: 20 TABLET ORAL at 09:28

## 2023-09-29 RX ADMIN — NIFEDIPINE 30 MG: 30 TABLET, FILM COATED, EXTENDED RELEASE ORAL at 09:28

## 2023-09-29 RX ADMIN — METHADONE HYDROCHLORIDE 40 MG: 10 CONCENTRATE ORAL at 09:30

## 2023-09-29 RX ADMIN — SODIUM CHLORIDE, POTASSIUM CHLORIDE, SODIUM LACTATE AND CALCIUM CHLORIDE 500 ML: 600; 310; 30; 20 INJECTION, SOLUTION INTRAVENOUS at 17:37

## 2023-09-29 RX ADMIN — BUPROPION HYDROCHLORIDE 150 MG: 150 TABLET, FILM COATED, EXTENDED RELEASE ORAL at 09:28

## 2023-09-29 RX ADMIN — METHOCARBAMOL TABLETS 750 MG: 750 TABLET, COATED ORAL at 16:24

## 2023-09-29 RX ADMIN — ACETAMINOPHEN 975 MG: 325 TABLET, FILM COATED ORAL at 03:48

## 2023-09-29 RX ADMIN — OXYCODONE HYDROCHLORIDE 15 MG: 5 TABLET ORAL at 01:29

## 2023-09-29 RX ADMIN — METHOCARBAMOL TABLETS 750 MG: 750 TABLET, COATED ORAL at 03:48

## 2023-09-29 RX ADMIN — ACETAMINOPHEN 975 MG: 325 TABLET, FILM COATED ORAL at 20:19

## 2023-09-29 RX ADMIN — CARVEDILOL 6.25 MG: 6.25 TABLET, FILM COATED ORAL at 17:38

## 2023-09-29 RX ADMIN — POLYETHYLENE GLYCOL 3350 17 G: 17 POWDER, FOR SOLUTION ORAL at 09:28

## 2023-09-29 RX ADMIN — SENNOSIDES AND DOCUSATE SODIUM 2 TABLET: 50; 8.6 TABLET ORAL at 09:27

## 2023-09-29 RX ADMIN — OLANZAPINE 20 MG: 20 TABLET, FILM COATED ORAL at 21:30

## 2023-09-29 ASSESSMENT — ACTIVITIES OF DAILY LIVING (ADL)
ADLS_ACUITY_SCORE: 31
ADLS_ACUITY_SCORE: 31
ADLS_ACUITY_SCORE: 27
ADLS_ACUITY_SCORE: 31
ADLS_ACUITY_SCORE: 27
ADLS_ACUITY_SCORE: 31
ADLS_ACUITY_SCORE: 27
ADLS_ACUITY_SCORE: 31
ADLS_ACUITY_SCORE: 27
ADLS_ACUITY_SCORE: 31

## 2023-09-29 NOTE — PROGRESS NOTES
"Orthopedic Surgery Progress Note:     Subjective:   No acute events. Tolerating PO. Walking halls. Pain controlled. Denies numbness or tingling.     Objective:   BP (!) 162/96 (BP Location: Left arm)   Pulse (!) 129   Temp 99.6  F (37.6  C) (Oral)   Resp 18   Ht 1.854 m (6' 0.99\")   Wt 78.7 kg (173 lb 8 oz)   SpO2 96%   BMI 22.90 kg/m    No intake/output data recorded.  General: NAD. Resting comfortably in bed.  Respiratory: Breathing comfortably on RA.  Drain Output: 40/15 over last 2 shifts.  Musculoskeletal:       Motor Strength Right Left   Deltoids: C5 5/5 5/5   Biceps: C5 5/5 5/5   Brachialis: C6 5/5 5/5   Wrist extension: C6 5/5 5/5   Triceps: C7  5/5 5/5   Wrist flexion: C7 5/5 5/5    strength: C8 5/5 5/5   Hand intrinsics: T1 5/5 5/5      Sensation from C4-L1 is preserved.    Laboratory Data:  Lab Results   Component Value Date    WBC 8.6 09/22/2023    HGB 14.0 09/28/2023     09/22/2023       Images:  Narrative & Impression   Exam: XR CERVICAL SPINE 2/3 VIEWS, 9/28/2023 3:05 PM     Comparison: Cervical spine     History: post op     Findings:  There is normal cervical spine alignment. There is no fracture or  dislocation of the cervical spine. Postoperative changes of open door  laminoplasty from C3 through C6.. There is a surgical drain overlying  the right dorsal aspect of the spine. Small amount of gas within the  surgical region. There is preservation of vertebral body height.  Multilevel disc space narrowing, most pronounced at C3-4 and C4-5.  Facet arthropathy bilaterally. Anterior endplate osteophytes seen  throughout the cervical spine. The atlantodental distance is normal.  There is no prevertebral soft tissue swelling. The partially  visualized lung apices appear clear.                                                                       Impression:   1. Postsurgical changes of open door laminoplasty from C3 through C6  with a overlying surgical drain.  2. Multilevel lumbar " spondylosis.       Assessment & Plan:   Salas Macias is a 58 year old male with PMH including cervical myelopathy now s/p C3-6 laminoplasty on 9/27/23 with Dr. Sullivan.      Goals for today:  Monitor drains   Encourage neck extension exercises   PT/OT      Spine Primary  - Activity: Up with assist until independent. No excessive bending or twisting. No lifting >10 lbs x 6 weeks. No Vikram lift for transfers.   - Weightbearing Status: WBAT.  - Pain Management: Transition from IV to PO as tolerated.   - Antibiotics: Ancef 1 g Q8H while drains remain.  - Diet: Begin with clear fluids and progress diet as tolerated.   - DVT Prophylaxis: SCDs only. No chemical DVT prophylaxis needed.  - Imaging: XR Upright PTDC; ordered.  - Labs: Labs PRN.  - Bracing/Splinting: None.  - Dressings: Keep Aquacel C/D/I x 7 days.  - Drains: Document output per shift; will be discontinued at orthopedic surgery discretion.  - Physical Therapy/Occupational Therapy: Evaluation and treatment.  - Consults: Hospitalist.  - Follow-up: Clinic with Dr. Sullivan in 6 weeks with repeat radiographs.     - Disposition: Pending progress with therapies, pain control on orals, and medical stability; anticipate discharge to home/tcu on POD4-5.     Orthopedic surgery staff for this patient is Dr. Sullivan.       Ranjeet Ahumada MD  Spine Fellow     PLEASE PAGE ME directly with any questions/concerns during regular weekday hours before 5 pm. If there is no response, if it is a weekend, or if it is during evening hours then please page the orthopedic surgery resident on call.    FOLLOWUP:    Future Appointments   Date Time Provider Department Center   9/29/2023  8:45 AM Ting Carlso PT [facility] [place]   9/29/2023  7:00 PM Marizol Dang OT [facility] [place]   11/7/2023  1:30 PM Anirudh Sullivan MD [facility]

## 2023-09-29 NOTE — PLAN OF CARE
Occupational Therapy: Orders received. Chart reviewed and discussed with care team.? Occupational Therapy not indicated as patient receives assistance with ADLs at baseline, no acute OT needs.? Defer discharge recommendations to PT.? Will complete orders.

## 2023-09-29 NOTE — PROGRESS NOTES
Pain Service Progress Note  [facility]  Date: 09/29/2023       Patient Name: Salas Macias  MRN: [id number]  Age: 58 year old  Sex: male      Assessment:  Salas Macias is a 58 year old male who has PMH of hypertension, heroin use disorder on methadone maintenance since 2022, paranoid schizophrenia, depression, anxiety who was admitted on 9/27/23 following C2 and C7 dome laminectomies and C3-C6 open door laminoplasty on 9/27/23 with Dr. Sullivan.      Pain service consulted to assist with pain management.  PTA, on methadone maintenance 40mg once daily at [facility] Monday to Saturday with take home dose on Sunday.     Today, patient is sitting up, walking around room upon interview and states his pain is well controlled on his current PO regimen, he has not required IV doses for ~24 hours. Has been mobile and working with therapies, eating and sleeping well.       Plan:   Continue PTA dose of methadone 40mg once daily.  Continue oxycodone 10mg PO Q 3 hours PRN.  ( If needed can increase oxycodone to 15mg PO Q 4 hours PRN). Please use oral first.  Would recommend to wean off of oxycodone as tolerated and discharge with PTA medications.  Discontinue Dilaudid 0.2-0.4mg IV Q 2 hours PRN. Patient has not needed.   Robaxin 750 mg PO Q 6 hours.  Bowel regimen  Avoid ketamine due to dx of schizophrenia.    Pain Service will sign off.    Discussed with attending anesthesiologist Dr. Castaneda.     Yu Cervantes MD  09/29/2023     Overnight Events: No acute events overnight.     Tubes/Drains: Yes, cervical.     Subjective:  Doing great.   Nausea: No  Vomiting: No  Pruritus: No    Pain Location:  Neck.     Pain Intensity:    Satisfied with your level of pain control: Yes    Diet: Advance Diet as Tolerated: Regular Diet Adult  Snacks/Supplements Adult: Other; Fruit Punch Carlos BID at 10 am and 2 pm + strawberry Ensure once daily at 2 pm; Between Meals  Discharge Instruction - Regular Diet  "Adult    Relevant Labs:  Recent Labs   Lab Test 09/28/23  0721 09/22/23  1242   PLT  --  161   BUN 8.4 13.1       Physical Exam:  Vitals: BP (!) 157/90   Pulse 110   Temp 98  F (36.7  C) (Oral)   Resp 18   Ht 1.854 m (6' 0.99\")   Wt 78.7 kg (173 lb 8 oz)   SpO2 100%   BMI 22.90 kg/m      Physical Exam:   Orientation:  Alert, oriented, and in no acute distress: Yes  Sedation: No    Motor Examination:  5/5 Strength in lower extremities: Yes      Relevant Medications:  Current Pain Medications:  Medications related to Pain Management (From now, onward)      Start     Dose/Rate Route Frequency Ordered Stop    09/30/23 0000  acetaminophen (TYLENOL) tablet 650 mg         650 mg Oral EVERY 4 HOURS PRN 09/27/23 1622      09/28/23 1300  methadone (DOLOPHINE-INTENSOL) 10 MG/ML (HIGH CONC) solution 40 mg        Note to Pharmacy: PTA Sig:Take 40 mg by mouth daily Dose verified on September 20, 2023 - note that patient may be going up on his dose to 40 mg daily in near future so please re-verify      40 mg Oral DAILY 09/28/23 0836      09/28/23 1200  oxyCODONE IR (ROXICODONE) tablet 10 mg        See Hyperspace for full Linked Orders Report.    10 mg Oral EVERY 3 HOURS PRN 09/28/23 1146      09/28/23 1200  oxyCODONE (ROXICODONE) tablet 15 mg        See Hyperspace for full Linked Orders Report.    15 mg Oral EVERY 4 HOURS PRN 09/28/23 1146      09/28/23 0936  methocarbamol (ROBAXIN) tablet 750 mg         750 mg Oral EVERY 6 HOURS 09/28/23 0831      09/28/23 0800  polyethylene glycol (MIRALAX) Packet 17 g         17 g Oral DAILY 09/27/23 1546      09/28/23 0000  acetaminophen (TYLENOL) 325 MG tablet         650 mg Oral EVERY 4 HOURS PRN 09/28/23 1804      09/28/23 0000  senna-docusate (SENOKOT-S/PERICOLACE) 8.6-50 MG tablet        Note to Pharmacy: While taking narcotics    1-2 tablet Oral 2 TIMES DAILY 09/28/23 1804      09/28/23 0000  polyethylene glycol (MIRALAX) 17 g packet         1 packet Oral DAILY 09/28/23 1804      " "09/28/23 0000  oxyCODONE (ROXICODONE) 5 MG tablet         5-10 mg Oral EVERY 4 HOURS PRN 09/28/23 1804      09/27/23 2006  ALPRAZolam (XANAX) tablet 1 mg        Note to Pharmacy: DANIEL Sig:Take 1 mg by mouth 3 times daily      1 mg Oral 3 TIMES DAILY PRN 09/27/23 2010 09/27/23 2000  senna-docusate (SENOKOT-S/PERICOLACE) 8.6-50 MG per tablet 1 tablet        See Hyperspace for full Linked Orders Report.    1 tablet Oral 2 TIMES DAILY 09/27/23 1546      09/27/23 2000  senna-docusate (SENOKOT-S/PERICOLACE) 8.6-50 MG per tablet 2 tablet        See Hyperspace for full Linked Orders Report.    2 tablet Oral 2 TIMES DAILY 09/27/23 1546      09/27/23 2000  acetaminophen (TYLENOL) tablet 975 mg         975 mg Oral EVERY 8 HOURS 09/27/23 1622 09/30/23 1959    09/27/23 1740  lidocaine 1 % 0.1-1 mL         0.1-1 mL Other EVERY 1 HOUR PRN 09/27/23 1740      09/27/23 1740  lidocaine (LMX4) cream          Topical EVERY 1 HOUR PRN 09/27/23 1740      09/27/23 1622  HYDROmorphone (PF) (DILAUDID) injection 0.2 mg        See Hyperspace for full Linked Orders Report.    0.2 mg Intravenous EVERY 2 HOURS PRN 09/27/23 1622      09/27/23 1622  HYDROmorphone (PF) (DILAUDID) injection 0.4 mg        See Hyperspace for full Linked Orders Report.    0.4 mg Intravenous EVERY 2 HOURS PRN 09/27/23 1622      09/27/23 1540  magnesium hydroxide (MILK OF MAGNESIA) suspension 30 mL         30 mL Oral DAILY PRN 09/27/23 1546      09/27/23 1540  bisacodyl (DULCOLAX) suppository 10 mg         10 mg Rectal DAILY PRN 09/27/23 1546              Primary Service Contacted with Recommendations? No            Acute Inpatient Pain Service [facility]  Hours of pain coverage 24/7   Page via Amcom- Please Page the Pain Team Via Amcom: \"PAIN MANAGEMENT ACUTE INPATIENT/ [facility]\"    "

## 2023-09-29 NOTE — PROGRESS NOTES
Rapid Response Team Note    Assessment   In assessment a rapid response was called on Salas Macias due to lactic acidosis. This presentation is likely due to dehydration .      Plan   -  Give LR 500ml IV  -  The Internal Medicine primary team was able to be reached and they are in agreement with the above plan.  -  Disposition: The patient will remain on the current unit. We will continue to monitor this patient closely.  -  Reassessment and plan follow-up will be performed by the primary team      MICKY Valladares  [facility] Job Code Contact #[phone]  [facility] Paging/Directory    Hospital Course   Brief Summary of events leading to rapid response:   Mr. Macias is a 58-year-old gentleman who was admitted to the [facility] team after undergoing a C2 and C7 dome laminectomy.  He has been recovering well postoperatively but has some hypotension secondary to his pain and a heart rate of 100 bpm triggering the sepsis protocol.  Lactic acid returned at 3.2.    Upon arrival to the bed Mr. Macias is resting in bed watching television with no complaints.  He denies any chest pain, shortness of breath, fevers, chills or lightheadedness.  He does report that he has not been drinking water as much as he should and feels a little bit dehydrated.    Admission Diagnosis:   Cervical myelopathy (H) [G95.9]    Physical Exam   Temp: 97  F (36.1  C) Temp  Min: 97  F (36.1  C)  Max: 100.8  F (38.2  C)  Resp: 16 Resp  Min: 16  Max: 18  SpO2: 99 % SpO2  Min: 96 %  Max: 100 %  Pulse: 100 Pulse  Min: 95  Max: 129    No data recorded  BP: 135/87 Systolic (24hrs), Av , Min:135 , Max:171   Diastolic (24hrs), Av, Min:87, Max:99     I/Os: I/O last 3 completed shifts:  In: -   Out: 925 [Urine:850; Drains:75]     Exam:   General: 58-year-old gentleman resting in bed, no acute distress, reports moderately well controlled pain at present  Cardiac: Regular rate and rhythm, no appreciable murmurs rubs or gallops  Lungs:  Breathing comfortably on room air, 97%, no adventitious sounds bilateral auscultation    Significant Results and Procedures   Lactic Acid:   Recent Labs   Lab Test 09/29/23  1652 09/28/23  2326   LACT 3.1* 1.2     CBC:   Recent Labs   Lab Test 09/28/23  0721 09/22/23  1242 09/29/22  1347 04/18/22  1908   WBC  --  8.6 8.9 9.1   HGB 14.0 13.8 14.4 14.6   HCT  --  43.8 45.5 45.9   PLT  --  161 171 192        Sepsis Evaluation   The patient is not known to have an infection.

## 2023-09-29 NOTE — PLAN OF CARE
Patient A&O x4 and able to make his needs known. Denied CP, lightheadedness, dizziness, numbness, tingling.  SOB/THOMAS. Eating,  drinking well and voiding spontaneously without difficulties. CMS intact and pain tolerable and taking Methadone and Tylenol. Ice pack applied to the neck and incentive spirometer encouraged and done several times. Surgical wounds CDI. SYDNIE drains removed today. Repositioned and turned in bed, heels elevated off bed, demonstrates the ability to use call light appropriately. Will continue with POC.

## 2023-09-29 NOTE — PLAN OF CARE
Physical Therapy Discharge Summary    Reason for therapy discharge:    All goals and outcomes met, no further needs identified.    Progress towards therapy goal(s). See goals on Care Plan in [facility] electronic health record for goal details.  Goals met    Therapy recommendation(s):    Continued therapy is recommended.  Rationale/Recommendations:  Initiate outpatient PT when pt is appropriate.

## 2023-09-29 NOTE — PLAN OF CARE
Goal Outcome Evaluation:      Plan of Care Reviewed With: patient             Received  sitting on chair, comfortably. alert and oriented x4, in room air  Complained of pain, managed with Robaxin, Oxycodone  At 2213hrs, temp at 100.8. Lactic acid - 1.2  Voided no difficulty  Cervical dressing is clean and intact, with SYDNIE drain   PIV lines at b/l arms, saline locked  Keep on the same care plan

## 2023-09-29 NOTE — CODE/RAPID RESPONSE
Rapid Response Team Note    Assessment   In assessment a rapid response was called on Salas Macias due to SIRS/Sepsis trigger. This presentation is likely due to decreased PO intake, pod #2, per pt he has been running around all over and not drinking or eating much.    Plan   -  500 ml LR, 12 lead EKG, recheck lactic at   -  The Internal Medicine primary team was paged and currently awaiting a response.  -  Disposition:  Rachel WEEKS of the hospitalist team was notified.   -  Reassessment and plan follow-up will be performed by the primary team      CATA Orlando CNP  [facility] RRT [facility] Job Code Contact #[phone]  [facility] Paging/Directory    Hospital Course   Brief Summary of events leading to rapid response:   Elevated lactic 3.1. pt stable, vss except tachycardic low 100s. Denies pain, fever, sob, or chest pain.This presentation is likely due to decreased PO intake, pod #2, per pt he has been running around all over and not drinking or eating much.    Admission Diagnosis:   Cervical myelopathy (H) [G95.9]    Physical Exam   Temp: 97  F (36.1  C) Temp  Min: 97  F (36.1  C)  Max: 100.8  F (38.2  C)  Resp: 16 Resp  Min: 16  Max: 18  SpO2: 99 % SpO2  Min: 96 %  Max: 100 %  Pulse: 100 Pulse  Min: 95  Max: 129    No data recorded  BP: 135/87 Systolic (24hrs), Av , Min:135 , Max:171   Diastolic (24hrs), Av, Min:87, Max:99     I/Os: I/O last 3 completed shifts:  In: -   Out: 925 [Urine:850; Drains:75]     Exam:   General: in no acute distress  Mental Status: AAOx4.  Lungs sound clear, heart rate regular, ext pulses palpable    Significant Results and Procedures   Lactic Acid:   Recent Labs   Lab Test 23  1652 23  2326   LACT 3.1* 1.2     CBC:   Recent Labs   Lab Test 23  0721 23  1242 22  1347 22  1908   WBC  --  8.6 8.9 9.1   HGB 14.0 13.8 14.4 14.6   HCT  --  43.8 45.5 45.9   PLT  --  161 171 192        Sepsis Evaluation   The patient is not known to have  an infection.  NO EVIDENCE OF SEPSIS at this time.  Vital sign, physical exam, and lab findings are due to low po intake..

## 2023-09-29 NOTE — PROGRESS NOTES
[facility]    Medicine Progress Note - Hospitalist Service, GOLD TEAM 21    Date of Admission:  9/27/2023    Assessment & Plan   Salas Macias is a 58 year old male admitted on 9/27/2023 s/p C2 and C7 dome laminectomies and C3-C6 open door laminoplasty.      Cervical Stenosis  Cervical Myelopathy  S/p C2 and C7 dome laminectomies and C3-C6 open door laminoplasty on 9/27 with Dr. Sullivan. EBL 50mL.   - Pain control per primary. Resume methadone at PTA dosing as below. Additional agents include Muscle relaxers, lidocaine, PCA (being weaned)   - DVT ppx per primary  - Hgb stable this AM.   - PT/OT  - Bowel regimen     Hx. Opioid Use on Chronic Methadone   - Methadone dose increase prior to surgery  - Give Methadone 40 mg x1 dose tonight       Paranoid Schizophrenia  - Zyprexa 20mg qHS     Depression  Anxiety  - Continue PTA Wellbutrin 150mg qAM and Xanax 1 mg TID PRN for anxiety     Hypertension  -Started on nifedipine   - Tachyarrhythmia- Will start on secondary HTN agent that is directed at HR  - PRN hydralazine     Hyperlipidemia  - Stopped taking Lipitor     GERD  - Symptoms controlled off of medications     Prediabetes  - A1c 6.1% in 2022  - Diet controlled       Diet: Advance Diet as Tolerated: Regular Diet Adult  Snacks/Supplements Adult: Other; Fruit Punch Carlos BID at 10 am and 2 pm + strawberry Ensure once daily at 2 pm; Between Meals  Discharge Instruction - Regular Diet Adult    DVT Prophylaxis: Low Risk/Ambulatory with no VTE prophylaxis indicated  Dunn Catheter: Not present  Lines: None     Cardiac Monitoring: None  Code Status: Full Code      Clinically Significant Risk Factors                                    Disposition Plan     Expected Discharge Date: 10/01/2023                  Antoine Yang MD  Hospitalist Service, GOLD TEAM 21  [facility]  Securely message with [employer] (more info)  Text page via [employer]  Paging/Directory   See signed in provider for up to date coverage information  ______________________________________________________________________    Interval History   Pain better controlled  No fever or chills  No CP or SOB. Off oxygen    Physical Exam   Vital Signs: Temp: 98  F (36.7  C) Temp src: Oral BP: (!) 157/90 Pulse: 110   Resp: 18 SpO2: 100 % O2 Device: None (Room air)    Weight: 173 lbs 8.03 oz    Physical Exam  Constitutional:       Appearance: Normal appearance.   HENT:      Head: Normocephalic.      Mouth/Throat:      Mouth: Mucous membranes are moist.   Cardiovascular:      Rate and Rhythm: Normal rate and regular rhythm.      Heart sounds: No murmur heard.     No friction rub. No gallop.   Pulmonary:      Effort: Pulmonary effort is normal. No respiratory distress.      Breath sounds: No stridor. No wheezing.   Abdominal:      General: Abdomen is flat. There is no distension.      Palpations: There is no mass.      Tenderness: There is no abdominal tenderness.   Musculoskeletal:      Right lower leg: No edema.      Left lower leg: No edema.   Neurological:      General: No focal deficit present.      Mental Status: He is alert and oriented to person, place, and time.           Medical Decision Making       **CLEAR ALL SELECTIONS**      Data     I have personally reviewed the following data over the past 24 hrs:    Procal: N/A CRP: N/A Lactic Acid: 1.2

## 2023-09-30 VITALS
TEMPERATURE: 98.6 F | HEIGHT: 73 IN | RESPIRATION RATE: 17 BRPM | WEIGHT: 173.5 LBS | DIASTOLIC BLOOD PRESSURE: 88 MMHG | SYSTOLIC BLOOD PRESSURE: 114 MMHG | OXYGEN SATURATION: 97 % | HEART RATE: 111 BPM | BODY MASS INDEX: 22.99 KG/M2

## 2023-09-30 LAB
ANION GAP SERPL CALCULATED.3IONS-SCNC: 10 MMOL/L (ref 7–15)
BUN SERPL-MCNC: 12.3 MG/DL (ref 6–20)
CALCIUM SERPL-MCNC: 9.4 MG/DL (ref 8.6–10)
CHLORIDE SERPL-SCNC: 99 MMOL/L (ref 98–107)
CREAT SERPL-MCNC: 0.84 MG/DL (ref 0.67–1.17)
DEPRECATED HCO3 PLAS-SCNC: 29 MMOL/L (ref 22–29)
EGFRCR SERPLBLD CKD-EPI 2021: >90 ML/MIN/1.73M2
ERYTHROCYTE [DISTWIDTH] IN BLOOD BY AUTOMATED COUNT: 14.8 % (ref 10–15)
GLUCOSE SERPL-MCNC: 199 MG/DL (ref 70–99)
HCT VFR BLD AUTO: 43.1 % (ref 40–53)
HGB BLD-MCNC: 14.2 G/DL (ref 13.3–17.7)
LACTATE SERPL-SCNC: 1.2 MMOL/L (ref 0.7–2)
MCH RBC QN AUTO: 29.2 PG (ref 26.5–33)
MCHC RBC AUTO-ENTMCNC: 32.9 G/DL (ref 31.5–36.5)
MCV RBC AUTO: 89 FL (ref 78–100)
PLATELET # BLD AUTO: 175 10E3/UL (ref 150–450)
POTASSIUM SERPL-SCNC: 4.3 MMOL/L (ref 3.4–5.3)
RBC # BLD AUTO: 4.86 10E6/UL (ref 4.4–5.9)
SODIUM SERPL-SCNC: 138 MMOL/L (ref 135–145)
WBC # BLD AUTO: 16.1 10E3/UL (ref 4–11)

## 2023-09-30 PROCEDURE — 85027 COMPLETE CBC AUTOMATED: CPT | Performed by: INTERNAL MEDICINE

## 2023-09-30 PROCEDURE — 250N000013 HC RX MED GY IP 250 OP 250 PS 637: Performed by: STUDENT IN AN ORGANIZED HEALTH CARE EDUCATION/TRAINING PROGRAM

## 2023-09-30 PROCEDURE — 36415 COLL VENOUS BLD VENIPUNCTURE: CPT | Performed by: PHYSICIAN ASSISTANT

## 2023-09-30 PROCEDURE — 250N000013 HC RX MED GY IP 250 OP 250 PS 637: Performed by: ORTHOPAEDIC SURGERY

## 2023-09-30 PROCEDURE — 250N000011 HC RX IP 250 OP 636: Performed by: ORTHOPAEDIC SURGERY

## 2023-09-30 PROCEDURE — 250N000013 HC RX MED GY IP 250 OP 250 PS 637: Performed by: PHYSICIAN ASSISTANT

## 2023-09-30 PROCEDURE — 999N000123 HC STATISTIC OXYGEN O2DAILY TECH TIME

## 2023-09-30 PROCEDURE — 80048 BASIC METABOLIC PNL TOTAL CA: CPT | Performed by: INTERNAL MEDICINE

## 2023-09-30 PROCEDURE — 99232 SBSQ HOSP IP/OBS MODERATE 35: CPT | Performed by: INTERNAL MEDICINE

## 2023-09-30 PROCEDURE — 36415 COLL VENOUS BLD VENIPUNCTURE: CPT | Performed by: INTERNAL MEDICINE

## 2023-09-30 PROCEDURE — 83605 ASSAY OF LACTIC ACID: CPT | Performed by: PHYSICIAN ASSISTANT

## 2023-09-30 PROCEDURE — 250N000013 HC RX MED GY IP 250 OP 250 PS 637: Performed by: INTERNAL MEDICINE

## 2023-09-30 RX ORDER — METHOCARBAMOL 750 MG/1
750 TABLET, FILM COATED ORAL EVERY 6 HOURS
Qty: 56 TABLET | Refills: 0 | Status: SHIPPED | OUTPATIENT
Start: 2023-09-30 | End: 2023-10-14

## 2023-09-30 RX ORDER — CARVEDILOL 12.5 MG/1
12.5 TABLET ORAL 2 TIMES DAILY WITH MEALS
Status: DISCONTINUED | OUTPATIENT
Start: 2023-09-30 | End: 2023-09-30 | Stop reason: HOSPADM

## 2023-09-30 RX ORDER — CARVEDILOL 12.5 MG/1
12.5 TABLET ORAL 2 TIMES DAILY WITH MEALS
Qty: 60 TABLET | Refills: 0 | Status: SHIPPED | OUTPATIENT
Start: 2023-09-30 | End: 2023-10-30

## 2023-09-30 RX ORDER — NIFEDIPINE 30 MG
30 TABLET, EXTENDED RELEASE ORAL DAILY
Qty: 30 TABLET | Refills: 0 | Status: SHIPPED | OUTPATIENT
Start: 2023-09-30 | End: 2023-10-30

## 2023-09-30 RX ADMIN — CEFAZOLIN 1 G: 1 INJECTION, POWDER, FOR SOLUTION INTRAMUSCULAR; INTRAVENOUS at 04:15

## 2023-09-30 RX ADMIN — NIFEDIPINE 30 MG: 30 TABLET, FILM COATED, EXTENDED RELEASE ORAL at 09:42

## 2023-09-30 RX ADMIN — CARVEDILOL 12.5 MG: 12.5 TABLET, FILM COATED ORAL at 09:43

## 2023-09-30 RX ADMIN — FAMOTIDINE 20 MG: 20 TABLET ORAL at 09:43

## 2023-09-30 RX ADMIN — SENNOSIDES AND DOCUSATE SODIUM 1 TABLET: 50; 8.6 TABLET ORAL at 09:43

## 2023-09-30 RX ADMIN — ACETAMINOPHEN 975 MG: 325 TABLET, FILM COATED ORAL at 04:14

## 2023-09-30 RX ADMIN — METHADONE HYDROCHLORIDE 40 MG: 10 CONCENTRATE ORAL at 11:00

## 2023-09-30 RX ADMIN — METHOCARBAMOL TABLETS 750 MG: 750 TABLET, COATED ORAL at 04:14

## 2023-09-30 RX ADMIN — METHOCARBAMOL TABLETS 750 MG: 750 TABLET, COATED ORAL at 09:42

## 2023-09-30 RX ADMIN — BUPROPION HYDROCHLORIDE 150 MG: 150 TABLET, FILM COATED, EXTENDED RELEASE ORAL at 09:43

## 2023-09-30 ASSESSMENT — ACTIVITIES OF DAILY LIVING (ADL)
ADLS_ACUITY_SCORE: 31

## 2023-09-30 NOTE — DISCHARGE SUMMARY
ORTHOPAEDIC DISCHARGE SUMMARY     Date of Admission: 9/27/2023  Date of Discharge: 9/30/2023  Disposition: Home  Staff Physician: Anirudh Sullivan*  Primary Care Provider: Abram Bagley    DISCHARGE DIAGNOSIS:  Cervical myelopathy (H) [G95.9]    PROCEDURES: Procedure(s):  Cervical 2 and 7 Dome Laminectomies and C3-6 Laminoplasty with [employer] Implants  on 9/27/2023    BRIEF HISTORY:  Patient is a 59 yo male who presented with myelopathy, underwent above listed procedure without noted complication.     HOSPITAL COURSE:    Surgery was uncomplicated. Salas Macias has done well post-operatively. Medicine was consulted post operatively to aid in management of medical comorbidities. See final recommendations below. The patient received routine nursing cares and is medically stable. Vital signs are stable. The patient is tolerating a regular diet without GI distress/nausea or vomiting. Voiding spontaneously. All PT/OT goals have been met for safe mobility. Pain is now controlled on oral medications which will be available on discharge. Stool softeners have been used while taking pain medications to help prevent constipation. Salas Macias is deemed medically safe to discharge.     Antibiotics:  Ancef given periop and 24 hours postop.  DVT prophylaxis:  Ambulatory with SCD's  PT Progress:  Has met PT/OT goals for safe mobility.   Pain Meds:  Weaned off all IV pain meds by discharge.  Inpatient Events: No significant events or complications.     Discharge orders and instructions as below.    FOLLOWUP:    Future Appointments   Date Time Provider Department Center   11/7/2023  1:30 PM [facility]ud[facility]       Appointments on [facility]. Call [phone] if you haven't heard regarding these appointments within 7 days of discharge.    PLANNED DISCHARGE ORDERS:     DVT Prophylaxis: Ambulatory      Current Discharge Medication List        START taking  these medications    Details   acetaminophen (TYLENOL) 325 MG tablet Take 2 tablets (650 mg) by mouth every 4 hours as needed for other (mild pain)  Qty: 100 tablet, Refills: 0    Associated Diagnoses: Cervical myelopathy (H)      carvedilol (COREG) 12.5 MG tablet Take 1 tablet (12.5 mg) by mouth 2 times daily (with meals) for 30 days  Qty: 60 tablet, Refills: 0    Associated Diagnoses: Primary hypertension      methocarbamol (ROBAXIN) 750 MG tablet Take 1 tablet (750 mg) by mouth every 6 hours for 14 days  Qty: 56 tablet, Refills: 0    Associated Diagnoses: Cervical myelopathy (H)      NIFEdipine ER OSMOTIC (ADALAT CC) 30 MG 24 hr tablet Take 1 tablet (30 mg) by mouth daily for 30 days  Qty: 30 tablet, Refills: 0    Associated Diagnoses: Primary hypertension      oxyCODONE (ROXICODONE) 5 MG tablet Take 1-2 tablets (5-10 mg) by mouth every 4 hours as needed for moderate to severe pain  Qty: 30 tablet, Refills: 0    Associated Diagnoses: Cervical myelopathy (H)      polyethylene glycol (MIRALAX) 17 g packet Take 17 g by mouth daily  Qty: 7 packet, Refills: 0    Associated Diagnoses: Cervical myelopathy (H)      senna-docusate (SENOKOT-S/PERICOLACE) 8.6-50 MG tablet Take 1-2 tablets by mouth 2 times daily Take while on oral narcotics to prevent or treat constipation.  Qty: 30 tablet, Refills: 0    Comments: While taking narcotics  Associated Diagnoses: Cervical myelopathy (H)           CONTINUE these medications which have NOT CHANGED    Details   ALPRAZolam (XANAX) 1 MG tablet Take 1 mg by mouth 3 times daily      buPROPion (WELLBUTRIN XL) 150 MG 24 hr tablet Take 150 mg by mouth every morning      methadone (DOLOPHINE-INTENSOL) 10 MG/ML (HIGH CONC) solution Take 40 mg by mouth daily Dose verified on September 20, 2023 - note that patient may be going up on his dose to 40 mg daily in near future so please re-verify      nicotine polacrilex (NICORETTE) 4 MG gum Chew one piece every 1-2 hours as directed.  Qty: 100  "each, Refills: 3    Associated Diagnoses: Tobacco abuse      !! OLANZapine (ZYPREXA) 10 MG tablet Take 10 mg by mouth daily as needed      !! OLANZapine (ZYPREXA) 20 MG tablet Take 20 mg by mouth At Bedtime      vitamin D3 (CHOLECALCIFEROL) 50 mcg (2000 units) tablet Take 1 tablet (50 mcg) by mouth daily  Qty: 90 tablet, Refills: 3    Associated Diagnoses: Vitamin D deficiency      aspirin (ASA) 81 MG chewable tablet Take 1 tablet (81 mg) by mouth daily  Qty: 30 tablet, Refills: 4    Associated Diagnoses: Homonymous hemianopia, left; Occipital infarction (H)      atorvastatin (LIPITOR) 20 MG tablet Take 1 tablet (20 mg) by mouth daily  Qty: 30 tablet, Refills: 4    Associated Diagnoses: Homonymous hemianopia, left; Occipital infarction (H)       !! - Potential duplicate medications found. Please discuss with provider.            Discharge Procedure Orders   Reason for your hospital stay   Order Comments: Neck Laminoplasty     When to call - Contact Surgeon Team   Order Comments: You may experience symptoms that require follow-up before your scheduled appointment. Refer to the \"Stoplight Tool\" for instructions on when to contact your Surgeon Team if you are concerned about pain control, blood clots, constipation, or if you are unable to urinate.     When to call - Reach out to Urgent Care   Order Comments: If you are not able to reach your Surgeon Team and you need immediate care, go to the Orthopedic Walk-in Clinic or Urgent Care at your Surgeon's office.  Do NOT go to the Emergency Room unless you have shortness of breath, chest pain, or other signs of a medical emergency.     When to call - Reasons to Call 911   Order Comments: Call 911 immediately if you experience sudden-onset chest pain, arm weakness/numbness, slurred speech, or shortness of breath     Discharge Instruction - Breathing exercises   Order Comments: Perform breathing exercises using your Incentive Spirometer 10 times per hour while awake for 2 " weeks.     Symptoms - Fever Management   Order Comments: A low grade fever can be expected after surgery.  Use acetaminophen (TYLENOL) as needed for fever management.  Contact your Surgeon Team if you have a fever greater than 101.5 F, chills, and/or night sweats.     Symptoms - Constipation management   Order Comments: Constipation (hard, dry bowel movements) is expected after surgery due to the combination of being less active, the anesthetic, and the opioid pain medication.  You can do the following to help reduce constipation:  ~  FLUIDS:  Drink clear liquids (water or Gatorade), or juice (apple/prune).  ~  DIET:  Eat a fiber rich diet.    ~  ACTIVITY:  Get up and move around several times a day.  Increase your activity as you are able.  MEDICATIONS:  Reduce the risk of constipation by starting medications before you are constipated.  You can take Miralax   (1 packet as directed) and/or a stool softener (Senokot 1-2 tablets 1-2 times a day).  If you already have constipation and these medications are not working, you can get magnesium citrate and use as directed.  If you continue to have constipation you can try an over the counter suppository or enema.  Call your Surgeon Team if it has been greater than 3 days since your last bowel movement.     Symptoms - Reduced Urine Output   Order Comments: Changes in the amount of fluids you drank before and after surgery may result in problems urinating.  It is important to stay well-hydrated after surgery and drink plenty of water. If it has been greater than 8 hours since you have urinated despite drinking plenty of water, call your Surgeon Team.     Comfort and Pain Management - Pain after Surgery   Order Comments: Pain after surgery is normal and expected.  You will have some amount of pain for several weeks after surgery.  Your pain will improve with time.  There are several things you can do to help reduce your pain including: rest, ice, elevation, and using pain  medications as needed. Contact your Surgeon Team if you have pain that persists or worsens after surgery despite rest, ice, elevation, and taking your medication(s) as prescribed. Contact your Surgeon Team if you have new numbness, tingling, or weakness in your operative extremity.     Comfort and Pain Management - Swelling after Surgery   Order Comments: Swelling and/or bruising of the surgical extremity is common and may persist for several months after surgery. In addition to frequent icing and elevation, gentle compressive support with an ACE wrap or tubigrip may help with swelling. Apply compression regularly, removing at least twice daily to perform skin checks. Contact your Surgeon Team if your swelling increases and is NOT associated with an increase in your activity level, or if your swelling increases and is associated with redness and pain.     Comfort and Pain Management - Cold therapy   Order Comments: Ice can be used to control swelling and discomfort after surgery. Place a thin towel over your operative site and apply the ice pack overtop. Leave ice pack in place for 20 minutes, then remove for 20 minutes. Repeat this 20 minutes on/20 minutes off routine as often as tolerated.     Medication Instructions - Acetaminophen (TYLENOL) Instructions   Order Comments: You were discharged with acetaminophen (TYLENOL) for pain management after surgery. Acetaminophen most effectively manages pain symptoms when it is taken on a schedule without missing doses (every four, six, or eight hours). Your Provider will prescribe a safe daily dose between 3000 - 4000 mg.  Do NOT exceed this daily dose. Most patients use acetaminophen for pain control for the first four weeks after surgery.  You can wean from this medication as your pain decreases.     Medication Instructions - NSAID Instructions   Order Comments: You were discharged with an anti-inflammatory medication for pain management to use in combination with  acetaminophen (TYLENOL) and the narcotic pain medication.  Take this medication exactly as directed.  You should only take one anti-inflammatory at a time.  Some common anti-inflammatories include: ibuprofen (ADVIL, MOTRIN), naproxen (ALEVE, NAPROSYN), celecoxib (CELEBREX), meloxicam (MOBIC), ketorolac (TORADOL).  Take this medication with food and water.     Medication Instructions - Opioids - Tapering Instructions   Order Comments: In the first three days following surgery, your symptoms may warrant use of the narcotic pain medication every four to six hours as prescribed. This is normal. As your pain symptoms improve, focus your efforts on decreasing (tapering) use of narcotic medications. The most successful tapering strategy is to first, decrease the number of tablets you take every 4-6 hours to the minimum prescribed. Then, increase the amount of time between doses.  For example:  First, taper to   or 1 tablet every 4-6 hours.  Then, taper to   or 1 tablet every 6-8 hours.  Then, taper to   or 1 tablet every 8-10 hours.  Then, taper to   or 1 tablet every 10-12 hours.  Then, taper to   or 1 tablet at bedtime.  The bedtime dose can help with comfort during sleep and is typically the last dose to be discontinued after surgery.     Follow Up Care   Order Comments: Follow-up with your Surgeon Team in 6 weeks     Medication Instructions - Opioid Instructions (1 - 2 tablets Q 4-6 hours, MAX 6 tablets)   Order Comments: You were discharged with an opioid medication (hydromorphone, oxycodone, hydrocodone, or tramadol). This medication should only be taken for breakthrough pain that is not controlled with acetaminophen (TYLENOL). If you rate your pain less than 3 you do not need this medication.  Pain rating 0-3:  You do not need this medication.  Pain rating 4-6:  Take 1 tablet every 4-6 hours as needed  Pain rating 7-10:  Take 2 tablets every 4-6 hours as needed.  Do not exceed 6 tablets per day     No lifting    Order Comments: No lifting over 10 pounds and no strenuous physical activity for 12 weeks.     Order Specific Question Answer Comments   Is discharge order? Yes      Discharge Instruction - Regular Diet Adult   Order Comments: Return to your pre-surgery diet unless instructed otherwise     Order Specific Question Answer Comments   Is discharge order? Yes        Filipe Guy,   Spine Fellow

## 2023-09-30 NOTE — DISCHARGE SUMMARY
Pt discharged 1210 with accompanied by RN. Pt is ambulatory and has ride home with family. Both PIV's removed. AVS and discharge meds sent with patient. AVS signature and signed medication receipts in pt binder.

## 2023-09-30 NOTE — PROGRESS NOTES
[facility]    Medicine Progress Note - Hospitalist Service, GOLD TEAM 21    Date of Admission:  9/27/2023    Assessment & Plan   Salas Macias is a 58 year old male admitted on 9/27/2023 s/p C2 and C7 dome laminectomies and C3-C6 open door laminoplasty.     Changes today  Okay with discharge  Coreg increased.   Discharge on Nifedipine and coreg (order in discharged med rec)  Patient would benefit from muscle relaxer. Ordered for you on discharge.        Cervical Stenosis  Cervical Myelopathy  S/p C2 and C7 dome laminectomies and C3-C6 open door laminoplasty on 9/27 with Dr. Sullivan. EBL 50mL.   - Pain control per primary. Resume methadone at PTA dosing as below. Additional agents include Muscle relaxers, lidocaine, PCA (being weaned)   - DVT ppx per primary  - Hgb stable this AM.   - PT/OT  - Bowel regimen     Hx. Opioid Use on Chronic Methadone   - Methadone dose increase prior to surgery  - Give Methadone 40 mg x1 dose tonight       Paranoid Schizophrenia  - Zyprexa 20mg qHS     Depression  Anxiety  - Continue PTA Wellbutrin 150mg qAM and Xanax 1 mg TID PRN for anxiety     Hypertension  -Started on nifedipine   - Tachyarrhythmia- Will start on secondary HTN agent that is directed at HR  - PRN hydralazine     Hyperlipidemia  - Stopped taking Lipitor     GERD  - Symptoms controlled off of medications     Prediabetes  - A1c 6.1% in 2022  - Diet controlled       Diet: Advance Diet as Tolerated: Regular Diet Adult  Snacks/Supplements Adult: Other; Fruit Punch Carlos BID at 10 am and 2 pm + strawberry Ensure once daily at 2 pm; Between Meals  Discharge Instruction - Regular Diet Adult    DVT Prophylaxis: Low Risk/Ambulatory with no VTE prophylaxis indicated  Dunn Catheter: Not present  Lines: None     Cardiac Monitoring: None  Code Status: Full Code      Clinically Significant Risk Factors                                    Disposition Plan      Expected Discharge Date:  09/30/2023,  9:00 AM                Antoine Yang MD  Hospitalist Service, GOLD TEAM 21  [facility]  Securely message with [employer] (more info)  Text page via [employer] Paging/Directory   See signed in provider for up to date coverage information  ______________________________________________________________________    Interval History   Pain better controlled  No fever or chills  No CP or SOB. Off oxygen    Physical Exam   Vital Signs: Temp: 98.6  F (37  C) Temp src: Axillary BP: 114/88 Pulse: 111   Resp: 17 SpO2: 97 % O2 Device: None (Room air)    Weight: 173 lbs 8.03 oz    Physical Exam  Constitutional:       Appearance: Normal appearance.   HENT:      Head: Normocephalic.      Mouth/Throat:      Mouth: Mucous membranes are moist.   Cardiovascular:      Rate and Rhythm: Normal rate and regular rhythm.      Heart sounds: No murmur heard.     No friction rub. No gallop.   Pulmonary:      Effort: Pulmonary effort is normal. No respiratory distress.      Breath sounds: No stridor. No wheezing.   Abdominal:      General: Abdomen is flat. There is no distension.      Palpations: There is no mass.      Tenderness: There is no abdominal tenderness.   Musculoskeletal:      Right lower leg: No edema.      Left lower leg: No edema.   Neurological:      General: No focal deficit present.      Mental Status: He is alert and oriented to person, place, and time.           Medical Decision Making       **CLEAR ALL SELECTIONS**      Data     I have personally reviewed the following data over the past 24 hrs:    16.1 (H)  \   14.2   / 175     138 99 12.3 /  199 (H)   4.3 29 0.84 \     Procal: N/A CRP: N/A Lactic Acid: 1.2

## 2023-09-30 NOTE — PROGRESS NOTES
"Orthopedic Surgery Progress Note:     Subjective:   No acute overnight event.  Pain tolerable this morning.  Seated at bedside chair.  States he is eager to get discharged home.  Has no new concerns at this time.  Denies any chest pain shortness of breath fevers chills nausea vomiting at this time.  Patient has had a bowel movement and is tolerating diet.    Objective:   /87 (BP Location: Left arm, Patient Position: Supine, Cuff Size: Adult Regular)   Pulse 100   Temp 97  F (36.1  C) (Oral)   Resp 16   Ht 1.854 m (6' 0.99\")   Wt 78.7 kg (173 lb 8 oz)   SpO2 99%   BMI 22.90 kg/m    No intake/output data recorded.  General: NAD. Resting comfortably in bed.  Respiratory: Breathing comfortably on RA.  Drain Output: 40/15 over last 2 shifts.  Musculoskeletal:       Motor Strength Right Left   Deltoids: C5 5/5 5/5   Biceps: C5 5/5 5/5   Brachialis: C6 5/5 5/5   Wrist extension: C6 5/5 5/5   Triceps: C7  5/5 5/5   Wrist flexion: C7 5/5 5/5    strength: C8 5/5 5/5   Hand intrinsics: T1 5/5 5/5      Sensation from C4-L1 is preserved.    Laboratory Data:  Lab Results   Component Value Date    WBC 8.6 09/22/2023    HGB 14.0 09/28/2023     09/22/2023       Images:  Narrative & Impression   Exam: XR CERVICAL SPINE 2/3 VIEWS, 9/28/2023 3:05 PM     Comparison: Cervical spine     History: post op     Findings:  There is normal cervical spine alignment. There is no fracture or  dislocation of the cervical spine. Postoperative changes of open door  laminoplasty from C3 through C6.. There is a surgical drain overlying  the right dorsal aspect of the spine. Small amount of gas within the  surgical region. There is preservation of vertebral body height.  Multilevel disc space narrowing, most pronounced at C3-4 and C4-5.  Facet arthropathy bilaterally. Anterior endplate osteophytes seen  throughout the cervical spine. The atlantodental distance is normal.  There is no prevertebral soft tissue swelling. The " partially  visualized lung apices appear clear.                                                                       Impression:   1. Postsurgical changes of open door laminoplasty from C3 through C6  with a overlying surgical drain.  2. Multilevel lumbar spondylosis.       Assessment & Plan:   Salas Macias is a 58 year old male with PMH including cervical myelopathy now s/p C3-6 laminoplasty on 9/27/23 with Dr. Sullivan.      Goals for today:  Discharge home today.     Spine Primary  - Activity: Up with assist until independent. No excessive bending or twisting. No lifting >10 lbs x 6 weeks. No Vikram lift for transfers.   - Weightbearing Status: WBAT.  - Pain Management: Transition from IV to PO as tolerated.   - Antibiotics: Discontinued  - Diet: Begin with clear fluids and progress diet as tolerated.   - DVT Prophylaxis: SCDs only. No chemical DVT prophylaxis needed.  - Imaging: XR Upright completed  - Labs: Labs PRN.  - Bracing/Splinting: None.  - Dressings: Keep Aquacel C/D/I x 7 days.  - Drains: Drains removed  - Physical Therapy/Occupational Therapy: Evaluation and treatment.  - Consults: Hospitalist.  - Follow-up: Clinic with Dr. Sullivan in 6 weeks with repeat radiographs.     - Disposition: Patient is doing quite well at this time.  Pain controlled tolerating diet having bowel movements and ambulating independently.  Has been discharged from physical therapy and Occupational Therapy at this time.  Physical therapy recommending outpatient therapy and discharge home.    We will work towards discharge today.     Orthopedic surgery staff for this patient is Dr. Sullivan.       Filipe Guy DO  Spine Fellow       FOLLOWUP:    Future Appointments   Date Time Provider Department Center   9/29/2023  8:45 AM Ting Carlos, PT [facility] [place]   9/29/2023  7:00 PM Marizol Dang OT [facility] [place]   11/7/2023  1:30 PM Anirudh Sullivan MD [facility]

## 2023-09-30 NOTE — PROGRESS NOTES
Assumed care of patient around 2000 and patient A&O times 4; c/o pain to posterior neck. IV antibiotic infused and tolerated PO meds well. Decreased in pain post pain med intervention. PIV times 2 patent and intact and saline locked. Posterior neck dressing CD&I and soft collar on. Visitor present in room with plan for overnight stay. Continue with patent care.

## 2023-10-01 ENCOUNTER — NURSE TRIAGE (OUTPATIENT)
Dept: NURSING | Facility: CLINIC | Age: 58
End: 2023-10-01
Payer: COMMERCIAL

## 2023-10-02 ENCOUNTER — TELEPHONE (OUTPATIENT)
Dept: ORTHOPEDICS | Facility: CLINIC | Age: 58
End: 2023-10-02
Payer: COMMERCIAL

## 2023-10-02 LAB
ATRIAL RATE - MUSE: 104 BPM
DIASTOLIC BLOOD PRESSURE - MUSE: NORMAL MMHG
INTERPRETATION ECG - MUSE: NORMAL
P AXIS - MUSE: 69 DEGREES
PR INTERVAL - MUSE: 124 MS
QRS DURATION - MUSE: 108 MS
QT - MUSE: 300 MS
QTC - MUSE: 394 MS
R AXIS - MUSE: 55 DEGREES
SYSTOLIC BLOOD PRESSURE - MUSE: NORMAL MMHG
T AXIS - MUSE: 5 DEGREES
VENTRICULAR RATE- MUSE: 104 BPM

## 2023-10-02 NOTE — TELEPHONE ENCOUNTER
[facility] Call Center     Phone Message     May a detailed message be left on voicemail: Yes     Reason for Call:   Pt had question regarding when he should change is bandages and how to change them.

## 2023-10-02 NOTE — TELEPHONE ENCOUNTER
"Pt calling with questions about medications. Verbal consent given to communicate with friend, Ely. Currently pt is not symptomatic.     Pt friend is concerned why pt was discharged with so much pain medications.   States pt was discharged with \"so much drugs\" and had side effects.   Ely is concerned that \"975 mg tylenol was so much\".   Ely is also upset that pt was not discharged on antibiotics to prevent infection, instead pain meds to overdose.    States last night pt was acting weird, running around all day, EMS was called, looked over his discharge summary and said he is overdosing, taking too much pain meds.     Ely is questioning, who ordered all these pain meds, because provider did not see pt throughout hospital course. States every time pt received meds, Pt behavior was different, he was a different person. Feels that pt was overdosed during hospital stay also.    Ely also have Concerns about staffing there, felt like pt was not cared for appropriately, staff did not seem concern about pt's condition.    Reviewed meds on AVS with friend, tried to Explain that pt behavior may be related to other factors, such as underlying conditions and labs. Ely continued to cut me off, stating she wants to speak with the provider who prescribed pt's meds or charge nurse.   I advised to follow up with PCP to discuss meds and further evaluation of pt's condition. Gave Patient Relations contact number [phone], available 8-430 PM M-F to call and speak with regarding concerns and pt's experience.    Advised to call back if pt have symptoms, or with questions/concerns.    Aidan Wiseman, RN, BSN  10/2/2023 at 12:33 AM  [facility]         Reason for Disposition   [1] Caller has NON-URGENT medicine question about med that PCP prescribed AND [2] triager unable to answer question    Protocols used: Medication Question Call-A-AH    "

## 2023-10-02 NOTE — TELEPHONE ENCOUNTER
"RN called patient back to discuss discharge paperwork and instructions on showering, brace and wound care. Patient has his aquacel dressing in place and was instructed to take that off on day 7. Patient can then place gauze and tape over the incision when wearing his brace to avoid friction and irritation.     Patient had no complaints of severe pain and it doing \"good\".    Encouraged patient to call back with further questions or concerns.     Jaqui Goode RN    "

## 2023-11-01 ENCOUNTER — TELEPHONE (OUTPATIENT)
Dept: ORTHOPEDICS | Facility: CLINIC | Age: 58
End: 2023-11-01
Payer: COMMERCIAL

## 2023-11-01 NOTE — TELEPHONE ENCOUNTER
ATC called patient back, no answer, voicemail not set up.  MyChart not active for patient.  Will hope that patient calls us back.  Patient is seeing Dr. Sullivan next Tuesday, which is next clinic in [place].  We could offer patient appointment in [place] for 11/3/23 with Dr. Sullivan, as there are 4 openings right now.  Patient may not want that as they are coming from [place], but it is an option if they need to be seen right away.    Filipe Dietrich, ATC

## 2023-11-01 NOTE — TELEPHONE ENCOUNTER
[facility] Call Center    Phone Message    May a detailed message be left on voicemail: yes     Reason for Call: Pt believes one of his vertebrae has popped loose, because his hand has started back hurting for four days. Would like a call back regarding this    Action Taken: Other: [facility]    Travel Screening: Not Applicable

## 2023-11-06 DIAGNOSIS — Z98.890 H/O CERVICAL SPINE SURGERY: Primary | ICD-10-CM

## 2023-11-07 ENCOUNTER — OFFICE VISIT (OUTPATIENT)
Dept: ORTHOPEDICS | Facility: CLINIC | Age: 58
End: 2023-11-07
Payer: COMMERCIAL

## 2023-11-07 ENCOUNTER — ANCILLARY PROCEDURE (OUTPATIENT)
Dept: GENERAL RADIOLOGY | Facility: CLINIC | Age: 58
End: 2023-11-07
Attending: ORTHOPAEDIC SURGERY
Payer: COMMERCIAL

## 2023-11-07 DIAGNOSIS — Z98.890 H/O CERVICAL SPINE SURGERY: ICD-10-CM

## 2023-11-07 DIAGNOSIS — G95.9 CERVICAL MYELOPATHY (H): ICD-10-CM

## 2023-11-07 DIAGNOSIS — Z98.890 H/O CERVICAL SPINE SURGERY: Primary | ICD-10-CM

## 2023-11-07 PROCEDURE — 99024 POSTOP FOLLOW-UP VISIT: CPT | Performed by: ORTHOPAEDIC SURGERY

## 2023-11-07 PROCEDURE — 72050 X-RAY EXAM NECK SPINE 4/5VWS: CPT | Performed by: RADIOLOGY

## 2023-11-07 RX ORDER — GABAPENTIN 300 MG/1
CAPSULE ORAL
Qty: 90 CAPSULE | Refills: 0 | Status: SHIPPED | OUTPATIENT
Start: 2023-11-07 | End: 2023-12-19

## 2023-11-07 NOTE — LETTER
11/7/2023         RE: Salas Macias  [address] [address]        Dear Colleague,    Thank you for referring your patient, Salas Macias, to the [facility] [place]. Please see a copy of my visit note below.    Spine Surgery Return Clinic Visit      Chief Complaint:   RECHECK (Patient returns 6w s/p C2 and 7 dome laminectomies and C3-6 laminoplasty.  He states 1-2 weeks ago he felt crepitus in neck and a return of hand numbness.)      Interval HPI:  Symptom Profile Including: location of symptoms, onset, severity, exacerbating/alleviating factors, previous treatments:        Salas Macias is a 58 year old male who returns 6 weeks status post cervical laminoplasty.  He says his hands feel about the same as they did before surgery with some tingling in the hands.  His neck pain has been improving.            Past Medical History:     Past Medical History:   Diagnosis Date    Anxiety     Cervical myelopathy     Depression     HLD (hyperlipidemia)     Paranoid schizophrenia (H)             Past Surgical History:     Past Surgical History:   Procedure Laterality Date    LAMINOPLASTY CERVICAL POSTERIOR THREE+ LEVELS N/A 9/27/2023    Procedure: Cervical 2 and 7 Dome Laminectomies and C3-6 Laminoplasty with [employer] Implants;  Surgeon: Anirudh Sullivan MD;  Location: [facility] OR            Social History:     Social History     Tobacco Use    Smoking status: Every Day     Packs/day: 1.00     Years: 37.00     Additional pack years: 0.00     Total pack years: 37.00     Types: Cigarettes    Smokeless tobacco: Never    Tobacco comments:     20 cigarettes daily    Substance Use Topics    Alcohol use: No            Family History:     Family History   Problem Relation Age of Onset    Hypertension Mother     Peripheral Vascular Disease Mother     Restless Leg Syndrome Mother     Alzheimer Disease Father     Hypertension Sister     Diabetes No family hx of      Glaucoma No family hx of     Macular Degeneration No family hx of     Anesthesia Reaction No family hx of     Thrombosis No family hx of             Allergies:   No Known Allergies         Medications:     Current Outpatient Medications   Medication    acetaminophen (TYLENOL) 325 MG tablet    ALPRAZolam (XANAX) 1 MG tablet    aspirin (ASA) 81 MG chewable tablet    atorvastatin (LIPITOR) 20 MG tablet    buPROPion (WELLBUTRIN XL) 150 MG 24 hr tablet    carvedilol (COREG) 12.5 MG tablet    methadone (DOLOPHINE-INTENSOL) 10 MG/ML (HIGH CONC) solution    nicotine polacrilex (NICORETTE) 4 MG gum    NIFEdipine ER OSMOTIC (ADALAT CC) 30 MG 24 hr tablet    OLANZapine (ZYPREXA) 10 MG tablet    OLANZapine (ZYPREXA) 20 MG tablet    oxyCODONE (ROXICODONE) 5 MG tablet    polyethylene glycol (MIRALAX) 17 g packet    senna-docusate (SENOKOT-S/PERICOLACE) 8.6-50 MG tablet    vitamin D3 (CHOLECALCIFEROL) 50 mcg (2000 units) tablet     No current facility-administered medications for this visit.     Facility-Administered Medications Ordered in Other Visits   Medication    albuterol (PROVENTIL HFA/VENTOLIN HFA) inhaler    aminophylline  mg    regadenoson (LEXISCAN) injection 0.4 mg    sodium chloride (PF) 0.9% PF flush 10 mL    sodium chloride (PF) 0.9% PF flush 5-10 mL    sodium chloride bacteriostatic 0.9 % flush 0-1 mL             Review of Systems:   A focused musculoskeletal and neurologic ROS was performed with pertinent positives and negatives noted in the HPI.  Additional systems were also reviewed and are documented at the bottom of the note.         Physical Exam:   Vitals: There were no vitals taken for this visit.  Musculoskeletal, Neurologic, and Spine:     Cervical spine:    Appearance -no gross step-offs, kyphosis.    Motor -     C5: Deltoids R 5/5 and L 5/5 strength    C6: Biceps R 5/5 and L 5/5 strength     C7: Triceps R 5/5 and L 5/5 strength     C8:  R 5/5 and L 5/5 strength     T1: Dorsal interossei R 4/5  and L 4/5 strength        Sensation: intact to light touch in C5-T1  Icnision is healing well         Imaging:   We ordered and independently reviewed new radiographs at this clinic visit. The results were discussed with the patient. Findings include:            Assessment and Plan:     58 year old male with some return of neuropathic symptoms in the hands.  His postoperative x-rays show some new kyphosis.    I emphasized cervical extension exercises as well as periscapular stabilizing and strengthening for him to try to help him with his cervical kyphosis.  I did offer to get a cervical MRI given his continued neurologic symptoms, but the patient declined because he is severely claustrophobic.  Given that he does not has gross deficits I think we can monitor these neuropathic symptoms and I recommended he try gabapentin.  I would like to follow-up with him in 6 weeks to see if this is helping also to monitor his cervical kyphosis and I demonstrated and emphasized the cervical extension exercises here in clinic           Respectfully,  Anirudh Sullivan MD  Spine Surgery  [facility]

## 2023-11-07 NOTE — PROGRESS NOTES
Spine Surgery Return Clinic Visit      Chief Complaint:   RECHECK (Patient returns 6w s/p C2 and 7 dome laminectomies and C3-6 laminoplasty.  He states 1-2 weeks ago he felt crepitus in neck and a return of hand numbness.)      Interval HPI:  Symptom Profile Including: location of symptoms, onset, severity, exacerbating/alleviating factors, previous treatments:        Salas Macias is a 58 year old male who returns 6 weeks status post cervical laminoplasty.  He says his hands feel about the same as they did before surgery with some tingling in the hands.  His neck pain has been improving.            Past Medical History:     Past Medical History:   Diagnosis Date    Anxiety     Cervical myelopathy     Depression     HLD (hyperlipidemia)     Paranoid schizophrenia (H)             Past Surgical History:     Past Surgical History:   Procedure Laterality Date    LAMINOPLASTY CERVICAL POSTERIOR THREE+ LEVELS N/A 9/27/2023    Procedure: Cervical 2 and 7 Dome Laminectomies and C3-6 Laminoplasty with [employer] Implants;  Surgeon: Anirudh Sullivan MD;  Location:  OR            Social History:     Social History     Tobacco Use    Smoking status: Every Day     Packs/day: 1.00     Years: 37.00     Additional pack years: 0.00     Total pack years: 37.00     Types: Cigarettes    Smokeless tobacco: Never    Tobacco comments:     20 cigarettes daily    Substance Use Topics    Alcohol use: No            Family History:     Family History   Problem Relation Age of Onset    Hypertension Mother     Peripheral Vascular Disease Mother     Restless Leg Syndrome Mother     Alzheimer Disease Father     Hypertension Sister     Diabetes No family hx of     Glaucoma No family hx of     Macular Degeneration No family hx of     Anesthesia Reaction No family hx of     Thrombosis No family hx of             Allergies:   No Known Allergies         Medications:     Current Outpatient Medications   Medication    acetaminophen  (TYLENOL) 325 MG tablet    ALPRAZolam (XANAX) 1 MG tablet    aspirin (ASA) 81 MG chewable tablet    atorvastatin (LIPITOR) 20 MG tablet    buPROPion (WELLBUTRIN XL) 150 MG 24 hr tablet    carvedilol (COREG) 12.5 MG tablet    methadone (DOLOPHINE-INTENSOL) 10 MG/ML (HIGH CONC) solution    nicotine polacrilex (NICORETTE) 4 MG gum    NIFEdipine ER OSMOTIC (ADALAT CC) 30 MG 24 hr tablet    OLANZapine (ZYPREXA) 10 MG tablet    OLANZapine (ZYPREXA) 20 MG tablet    oxyCODONE (ROXICODONE) 5 MG tablet    polyethylene glycol (MIRALAX) 17 g packet    senna-docusate (SENOKOT-S/PERICOLACE) 8.6-50 MG tablet    vitamin D3 (CHOLECALCIFEROL) 50 mcg (2000 units) tablet     No current facility-administered medications for this visit.     Facility-Administered Medications Ordered in Other Visits   Medication    albuterol (PROVENTIL HFA/VENTOLIN HFA) inhaler    aminophylline  mg    regadenoson (LEXISCAN) injection 0.4 mg    sodium chloride (PF) 0.9% PF flush 10 mL    sodium chloride (PF) 0.9% PF flush 5-10 mL    sodium chloride bacteriostatic 0.9 % flush 0-1 mL             Review of Systems:   A focused musculoskeletal and neurologic ROS was performed with pertinent positives and negatives noted in the HPI.  Additional systems were also reviewed and are documented at the bottom of the note.         Physical Exam:   Vitals: There were no vitals taken for this visit.  Musculoskeletal, Neurologic, and Spine:     Cervical spine:    Appearance -no gross step-offs, kyphosis.    Motor -     C5: Deltoids R 5/5 and L 5/5 strength    C6: Biceps R 5/5 and L 5/5 strength     C7: Triceps R 5/5 and L 5/5 strength     C8:  R 5/5 and L 5/5 strength     T1: Dorsal interossei R 4/5 and L 4/5 strength        Sensation: intact to light touch in C5-T1  Icnision is healing well         Imaging:   We ordered and independently reviewed new radiographs at this clinic visit. The results were discussed with the patient. Findings include:             Assessment and Plan:     58 year old male with some return of neuropathic symptoms in the hands.  His postoperative x-rays show some new kyphosis.    I emphasized cervical extension exercises as well as periscapular stabilizing and strengthening for him to try to help him with his cervical kyphosis.  I did offer to get a cervical MRI given his continued neurologic symptoms, but the patient declined because he is severely claustrophobic.  Given that he does not has gross deficits I think we can monitor these neuropathic symptoms and I recommended he try gabapentin.  I would like to follow-up with him in 6 weeks to see if this is helping also to monitor his cervical kyphosis and I demonstrated and emphasized the cervical extension exercises here in clinic           Respectfully,  Anirudh Sullivan MD  Spine Surgery  [facility]

## 2023-11-07 NOTE — NURSING NOTE
Reason For Visit:   Chief Complaint   Patient presents with    RECHECK     Patient returns 6w s/p C2 and 7 dome laminectomies and C3-6 laminoplasty.  He states 1-2 weeks ago he felt crepitus in neck and a return of hand numbness.       Primary MD: Abram Bagley  Ref. MD: michael    Date of surgery: 9/29/23  Type of surgery: C2 & 7 dome laminectomies and C3-6 laminoplasties.      There were no vitals taken for this visit.    Pain Assessment  Patient Currently in Pain: Yes  0-10 Pain Scale: 2  Primary Pain Location: Neck    Oswestry (JANY) Questionnaire         No data to display                     Neck Disability Index (NDI) Questionnaire         No data to display                       Visual Analog Pain Scale  Neck Pain Scale 0-10: 2  Right arm pain: 0 (no pain, endorses mild tingling)  Left arm pain: 0 (no pain, endorses mild tingling)    Promis 10 Assessment         No data to display                         Filipe Dietrich, ATC

## 2023-11-15 ENCOUNTER — THERAPY VISIT (OUTPATIENT)
Dept: PHYSICAL THERAPY | Facility: CLINIC | Age: 58
End: 2023-11-15
Attending: ORTHOPAEDIC SURGERY
Payer: COMMERCIAL

## 2023-11-15 DIAGNOSIS — Z98.890 H/O CERVICAL SPINE SURGERY: ICD-10-CM

## 2023-11-15 DIAGNOSIS — G95.9 CERVICAL MYELOPATHY (H): Primary | ICD-10-CM

## 2023-11-15 PROCEDURE — 97110 THERAPEUTIC EXERCISES: CPT | Mod: GP | Performed by: PHYSICAL THERAPIST

## 2023-11-15 PROCEDURE — 97161 PT EVAL LOW COMPLEX 20 MIN: CPT | Mod: GP | Performed by: PHYSICAL THERAPIST

## 2023-11-15 PROCEDURE — 97112 NEUROMUSCULAR REEDUCATION: CPT | Mod: GP | Performed by: PHYSICAL THERAPIST

## 2023-11-15 NOTE — PROGRESS NOTES
PHYSICAL THERAPY EVALUATION  Type of Visit: Evaluation    See electronic medical record for Abuse and Falls Screening details.    Subjective       Presenting condition or subjective complaint:    Patient presents 6w s/p C2 and 7 dome laminectomies and C3-6 laminoplasty.  He has felt crepitus in neck and a return of B hand numbness. He notes his neck pain is decreasing. Additionally, he is able to walk and is not experiencing the myelopathy in his R leg.  Pt has a follow up visit with Dr. Sullivan on 12/19/23.  Date of onset: 09/27/23    Relevant medical history:     Dates & types of surgery:      Prior diagnostic imaging/testing results:     x ray; pt has denied MRI because he is severly claustrophobic despite neurological symptoms present  Prior therapy history for the same diagnosis, illness or injury:    no    Prior Level of Function: independent  Living Environment: reports he is currently sleeping on an air mattress which is impacting his neck symptoms  Employment:    none reported  Hobbies/Interests:      Patient goals for therapy:  decrease tingling in hands and improve posture  Pain assessment: Pain present; mild discomfort in neck; more bothered by B tingling in hands     Objective   CERVICAL SPINE EVALUATION  INTEGUMENTARY (edema, incisions):  closed, healing, not sensitivie to light touch  POSTURE:  forward rounded shoulders, forward head position, decreased cercvial extension and scapular stability  ROM: cervical rotation AROM R: 55 deg L: 62 deg; side bending limited by mild pain; cervical flexion normal, cervical extension limited  MYOTOMES: WNL for UE  SENSATION: notes B UE tingling primarily in hands  NEURAL TENSION:  cervical not formally assessed today  FLEXIBILITY:  decreased mobility and flexibility of UT muscles    PALPATION: WFL; no notable tenderness  SPINAL SEGMENTAL CONCLUSIONS: not formally evaluated today due to surgical status  STRENGTH B UE WNL, dec strength of lower trap and periscapular  muscles as demonstrated by impaired posture  : R 105# L 108# (average of 3 trials)    Assessment & Plan   CLINICAL IMPRESSIONS  Medical Diagnosis: H/O cervical spine surgery  Cervical myelopathy    Treatment Diagnosis: Pain in Neck with mobility deficits and radiating symptoms   Impression/Assessment: Patient is a 58 year old male with decreased scapular strength, posture, pain, and head positioning complaints.  The following significant findings have been identified: Pain, Decreased ROM/flexibility, Decreased joint mobility, Decreased strength, and Impaired posture. These impairments interfere with their ability to perform self care tasks, recreational activities, household chores, driving , household mobility, and community mobility as compared to previous level of function.     Clinical Decision Making (Complexity):  Clinical Presentation: Stable/Uncomplicated  Clinical Presentation Rationale: based on medical and personal factors listed in PT evaluation  Clinical Decision Making (Complexity): Low complexity    PLAN OF CARE  Treatment Interventions:  Interventions: Gait Training, Manual Therapy, Neuromuscular Re-education, Therapeutic Activity, Therapeutic Exercise    Long Term Goals     PT Goal 1  Goal Identifier: HEP  Goal Description: Patient will be consistent and independent with HEP in order to achieve functional goals.  Rationale: to maximize safety and independence with performance of ADLs and functional tasks;to maximize safety and independence within the home;to maximize safety and independence within the community;to maximize safety and independence with self cares  Target Date: 01/10/24  PT Goal 2  Goal Identifier: Posture  Goal Description: Patient will demonstrability to self correct posture in order to decrease symptoms and avoid further aggravation of symnptoms.  Rationale: to maximize safety and independence with self cares;to maximize safety and independence with performance of ADLs and  functional tasks  Target Date: 01/10/24  PT Goal 3  Goal Identifier: Strength  Goal Description: Patient will demonstrate scapular strength and stability WFL in order to maintain improved posture throughout the day.  Rationale: to maximize safety and independence with self cares;to maximize safety and independence with performance of ADLs and functional tasks;to maximize safety and independence with transportation  Target Date: 01/10/24      Frequency of Treatment: 1x  Duration of Treatment: 8 weeks    Recommended Referrals to Other Professionals:   Education Assessment:   Learner/Method: Patient;No Barriers to Learning  Education Comments: no concerns verbalized, all questions answered    Risks and benefits of evaluation/treatment have been explained.   Patient/Family/caregiver agrees with Plan of Care.     Evaluation Time:        Signing Clinician: Radha Steele, PT      [facility]                                                                                   OUTPATIENT PHYSICAL THERAPY      PLAN OF TREATMENT FOR OUTPATIENT REHABILITATION   Patient's Last Name, First Name, Salas Pierre YOB: 1965   Provider's Name   [facility]   Medical Record No.  [id number]     Onset Date: 09/27/23  Start of Care Date: 11/15/23     Medical Diagnosis:  H/O cervical spine surgery  Cervical myelopathy      PT Treatment Diagnosis:  Pain in Neck with mobility deficits and radiating symptoms Plan of Treatment  Frequency/Duration: 1x/ 8 weeks    Certification date from 11/15/23 to 01/10/24         See note for plan of treatment details and functional goals     Radha Steele, PT                         I CERTIFY THE NEED FOR THESE SERVICES FURNISHED UNDER        THIS PLAN OF TREATMENT AND WHILE UNDER MY CARE     (Physician attestation of this document indicates review and certification of the therapy plan).              Referring  Provider:  Anirudh Sullivan    Initial Assessment  See Epic Evaluation- Start of Care Date: 11/15/23

## 2023-12-14 DIAGNOSIS — Z98.890 H/O CERVICAL SPINE SURGERY: Primary | ICD-10-CM

## 2023-12-19 ENCOUNTER — ANCILLARY PROCEDURE (OUTPATIENT)
Dept: GENERAL RADIOLOGY | Facility: CLINIC | Age: 58
End: 2023-12-19
Attending: ORTHOPAEDIC SURGERY
Payer: COMMERCIAL

## 2023-12-19 ENCOUNTER — OFFICE VISIT (OUTPATIENT)
Dept: ORTHOPEDICS | Facility: CLINIC | Age: 58
End: 2023-12-19
Payer: COMMERCIAL

## 2023-12-19 DIAGNOSIS — Z98.890 H/O CERVICAL SPINE SURGERY: ICD-10-CM

## 2023-12-19 DIAGNOSIS — G95.9 CERVICAL MYELOPATHY (H): ICD-10-CM

## 2023-12-19 PROCEDURE — 99024 POSTOP FOLLOW-UP VISIT: CPT | Performed by: ORTHOPAEDIC SURGERY

## 2023-12-19 PROCEDURE — 72050 X-RAY EXAM NECK SPINE 4/5VWS: CPT | Mod: GC | Performed by: RADIOLOGY

## 2023-12-19 RX ORDER — GABAPENTIN 300 MG/1
300 CAPSULE ORAL 3 TIMES DAILY
Qty: 90 CAPSULE | Refills: 1 | Status: SHIPPED | OUTPATIENT
Start: 2023-12-19

## 2023-12-19 NOTE — NURSING NOTE
Reason For Visit:   Chief Complaint   Patient presents with    RECHECK     -6 wk f/u -C2 & 7 dome laminectomies/C3-6 laminoplasty // DOS 9/27/23  -requesting gabapentin refill       Primary MD: Abram Bagley  Ref. MD: est      Date of surgery: laminectomies and laminoplasty   Type of surgery: 9/27/23.        There were no vitals taken for this visit.    Pain Assessment  Patient Currently in Pain: Yes  0-10 Pain Scale: 3  Primary Pain Location: Neck                Miles Chelsey, ATC

## 2023-12-19 NOTE — PROGRESS NOTES
Spine Surgery Return Clinic Visit      Chief Complaint:   RECHECK (-6 wk f/u -C2 & 7 dome laminectomies/C3-6 laminoplasty // DOS 9/27/23/-requesting gabapentin refill)      Interval HPI:  Symptom Profile Including: location of symptoms, onset, severity, exacerbating/alleviating factors, previous treatments:        Salas Macias is a 58 year old male who returns today about 3 months status post laminoplasty.  At her last visit he had a little bit of cervical kyphosis.  Has been working on neck extension exercises.  Says the neck and arms are feeling great today.  He would like a refill of his gabapentin.            Past Medical History:     Past Medical History:   Diagnosis Date    Anxiety     Cervical myelopathy     Depression     HLD (hyperlipidemia)     Paranoid schizophrenia (H)             Past Surgical History:     Past Surgical History:   Procedure Laterality Date    LAMINOPLASTY CERVICAL POSTERIOR THREE+ LEVELS N/A 9/27/2023    Procedure: Cervical 2 and 7 Dome Laminectomies and C3-6 Laminoplasty with [employer] Implants;  Surgeon: Anirudh Sullivan MD;  Location:  OR            Social History:     Social History     Tobacco Use    Smoking status: Every Day     Packs/day: 1.00     Years: 37.00     Additional pack years: 0.00     Total pack years: 37.00     Types: Cigarettes    Smokeless tobacco: Never    Tobacco comments:     20 cigarettes daily    Substance Use Topics    Alcohol use: No            Family History:     Family History   Problem Relation Age of Onset    Hypertension Mother     Peripheral Vascular Disease Mother     Restless Leg Syndrome Mother     Alzheimer Disease Father     Hypertension Sister     Diabetes No family hx of     Glaucoma No family hx of     Macular Degeneration No family hx of     Anesthesia Reaction No family hx of     Thrombosis No family hx of             Allergies:   No Known Allergies         Medications:     Current Outpatient Medications   Medication     gabapentin (NEURONTIN) 300 MG capsule    acetaminophen (TYLENOL) 325 MG tablet    ALPRAZolam (XANAX) 1 MG tablet    aspirin (ASA) 81 MG chewable tablet    atorvastatin (LIPITOR) 20 MG tablet    buPROPion (WELLBUTRIN XL) 150 MG 24 hr tablet    carvedilol (COREG) 12.5 MG tablet    methadone (DOLOPHINE-INTENSOL) 10 MG/ML (HIGH CONC) solution    nicotine polacrilex (NICORETTE) 4 MG gum    NIFEdipine ER OSMOTIC (ADALAT CC) 30 MG 24 hr tablet    OLANZapine (ZYPREXA) 10 MG tablet    OLANZapine (ZYPREXA) 20 MG tablet    oxyCODONE (ROXICODONE) 5 MG tablet    polyethylene glycol (MIRALAX) 17 g packet    senna-docusate (SENOKOT-S/PERICOLACE) 8.6-50 MG tablet    vitamin D3 (CHOLECALCIFEROL) 50 mcg (2000 units) tablet     No current facility-administered medications for this visit.     Facility-Administered Medications Ordered in Other Visits   Medication    albuterol (PROVENTIL HFA/VENTOLIN HFA) inhaler    aminophylline  mg    regadenoson (LEXISCAN) injection 0.4 mg    sodium chloride (PF) 0.9% PF flush 10 mL    sodium chloride (PF) 0.9% PF flush 5-10 mL    sodium chloride bacteriostatic 0.9 % flush 0-1 mL             Review of Systems:   A focused musculoskeletal and neurologic ROS was performed with pertinent positives and negatives noted in the HPI.  Additional systems were also reviewed and are documented at the bottom of the note.         Physical Exam:   Vitals: There were no vitals taken for this visit.  Musculoskeletal, Neurologic, and Spine:     Cervical spine:    Appearance -no gross step-offs, kyphosis.    Motor -     C5: Deltoids R 5/5 and L 5/5 strength    C6: Biceps R 5/5 and L 5/5 strength     C7: Triceps R 5/5 and L 5/5 strength     C8:  R 5/5 and L 5/5 strength           Sensation: intact to light touch in C5-T1              Imaging:   We ordered and independently reviewed new radiographs at this clinic visit. The results were discussed with the patient. Findings include:     Radiographs today were  compared against the films from 6 weeks ago and has been a significant improvement in cervical alignment       Assessment and Plan:     58 year old male with significant improvement in alignment after neck extension exercises I encouraged him to keep these up and I am very encouraged by his improvement.  I am glad he is doing well today.  Follow-up with x-rays in 6 months.  We will refill his gabapentin for him.  Further refills can go through primary care doctor.           Respectfully,  Anirudh Sullivan MD  Spine Surgery  [facility]

## 2023-12-19 NOTE — LETTER
12/19/2023         RE: Salas Macias  [address] [address]        Dear Colleague,    Thank you for referring your patient, Salas Macias, to the [facility] [place]. Please see a copy of my visit note below.    Spine Surgery Return Clinic Visit      Chief Complaint:   RECHECK (-6 wk f/u -C2 & 7 dome laminectomies/C3-6 laminoplasty // DOS 9/27/23/-requesting gabapentin refill)      Interval HPI:  Symptom Profile Including: location of symptoms, onset, severity, exacerbating/alleviating factors, previous treatments:        Salas Macias is a 58 year old male who returns today about 3 months status post laminoplasty.  At her last visit he had a little bit of cervical kyphosis.  Has been working on neck extension exercises.  Says the neck and arms are feeling great today.  He would like a refill of his gabapentin.            Past Medical History:     Past Medical History:   Diagnosis Date    Anxiety     Cervical myelopathy     Depression     HLD (hyperlipidemia)     Paranoid schizophrenia (H)             Past Surgical History:     Past Surgical History:   Procedure Laterality Date    LAMINOPLASTY CERVICAL POSTERIOR THREE+ LEVELS N/A 9/27/2023    Procedure: Cervical 2 and 7 Dome Laminectomies and C3-6 Laminoplasty with [employer] Implants;  Surgeon: Anirudh Sullivan MD;  Location:  OR            Social History:     Social History     Tobacco Use    Smoking status: Every Day     Packs/day: 1.00     Years: 37.00     Additional pack years: 0.00     Total pack years: 37.00     Types: Cigarettes    Smokeless tobacco: Never    Tobacco comments:     20 cigarettes daily    Substance Use Topics    Alcohol use: No            Family History:     Family History   Problem Relation Age of Onset    Hypertension Mother     Peripheral Vascular Disease Mother     Restless Leg Syndrome Mother     Alzheimer Disease Father     Hypertension Sister     Diabetes No  family hx of     Glaucoma No family hx of     Macular Degeneration No family hx of     Anesthesia Reaction No family hx of     Thrombosis No family hx of             Allergies:   No Known Allergies         Medications:     Current Outpatient Medications   Medication    gabapentin (NEURONTIN) 300 MG capsule    acetaminophen (TYLENOL) 325 MG tablet    ALPRAZolam (XANAX) 1 MG tablet    aspirin (ASA) 81 MG chewable tablet    atorvastatin (LIPITOR) 20 MG tablet    buPROPion (WELLBUTRIN XL) 150 MG 24 hr tablet    carvedilol (COREG) 12.5 MG tablet    methadone (DOLOPHINE-INTENSOL) 10 MG/ML (HIGH CONC) solution    nicotine polacrilex (NICORETTE) 4 MG gum    NIFEdipine ER OSMOTIC (ADALAT CC) 30 MG 24 hr tablet    OLANZapine (ZYPREXA) 10 MG tablet    OLANZapine (ZYPREXA) 20 MG tablet    oxyCODONE (ROXICODONE) 5 MG tablet    polyethylene glycol (MIRALAX) 17 g packet    senna-docusate (SENOKOT-S/PERICOLACE) 8.6-50 MG tablet    vitamin D3 (CHOLECALCIFEROL) 50 mcg (2000 units) tablet     No current facility-administered medications for this visit.     Facility-Administered Medications Ordered in Other Visits   Medication    albuterol (PROVENTIL HFA/VENTOLIN HFA) inhaler    aminophylline  mg    regadenoson (LEXISCAN) injection 0.4 mg    sodium chloride (PF) 0.9% PF flush 10 mL    sodium chloride (PF) 0.9% PF flush 5-10 mL    sodium chloride bacteriostatic 0.9 % flush 0-1 mL             Review of Systems:   A focused musculoskeletal and neurologic ROS was performed with pertinent positives and negatives noted in the HPI.  Additional systems were also reviewed and are documented at the bottom of the note.         Physical Exam:   Vitals: There were no vitals taken for this visit.  Musculoskeletal, Neurologic, and Spine:     Cervical spine:    Appearance -no gross step-offs, kyphosis.    Motor -     C5: Deltoids R 5/5 and L 5/5 strength    C6: Biceps R 5/5 and L 5/5 strength     C7: Triceps R 5/5 and L 5/5 strength     C8:   R 5/5 and L 5/5 strength           Sensation: intact to light touch in C5-T1              Imaging:   We ordered and independently reviewed new radiographs at this clinic visit. The results were discussed with the patient. Findings include:     Radiographs today were compared against the films from 6 weeks ago and has been a significant improvement in cervical alignment       Assessment and Plan:     58 year old male with significant improvement in alignment after neck extension exercises I encouraged him to keep these up and I am very encouraged by his improvement.  I am glad he is doing well today.  Follow-up with x-rays in 6 months.  We will refill his gabapentin for him.  Further refills can go through primary care doctor.           Respectfully,  Anirudh Sullivan MD  Spine Surgery  [facility]

## 2023-12-22 NOTE — PROGRESS NOTES
DISCHARGE  Reason for Discharge:   Patient has not made expected progress due to interrupted treatment attendance. Patient chooses to discontinue therapy.     11/15/23 0500   PT Goal 1   Goal Identifier HEP   Goal Description Patient will be consistent and independent with HEP in order to achieve functional goals.   Rationale to maximize safety and independence with performance of ADLs and functional tasks;to maximize safety and independence within the home;to maximize safety and independence within the community;to maximize safety and independence with self cares   Target Date 01/10/24   PT Goal 2   Goal Identifier Posture   Goal Description Patient will demonstrability to self correct posture in order to decrease symptoms and avoid further aggravation of symnptoms.   Rationale to maximize safety and independence with self cares;to maximize safety and independence with performance of ADLs and functional tasks   Target Date 01/10/24   PT Goal 3   Goal Identifier Strength   Goal Description Patient will demonstrate scapular strength and stability WFL in order to maintain improved posture throughout the day.   Rationale to maximize safety and independence with self cares;to maximize safety and independence with performance of ADLs and functional tasks;to maximize safety and independence with transportation   Target Date 01/10/24     Discharge Plan: Patient to continue home program independently.    Referring Provider:  Anirudh Mackenzie

## 2024-01-11 NOTE — CONFIDENTIAL NOTE
HPI:    Last visit with us 8/17/2022. He states overall he is doing well. He declines all vaccines. He declines getting a colonoscopy. He declines smoking cessation (he has seen NHUNG Cabrera in the past). He follows with outside psychiatry and this is stable and he remains on his same medications. He has some B hand numbness and is on 300 mg TID of gabapentin and feels this is mostly beneficial. No other HEENT, cardiopulmonary, abdominal, , neurological, systemic, psychiatric, lymphatic, endocrine, vascular complaints.       Past Medical History:   Diagnosis Date    Anxiety     Cervical myelopathy     Depression     HLD (hyperlipidemia)     Paranoid schizophrenia (H)      Past Surgical History:   Procedure Laterality Date    LAMINOPLASTY CERVICAL POSTERIOR THREE+ LEVELS N/A 9/27/2023    Procedure: Cervical 2 and 7 Dome Laminectomies and C3-6 Laminoplasty with [employer] Implants;  Surgeon: Anirudh Sullivan MD;  Location: [facility] OR     PE:    Vitals noted, gen, nad, cooperative, alert, neck supple nl, rom, lungs with good air movement, RRR, S1, S2, no MRG, abdomen, no acute findings, Grossly normal neurological exam.     NM Lexiscan 9/26/2022      The nuclear stress test is negative for inducible myocardial ischemia or infarction. Left ventricular function is normal.    There is no prior study for comparison.    CTA neck 11/3/2022:  EXAM: CTA HEAD NECK W CONTRAST  11/3/2022 2:16 PM      HISTORY:  Stroke/TIA, assess intracranial arteries; Homonymous  hemianopia, left; Occipital infarction (H)        COMPARISON:  MR brain 1/5/2022, CT head 9/26/2021     TECHNIQUE:     HEAD and NECK CTA: During rapid bolus intravenous injection of  nonionic contrast material, axial images were obtained using thin  collimation multidetector helical technique from the base of the upper  aortic arch through the [place]. This CT angiogram data was  reconstructed at thin intervals with mild overlap. Images were sent  to  the 3D workstation, and 3D reconstructions were obtained. The axial  source images, multiplanar reformations, 3D reconstructions in both  maximum intensity projection display and volume rendered models were  reviewed, with reconstructions performed by the technologist.     CONTRAST: Isovue 370 75cc     FINDINGS:  Head CTA demonstrates no intracranial arterial aneurysm or stenosis.  No large vessel occlusion. Hypoplastic appearance of the left A1  segment. Previously identified right parieto-occpital infarction on  MR, 1/5/2022 is not well appreciated on this examination.     Neck CTA demonstrates conventional aortic arch branching pattern and  patent great vessel origins. No internal carotid artery stenosis. No  vertebral artery stenosis. Scattered atherosclerotic vascular  calcifications of the internal carotid artery immediately distal to  the carotid bifurcation on the     No acute finding in the visualized neck soft tissues or in the  superior mediastinum/thorax. Scattered layering mucus debris within  the sphenoid sinuses. Mucous retention cyst within the right maxillary  sinus.                                                                      IMPRESSION:    1. Head CTA demonstrates no aneurysm or stenosis of the major  intracranial arteries. No large vessel occlusion.  2. Neck CTA demonstrates no stenosis of the major cervical arteries.    Resting echo 4/4/2022:  Procedure  Echocardiogram with two-dimensional, color and spectral Doppler performed.  Good quality two-dimensional was performed and interpreted.  ______________________________________________________________________________  Interpretation Summary  Global and regional left ventricular function is normal with an EF of 55-60%.  Right ventricular function, chamber size, wall motion, and thickness are  normal.  No significant valvular abnormalities were noted.  Previous study not available for  comparison.  ______________________________________________________________________________  Left Ventricle  Left ventricular size is normal. Left ventricular wall thickness is normal.  Global and regional left ventricular function is normal with an EF of 55-60%.  Left ventricular diastolic function is normal. Diastolic Doppler findings  (E/E' ratio and/or other parameters) suggest left ventricular filling  pressures are normal.     Right Ventricle  Right ventricular function, chamber size, wall motion, and thickness are  normal.     Atria  Both atria appear normal.     Mitral Valve  The mitral valve is normal.     Aortic Valve  Aortic valve is normal in structure and function. The aortic valve is  tricuspid.     Tricuspid Valve  The tricuspid valve is normal. Trace tricuspid insufficiency is present. The  peak velocity of the tricuspid regurgitant jet is not obtainable. Pulmonary  artery systolic pressure cannot be assessed.     Pulmonic Valve  The pulmonic valve is normal. Trace pulmonic insufficiency is present.     Vessels  The aorta root is normal. The pulmonary artery cannot be assessed. The  inferior vena cava was normal in size with preserved respiratory variability.  IVC diameter <2.1 cm collapsing >50% with sniff suggests a normal RA pressure  of 3 mmHg.     Pericardium  No pericardial effusion is present.     Compared to Previous Study  Previous study not available for comparison.    Results for orders placed or performed in visit on 01/12/24   PSA, screen     Status: Normal   Result Value Ref Range    Prostate Specific Antigen Screen 1.11 0.00 - 3.50 ng/mL    Narrative    This result is obtained using the Roche Elecsys total PSA method on the leigh e411 immunoassay analyzer. Results obtained with different assay methods or kits cannot be used interchangeably.   Lipid panel reflex to direct LDL Fasting     Status: Abnormal   Result Value Ref Range    Cholesterol 181 <200 mg/dL    Triglycerides 49 <150  mg/dL    Direct Measure HDL 60 >=40 mg/dL    LDL Cholesterol Calculated 111 (H) <=100 mg/dL    Non HDL Cholesterol 121 <130 mg/dL    Patient Fasting > 8hrs? Yes     Narrative    Cholesterol  Desirable:  <200 mg/dL    Triglycerides  Normal:  Less than 150 mg/dL  Borderline High:  150-199 mg/dL  High:  200-499 mg/dL  Very High:  Greater than or equal to 500 mg/dL    Direct Measure HDL  Female:  Greater than or equal to 50 mg/dL   Male:  Greater than or equal to 40 mg/dL    LDL Cholesterol  Desirable:  <100mg/dL  Above Desirable:  100-129 mg/dL   Borderline High:  130-159 mg/dL   High:  160-189 mg/dL   Very High:  >= 190 mg/dL    Non HDL Cholesterol  Desirable:  130 mg/dL  Above Desirable:  130-159 mg/dL  Borderline High:  160-189 mg/dL  High:  190-219 mg/dL  Very High:  Greater than or equal to 220 mg/dL   Comprehensive metabolic panel     Status: Abnormal   Result Value Ref Range    Sodium 141 135 - 145 mmol/L    Potassium 4.6 3.4 - 5.3 mmol/L    Carbon Dioxide (CO2) 31 (H) 22 - 29 mmol/L    Anion Gap 8 7 - 15 mmol/L    Urea Nitrogen 10.6 6.0 - 20.0 mg/dL    Creatinine 0.77 0.67 - 1.17 mg/dL    GFR Estimate >90 >60 mL/min/1.73m2    Calcium 9.6 8.6 - 10.0 mg/dL    Chloride 102 98 - 107 mmol/L    Glucose 109 (H) 70 - 99 mg/dL    Alkaline Phosphatase 85 40 - 150 U/L    AST 20 0 - 45 U/L    ALT 15 0 - 70 U/L    Protein Total 7.5 6.4 - 8.3 g/dL    Albumin 4.5 3.5 - 5.2 g/dL    Bilirubin Total 0.2 <=1.2 mg/dL   Hemoglobin A1c     Status: Abnormal   Result Value Ref Range    Hemoglobin A1C 6.4 (H) <5.7 %   CBC with platelets and differential     Status: None   Result Value Ref Range    WBC Count 7.1 4.0 - 11.0 10e3/uL    RBC Count 5.06 4.40 - 5.90 10e6/uL    Hemoglobin 14.6 13.3 - 17.7 g/dL    Hematocrit 45.3 40.0 - 53.0 %    MCV 90 78 - 100 fL    MCH 28.9 26.5 - 33.0 pg    MCHC 32.2 31.5 - 36.5 g/dL    RDW 15.0 10.0 - 15.0 %    Platelet Count 181 150 - 450 10e3/uL    % Neutrophils 60 %    % Lymphocytes 28 %    % Monocytes  10 %    % Eosinophils 1 %    % Basophils 1 %    % Immature Granulocytes 0 %    NRBCs per 100 WBC 0 <1 /100    Absolute Neutrophils 4.3 1.6 - 8.3 10e3/uL    Absolute Lymphocytes 2.0 0.8 - 5.3 10e3/uL    Absolute Monocytes 0.7 0.0 - 1.3 10e3/uL    Absolute Eosinophils 0.1 0.0 - 0.7 10e3/uL    Absolute Basophils 0.0 0.0 - 0.2 10e3/uL    Absolute Immature Granulocytes 0.0 <=0.4 10e3/uL    Absolute NRBCs 0.0 10e3/uL   CBC with platelets and differential     Status: None    Narrative    The following orders were created for panel order CBC with platelets and differential.  Procedure                               Abnormality         Status                     ---------                               -----------         ------                     CBC with platelets and d...[[id number]]                      Final result                 Please view results for these tests on the individual orders.         A/P:    1. Immunizations; Tdap 9/26/2021. 4/18/2022 Hep B antibody to surface antigen negative and could get Hep B vaccine series. He declines all other immunizations. He declines hepatitis B vaccine series. He declines COVID vaccines.   2. Increased lipids on Atorvastatin; 4/18/2022;  and HDL 52. Ordered labs 1/11/2024.   3. On nicotine replacement and Chantix for smoking. See MTM note Abram Campbell 3/11/2022. Reordered nicotine gum 8/17/2022. At this time he does not want to follow up with Abram Campbell or consider other smoking cessation strategies.   4. Psychiatry on Olanzapine and Fluoxetine. He states he follows at the [facility], Psychiatry  5. On vitamin D replacement; Vitamin D level 24 on 2/9/2022   6.  He was seen 1/19/2022, Neurology, Dr. Young. He ordered Echo with bubble (not done), CTA head and neck (not done). He was to follow up with Dr. Young in 3 months but he did not schedule.   7. TSH checked normal 4/18/2022  8. A1c 4/18/2022 6.1% Ordered A1c today  9. Low platelets 141 on 2/9/2022 and  ordered repeat labs. Ordered abdominal U/S 4/18/2022 and not done yet  10. PSA 1.18 on 2/9/2022. Ordered PSA on 1/11/2024.   11. Colonoscopy; he had negative FIT testing 11/18/2022 and declines all colonoscopy for colon cancer screening. Ordered Cologuard today 1/12/2024  12. HTN; ordered 24 hour BP monitor ordered 8/17/2022 but not done. Still recommend he declines. Recommend he check at home  13. See Dr. Sullivan's Orthopedic spine note 12/19/2023, s/p cervical spine surgery 9/27/2023. He has 6/11/2024 follow up with Dr. Sullivan. He remains on Gabapentin 300 mg TID.     30 minutes spent on the date of the encounter doing chart review, history and exam, documentation and further activities as noted above exclusive of procedures and other billable interpretations

## 2024-01-12 ENCOUNTER — LAB (OUTPATIENT)
Dept: LAB | Facility: CLINIC | Age: 59
End: 2024-01-12
Payer: COMMERCIAL

## 2024-01-12 ENCOUNTER — OFFICE VISIT (OUTPATIENT)
Dept: INTERNAL MEDICINE | Facility: CLINIC | Age: 59
End: 2024-01-12
Payer: COMMERCIAL

## 2024-01-12 ENCOUNTER — ORDERS ONLY (AUTO-RELEASED) (OUTPATIENT)
Dept: INTERNAL MEDICINE | Facility: CLINIC | Age: 59
End: 2024-01-12

## 2024-01-12 VITALS
SYSTOLIC BLOOD PRESSURE: 152 MMHG | BODY MASS INDEX: 24.81 KG/M2 | OXYGEN SATURATION: 95 % | WEIGHT: 187.2 LBS | HEART RATE: 90 BPM | TEMPERATURE: 98.4 F | DIASTOLIC BLOOD PRESSURE: 97 MMHG | HEIGHT: 73 IN

## 2024-01-12 DIAGNOSIS — I10 BENIGN ESSENTIAL HYPERTENSION: ICD-10-CM

## 2024-01-12 DIAGNOSIS — E55.9 VITAMIN D DEFICIENCY: ICD-10-CM

## 2024-01-12 DIAGNOSIS — E78.00 HIGH BLOOD CHOLESTEROL: Primary | ICD-10-CM

## 2024-01-12 DIAGNOSIS — R73.09 INCREASED GLUCOSE LEVEL: ICD-10-CM

## 2024-01-12 DIAGNOSIS — E78.00 HIGH BLOOD CHOLESTEROL: ICD-10-CM

## 2024-01-12 DIAGNOSIS — Z12.11 SPECIAL SCREENING FOR MALIGNANT NEOPLASMS, COLON: ICD-10-CM

## 2024-01-12 LAB
ALBUMIN SERPL BCG-MCNC: 4.5 G/DL (ref 3.5–5.2)
ALP SERPL-CCNC: 85 U/L (ref 40–150)
ALT SERPL W P-5'-P-CCNC: 15 U/L (ref 0–70)
ANION GAP SERPL CALCULATED.3IONS-SCNC: 8 MMOL/L (ref 7–15)
AST SERPL W P-5'-P-CCNC: 20 U/L (ref 0–45)
BASOPHILS # BLD AUTO: 0 10E3/UL (ref 0–0.2)
BASOPHILS NFR BLD AUTO: 1 %
BILIRUB SERPL-MCNC: 0.2 MG/DL
BUN SERPL-MCNC: 10.6 MG/DL (ref 6–20)
CALCIUM SERPL-MCNC: 9.6 MG/DL (ref 8.6–10)
CHLORIDE SERPL-SCNC: 102 MMOL/L (ref 98–107)
CHOLEST SERPL-MCNC: 181 MG/DL
CREAT SERPL-MCNC: 0.77 MG/DL (ref 0.67–1.17)
DEPRECATED HCO3 PLAS-SCNC: 31 MMOL/L (ref 22–29)
EGFRCR SERPLBLD CKD-EPI 2021: >90 ML/MIN/1.73M2
EOSINOPHIL # BLD AUTO: 0.1 10E3/UL (ref 0–0.7)
EOSINOPHIL NFR BLD AUTO: 1 %
ERYTHROCYTE [DISTWIDTH] IN BLOOD BY AUTOMATED COUNT: 15 % (ref 10–15)
FASTING STATUS PATIENT QL REPORTED: YES
GLUCOSE SERPL-MCNC: 109 MG/DL (ref 70–99)
HBA1C MFR BLD: 6.4 %
HCT VFR BLD AUTO: 45.3 % (ref 40–53)
HDLC SERPL-MCNC: 60 MG/DL
HGB BLD-MCNC: 14.6 G/DL (ref 13.3–17.7)
IMM GRANULOCYTES # BLD: 0 10E3/UL
IMM GRANULOCYTES NFR BLD: 0 %
LDLC SERPL CALC-MCNC: 111 MG/DL
LYMPHOCYTES # BLD AUTO: 2 10E3/UL (ref 0.8–5.3)
LYMPHOCYTES NFR BLD AUTO: 28 %
MCH RBC QN AUTO: 28.9 PG (ref 26.5–33)
MCHC RBC AUTO-ENTMCNC: 32.2 G/DL (ref 31.5–36.5)
MCV RBC AUTO: 90 FL (ref 78–100)
MONOCYTES # BLD AUTO: 0.7 10E3/UL (ref 0–1.3)
MONOCYTES NFR BLD AUTO: 10 %
NEUTROPHILS # BLD AUTO: 4.3 10E3/UL (ref 1.6–8.3)
NEUTROPHILS NFR BLD AUTO: 60 %
NONHDLC SERPL-MCNC: 121 MG/DL
NRBC # BLD AUTO: 0 10E3/UL
NRBC BLD AUTO-RTO: 0 /100
PLATELET # BLD AUTO: 181 10E3/UL (ref 150–450)
POTASSIUM SERPL-SCNC: 4.6 MMOL/L (ref 3.4–5.3)
PROT SERPL-MCNC: 7.5 G/DL (ref 6.4–8.3)
PSA SERPL DL<=0.01 NG/ML-MCNC: 1.11 NG/ML (ref 0–3.5)
RBC # BLD AUTO: 5.06 10E6/UL (ref 4.4–5.9)
SODIUM SERPL-SCNC: 141 MMOL/L (ref 135–145)
TRIGL SERPL-MCNC: 49 MG/DL
VIT D+METAB SERPL-MCNC: 9 NG/ML (ref 20–50)
WBC # BLD AUTO: 7.1 10E3/UL (ref 4–11)

## 2024-01-12 PROCEDURE — 83036 HEMOGLOBIN GLYCOSYLATED A1C: CPT | Performed by: INTERNAL MEDICINE

## 2024-01-12 PROCEDURE — 80061 LIPID PANEL: CPT | Performed by: PATHOLOGY

## 2024-01-12 PROCEDURE — 99000 SPECIMEN HANDLING OFFICE-LAB: CPT | Performed by: PATHOLOGY

## 2024-01-12 PROCEDURE — G0103 PSA SCREENING: HCPCS | Performed by: PATHOLOGY

## 2024-01-12 PROCEDURE — 36415 COLL VENOUS BLD VENIPUNCTURE: CPT | Performed by: PATHOLOGY

## 2024-01-12 PROCEDURE — 82306 VITAMIN D 25 HYDROXY: CPT | Performed by: INTERNAL MEDICINE

## 2024-01-12 PROCEDURE — 85025 COMPLETE CBC W/AUTO DIFF WBC: CPT | Performed by: PATHOLOGY

## 2024-01-12 PROCEDURE — 80053 COMPREHEN METABOLIC PANEL: CPT | Performed by: PATHOLOGY

## 2024-01-12 PROCEDURE — 99214 OFFICE O/P EST MOD 30 MIN: CPT | Performed by: INTERNAL MEDICINE

## 2024-02-27 LAB — NONINV COLON CA DNA+OCC BLD SCRN STL QL: POSITIVE

## 2024-04-03 ENCOUNTER — TELEPHONE (OUTPATIENT)
Dept: GASTROENTEROLOGY | Facility: CLINIC | Age: 59
End: 2024-04-03
Payer: COMMERCIAL

## 2024-04-03 NOTE — TELEPHONE ENCOUNTER
"Patient is going to wait on scheduling at this time. He will call back if he wants to continue.     Endoscopy Scheduling Screen    Have you had a positive Covid test in the last 14 days?  No    What is your communication preference for Instructions and/or Bowel Prep?   Mail/USPS    What insurance is in the chart?  Other:  [facility]    Ordering/Referring Provider:     Abram Bagley MD in [facility] INTERNAL [facility]      (If ordering provider performs procedure, schedule with ordering provider unless otherwise instructed. )    BMI: Estimated body mass index is 24.7 kg/m  as calculated from the following:    Height as of 1/12/24: 1.854 m (6' 0.99\").    Weight as of 1/12/24: 84.9 kg (187 lb 3.2 oz).     Sedation Ordered  moderate sedation.   If patient BMI > 50 do not schedule in [facility].    If patient BMI > 45 do not schedule at [facility].    Are you taking methadone or Suboxone?  Yes   Patient must be scheduled with MAC sedation.    Have you had difficulties, pain, or discomfort during past endoscopy procedures?  No    Are you taking any prescription medications for pain 3 or more times per week?   NO, No RN review required.    Do you have a history of malignant hyperthermia?  No    (Females) Are you currently pregnant?   No     Have you been diagnosed or told you have pulmonary hypertension?   No    Do you have an LVAD?  No    Have you been told you have moderate to severe sleep apnea?  No    Have you been told you have COPD, asthma, or any other lung disease?  No    Do you have any heart conditions?  No     Have you ever had or are you waiting for an organ transplant?  No. Continue scheduling, no site restrictions.    Have you had a stroke or transient ischemic attack (TIA aka \"mini stroke\" in the last 6 months?   No    Have you been diagnosed with or been told you have cirrhosis of the liver?   No    Are you currently on dialysis?   No    Do you need assistance transferring?   No    BMI: Estimated body mass index is 24.7 kg/m  " "as calculated from the following:    Height as of 1/12/24: 1.854 m (6' 0.99\").    Weight as of 1/12/24: 84.9 kg (187 lb 3.2 oz).     Is patients BMI > 40 and scheduling location [facility]?  No    Do you take an injectable medication for weight loss or diabetes (excluding insulin)?  No    Do you take the medication Naltrexone?  No    Do you take blood thinners?  No       Prep   Are you currently on dialysis or do you have chronic kidney disease?  No    Do you have a diagnosis of diabetes?  No    Do you have a diagnosis of cystic fibrosis (CF)?  No    On a regular basis do you go 3 -5 days between bowel movements?  No    BMI > 40?  No    Preferred Pharmacy:    [id number] - [facility] [address] [place] [address]  [address]  [facility] [address]  Phone: [phone] Fax: [phone]      Final Scheduling Details     Procedure scheduled  Colonoscopy    Surgeon:       Date of procedure:       Pre-OP / PAC:       Location       Sedation          Patient Reminders:   You will receive a call from a Nurse to review instructions and health history.  This assessment must be completed prior to your procedure.  Failure to complete the Nurse assessment may result in the procedure being cancelled.      On the day of your procedure, please designate an adult(s) who can drive you home stay with you for the next 24 hours. The medicines used in the exam will make you sleepy. You will not be able to drive.      You cannot take public transportation, ride share services, or non-medical taxi service without a responsible caregiver.  Medical transport services are allowed with the requirement that a responsible caregiver will receive you at your destination.  We require that drivers and caregivers are confirmed prior to your procedure.  "

## 2024-06-07 DIAGNOSIS — Z98.890 H/O CERVICAL SPINE SURGERY: Primary | ICD-10-CM

## 2024-07-31 ENCOUNTER — HOSPITAL ENCOUNTER (EMERGENCY)
Facility: CLINIC | Age: 59
Discharge: HOME | End: 2024-08-01
Attending: EMERGENCY MEDICINE | Admitting: EMERGENCY MEDICINE
Payer: COMMERCIAL

## 2024-07-31 DIAGNOSIS — F11.90 OPIOID USE DISORDER: ICD-10-CM

## 2024-07-31 DIAGNOSIS — T40.2X1A OPIOID OVERDOSE, ACCIDENTAL OR UNINTENTIONAL, INITIAL ENCOUNTER (H): ICD-10-CM

## 2024-07-31 LAB
GLUCOSE BLDC GLUCOMTR-MCNC: 74 MG/DL (ref 70–99)
HOLD SPECIMEN: NORMAL

## 2024-07-31 PROCEDURE — 250N000011 HC RX IP 250 OP 636: Performed by: EMERGENCY MEDICINE

## 2024-07-31 PROCEDURE — 93005 ELECTROCARDIOGRAM TRACING: CPT

## 2024-07-31 PROCEDURE — 99285 EMERGENCY DEPT VISIT HI MDM: CPT | Mod: 25

## 2024-07-31 PROCEDURE — 82962 GLUCOSE BLOOD TEST: CPT

## 2024-07-31 PROCEDURE — 96374 THER/PROPH/DIAG INJ IV PUSH: CPT

## 2024-07-31 RX ORDER — NALOXONE HYDROCHLORIDE 1 MG/ML
4 INJECTION INTRAMUSCULAR; INTRAVENOUS; SUBCUTANEOUS ONCE
Status: COMPLETED | OUTPATIENT
Start: 2024-07-31 | End: 2024-07-31

## 2024-07-31 RX ORDER — NALOXONE HYDROCHLORIDE 0.4 MG/ML
0.4 INJECTION, SOLUTION INTRAMUSCULAR; INTRAVENOUS; SUBCUTANEOUS ONCE
Status: COMPLETED | OUTPATIENT
Start: 2024-07-31 | End: 2024-07-31

## 2024-07-31 RX ADMIN — NALOXONE HYDROCHLORIDE 4 MG: 1 INJECTION PARENTERAL at 20:19

## 2024-07-31 RX ADMIN — NALOXONE HYDROCHLORIDE 0.4 MG: 0.4 INJECTION, SOLUTION INTRAMUSCULAR; INTRAVENOUS; SUBCUTANEOUS at 21:23

## 2024-07-31 ASSESSMENT — ACTIVITIES OF DAILY LIVING (ADL)
ADLS_ACUITY_SCORE: 38

## 2024-07-31 ASSESSMENT — COLUMBIA-SUICIDE SEVERITY RATING SCALE - C-SSRS
6. HAVE YOU EVER DONE ANYTHING, STARTED TO DO ANYTHING, OR PREPARED TO DO ANYTHING TO END YOUR LIFE?: NO
2. HAVE YOU ACTUALLY HAD ANY THOUGHTS OF KILLING YOURSELF IN THE PAST MONTH?: NO
1. IN THE PAST MONTH, HAVE YOU WISHED YOU WERE DEAD OR WISHED YOU COULD GO TO SLEEP AND NOT WAKE UP?: NO

## 2024-08-01 ENCOUNTER — APPOINTMENT (OUTPATIENT)
Dept: GENERAL RADIOLOGY | Facility: CLINIC | Age: 59
End: 2024-08-01
Attending: EMERGENCY MEDICINE
Payer: COMMERCIAL

## 2024-08-01 VITALS
OXYGEN SATURATION: 93 % | RESPIRATION RATE: 21 BRPM | HEIGHT: 73 IN | WEIGHT: 180 LBS | TEMPERATURE: 98.2 F | DIASTOLIC BLOOD PRESSURE: 93 MMHG | HEART RATE: 66 BPM | BODY MASS INDEX: 23.86 KG/M2 | SYSTOLIC BLOOD PRESSURE: 158 MMHG

## 2024-08-01 LAB
ATRIAL RATE - MUSE: 75 BPM
DIASTOLIC BLOOD PRESSURE - MUSE: NORMAL MMHG
INTERPRETATION ECG - MUSE: NORMAL
P AXIS - MUSE: 70 DEGREES
PR INTERVAL - MUSE: 148 MS
QRS DURATION - MUSE: 106 MS
QT - MUSE: 378 MS
QTC - MUSE: 422 MS
R AXIS - MUSE: 59 DEGREES
SYSTOLIC BLOOD PRESSURE - MUSE: NORMAL MMHG
T AXIS - MUSE: 47 DEGREES
VENTRICULAR RATE- MUSE: 75 BPM

## 2024-08-01 PROCEDURE — 71046 X-RAY EXAM CHEST 2 VIEWS: CPT

## 2024-08-01 PROCEDURE — 250N000011 HC RX IP 250 OP 636: Performed by: EMERGENCY MEDICINE

## 2024-08-01 PROCEDURE — 96376 TX/PRO/DX INJ SAME DRUG ADON: CPT

## 2024-08-01 RX ORDER — NALOXONE HYDROCHLORIDE 0.4 MG/ML
0.2 INJECTION, SOLUTION INTRAMUSCULAR; INTRAVENOUS; SUBCUTANEOUS ONCE
Status: COMPLETED | OUTPATIENT
Start: 2024-08-01 | End: 2024-08-01

## 2024-08-01 RX ADMIN — NALOXONE HYDROCHLORIDE 0.2 MG: 0.4 INJECTION, SOLUTION INTRAMUSCULAR; INTRAVENOUS; SUBCUTANEOUS at 04:42

## 2024-08-01 ASSESSMENT — ACTIVITIES OF DAILY LIVING (ADL)
ADLS_ACUITY_SCORE: 38

## 2024-08-01 NOTE — ED NOTES
At 2155 patient spo2 dropped to 87%. Patient placed on 4Lo2/NC. Spo2 increased to 94%. At 2200 patient taken for a walking trial with ED tech patient remained on 4Lo2 and maintained spo2 greater than 90%. Tolerated walk without SOB.

## 2024-08-01 NOTE — ED NOTES
Patient able to get up to bathroom to void with trial on room air. Patient spo2 86% upon return from bathroom. Reconnected to 2L O2/NC Spo2 returned to greater than 90%. Patient able to sit at edge of bed and eat.

## 2024-08-01 NOTE — ED PROVIDER NOTES
Took over care of the patient at handoff pending sobriety.    Patient is sleeping in the room, when I evaluate him he wakes easily, he interacts with me.  He was able to get up and move around.  He is still quite sleepy.  Will continue to evaluate him here in the emergency department.  Patient continues to need 2 L nasal cannula.    On reevaluation, patient is able to wake up and move around the room.  His oxygen saturation is 89%.  He is placed on 1 L nasal cannula.  Gave him 0.2mg of narcan with improvement of O2 sat to 98%. Chest xray does not show any signs of aspiration. Will continue to monitor him here in the emergency department.    Patient is continue to be monitored.  When he falls asleep he is 90% on room air.  When he is awake he is 95 to 96% on room air.  He states that he is feeling much better.  He is much more awake, he is speaking in clear sentences. I think his symptoms are secondary to drug use. I have very low suspicion for PE, Pneumonia. No signs of pneumothorax.   He is ready to be discharged home.     Bel Hernandez MD  08/01/24 0546

## 2024-08-01 NOTE — ED NOTES
RN called to patient car by wife for patient being awake but unresponsive. Per wife, she was called to come get the patient from a friend's house where they told her to bring patient to the emergency room for narcan. Per wife, patient is known heroin user. Patient appears awake but not alert and only responsive to sternal rub in car. Per wife, patient also potentially was foaming at the mouth. Patient placed in bed and wheeled back to stab room. 4 IN narcan given on trip back to room at 1952 with minimal response.

## 2024-08-01 NOTE — ED PROVIDER NOTES
History     Chief Complaint:  Drug / Alcohol Assessment       HPI   Salas Macias is a 59 year old male with hx of heroin abuse in active treatment that was at friends in [facility] that became sleepy on ride home with wife.  Snorted some unknown opioid.  Received naloxone in triage.  Per wife and patient prior to this today no complaints.  No HA vision changes chest pain SOB fever cough abd pain NVD.   Has been to ER before for right eye.  Has 2 pack of naloxone at home.        Independent Historian:    Wife    Review of External Notes:      Medications:    acetaminophen (TYLENOL) 325 MG tablet  ALPRAZolam (XANAX) 1 MG tablet  aspirin (ASA) 81 MG chewable tablet  atorvastatin (LIPITOR) 20 MG tablet  buPROPion (WELLBUTRIN XL) 150 MG 24 hr tablet  carvedilol (COREG) 12.5 MG tablet  gabapentin (NEURONTIN) 300 MG capsule  methadone (DOLOPHINE-INTENSOL) 10 MG/ML (HIGH CONC) solution  nicotine polacrilex (NICORETTE) 4 MG gum  NIFEdipine ER OSMOTIC (ADALAT CC) 30 MG 24 hr tablet  OLANZapine (ZYPREXA) 10 MG tablet  OLANZapine (ZYPREXA) 20 MG tablet  oxyCODONE (ROXICODONE) 5 MG tablet  polyethylene glycol (MIRALAX) 17 g packet  senna-docusate (SENOKOT-S/PERICOLACE) 8.6-50 MG tablet  vitamin D3 (CHOLECALCIFEROL) 50 mcg (2000 units) tablet        Past Medical History:    Past Medical History:   Diagnosis Date    Anxiety     Cervical myelopathy     Depression     HLD (hyperlipidemia)     Paranoid schizophrenia (H)        Past Surgical History:    Past Surgical History:   Procedure Laterality Date    LAMINOPLASTY CERVICAL POSTERIOR THREE+ LEVELS N/A 9/27/2023    Procedure: Cervical 2 and 7 Dome Laminectomies and C3-6 Laminoplasty with [employer] Implants;  Surgeon: Anirudh Sullivan MD;  Location: [facility] OR          Physical Exam   Patient Vitals for the past 24 hrs:   BP Temp Temp src Pulse Resp SpO2 Height Weight   07/31/24 2115 -- -- -- 102 18 92 % -- --   07/31/24 2110 -- -- -- 96 18 92 % -- --  "  07/31/24 2100 135/82 -- -- 92 24 93 % -- --   07/31/24 2050 -- -- -- 85 -- 92 % -- --   07/31/24 2015 -- -- -- 66 27 -- -- --   07/31/24 2000 -- -- -- 79 21 100 % -- --   07/31/24 1957 (!) 170/111 98.2  F (36.8  C) Temporal 76 17 98 % 1.854 m (6' 1\") 81.6 kg (180 lb)        Physical Exam  Constitutional: Patient is well appearing. No distress.  Head: Atraumatic.  Mouth/Throat: Oropharynx is clear and moist. No oropharyngeal exudate.  Eyes: Conjunctivae and EOM are normal. No scleral icterus.  Neck: Normal range of motion. Neck supple.   Cardiovascular: Normal rate, regular rhythm, normal heart sounds and intact distal perfusion.   Pulmonary/Chest: Breath sounds normal. No respiratory distress.  Abdominal: Soft. Bowel sounds are normal. No distension. No tenderness. No rebound or guarding.   Musculoskeletal: Normal range of motion. No edema or tenderness.   Neurological: Alert and orientated to person, place, and time. No observable focal neuro deficit  Skin: Warm and dry. No rash noted. Not diaphoretic.      Emergency Department Course   ECG  NSR HR 75.  No acute injury pattern.     Imaging:  No orders to display       Laboratory:  Labs Ordered and Resulted from Time of ED Arrival to Time of ED Departure   GLUCOSE BY METER - Normal       Result Value    GLUCOSE BY METER POCT 74          Procedures     Emergency Department Course & Assessments:    Interventions:  Medications   naloxone (NARCAN) injection 4 mg (4 mg Nasal $Given 7/31/24 2019)   naloxone (NARCAN) injection 0.4 mg (0.4 mg Intravenous $Given 7/31/24 2123)        Assessments:      Independent Interpretation (X-rays, CTs, rhythm strip):  Consultations/Discussion of Management or Tests:    ED Course as of 07/31/24 2148 Wed Jul 31, 2024 2138 I rechecked the patient and explained findings.        Social Determinants of Health affecting care:       Disposition:  Care transitioned pending full metabolization    Impression & Plan         Medical Decision " Making:  Suspected and confirmed opioid overdose.  Had no cease of respirations or pulselessness.  Given naloxone in triage full reversal.  As this wore off still conversant but birderline hypoxia sand sleepiness another dose given and NC O2.  Will need to wait for full metabpolization.  Has narcan at home.  No other CV resp emergencies apparent.      Diagnosis:    ICD-10-CM    1. Opioid use disorder  F11.90       2. Opioid overdose, accidental or unintentional, initial encounter (H)  T40.2X1A            Discharge Medications:  New Prescriptions    No medications on file            7/31/2024   Neno Copeland MD Stevens, Andrew C, MD  07/31/24 2146

## 2024-08-01 NOTE — ED NOTES
Pt brought back from triage as untriaged, unregistered pt. Per triage RN pt arrived w/ wife only responsive to sternal rub. Triage RN gave 4 of narcan IN. Pt is a known heroin user. In ED room 3 pt is opening eyes spontaneously.

## 2025-03-12 ENCOUNTER — TELEPHONE (OUTPATIENT)
Dept: INTERNAL MEDICINE | Facility: CLINIC | Age: 60
End: 2025-03-12
Payer: COMMERCIAL

## 2025-03-12 NOTE — TELEPHONE ENCOUNTER
Called patient- he endorses that the weakness has been progressive for the last year. I recommended that he please make an appointment.    Sam France RN on 3/12/2025 at 12:50 PM

## 2025-03-12 NOTE — TELEPHONE ENCOUNTER
[facility] Call Center    Phone Message    May a detailed message be left on voicemail: yes     Reason for Call: Other: Pt wanted to inform the clinic that his  will be calling in to request a lift chair. Pt states he is weak and can no longer stand. Please advise.     Action Taken: Other: PCC    Travel Screening: Not Applicable     Date of Service:

## (undated) DEVICE — SOL ISOPROPYL RUBBING ALCOHOL USP 70% 4OZ HDX-20 I0020

## (undated) DEVICE — Device

## (undated) DEVICE — SU VICRYL 1 CT-1 CR 8X18" J741D

## (undated) DEVICE — DRAPE C-ARMOR 5 SIDED 5523

## (undated) DEVICE — SPONGE SURGIFOAM 100 1974

## (undated) DEVICE — GLOVE BIOGEL PI ULTRATOUCH G SZ 7.5 42175

## (undated) DEVICE — SU VICRYL 1 CT-1 36" UND J947H

## (undated) DEVICE — SU VICRYL 2-0 CT-2 8X18" UND D8144

## (undated) DEVICE — DRAIN JACKSON PRATT RESERVOIR 100ML SU130-1305

## (undated) DEVICE — SU MONOCRYL 3-0 PS-2 27" Y427H

## (undated) DEVICE — PIN SKULL MAYFIELD ADULT TITANIUM 3/PK A1120

## (undated) DEVICE — PREP CHLORAPREP 26ML TINTED HI-LITE ORANGE 930815

## (undated) DEVICE — SYR 50ML LL W/O NDL 309653

## (undated) DEVICE — SUCTION MANIFOLD NEPTUNE 2 SYS 4 PORT 0702-020-000

## (undated) DEVICE — GOWN IMPERVIOUS SPECIALTY XLG/XLONG 32474

## (undated) DEVICE — PEN MARKING SKIN W/PAPER RULER 31145785

## (undated) DEVICE — DRAIN JACKSON PRATT ROUND W/TROCAR 15FR LF JP-HUR151

## (undated) DEVICE — SU ETHILON 3-0 PS-1 18" 1663H

## (undated) DEVICE — DRAPE C-ARM W/STRAPS 42X72" 07-CA104

## (undated) DEVICE — SOL NACL 0.9% IRRIG 1000ML BOTTLE 2F7124

## (undated) DEVICE — GLOVE BIOGEL PI MICRO INDICATOR UNDERGLOVE SZ 8.0 48980

## (undated) DEVICE — TOOL DISSECT MIDAS MR8 14CM MATCH HEAD 3MM MR8-14MH30

## (undated) DEVICE — TAPE DURAPORE 3" SILK 1538-3

## (undated) DEVICE — DRAIN ROUND W/RESERV KIT JACKSON PRATT 10FR 400ML SU130-402D

## (undated) DEVICE — POSITIONER ARMBOARD FOAM 1PAIR LF FP-ARMB1

## (undated) DEVICE — LINEN TOWEL PACK X5 5464

## (undated) DEVICE — LINEN BACK PACK 5440

## (undated) DEVICE — WIPES FOLEY CARE SURESTEP PROVON DFC100

## (undated) RX ORDER — GABAPENTIN 300 MG/1
CAPSULE ORAL
Status: DISPENSED
Start: 2023-09-27

## (undated) RX ORDER — FENTANYL CITRATE 50 UG/ML
INJECTION, SOLUTION INTRAMUSCULAR; INTRAVENOUS
Status: DISPENSED
Start: 2023-09-27

## (undated) RX ORDER — ONDANSETRON 2 MG/ML
INJECTION INTRAMUSCULAR; INTRAVENOUS
Status: DISPENSED
Start: 2023-09-27

## (undated) RX ORDER — PROPOFOL 10 MG/ML
INJECTION, EMULSION INTRAVENOUS
Status: DISPENSED
Start: 2023-09-27

## (undated) RX ORDER — CEFAZOLIN SODIUM/WATER 2 G/20 ML
SYRINGE (ML) INTRAVENOUS
Status: DISPENSED
Start: 2023-09-27

## (undated) RX ORDER — IPRATROPIUM BROMIDE AND ALBUTEROL SULFATE 2.5; .5 MG/3ML; MG/3ML
SOLUTION RESPIRATORY (INHALATION)
Status: DISPENSED
Start: 2023-09-27

## (undated) RX ORDER — HYDROMORPHONE HYDROCHLORIDE 1 MG/ML
INJECTION, SOLUTION INTRAMUSCULAR; INTRAVENOUS; SUBCUTANEOUS
Status: DISPENSED
Start: 2023-09-27

## (undated) RX ORDER — ESMOLOL HYDROCHLORIDE 10 MG/ML
INJECTION INTRAVENOUS
Status: DISPENSED
Start: 2023-09-27

## (undated) RX ORDER — ACETAMINOPHEN 325 MG/1
TABLET ORAL
Status: DISPENSED
Start: 2023-09-27

## (undated) RX ORDER — FENTANYL CITRATE-0.9 % NACL/PF 10 MCG/ML
PLASTIC BAG, INJECTION (ML) INTRAVENOUS
Status: DISPENSED
Start: 2023-09-27

## (undated) RX ORDER — VANCOMYCIN HYDROCHLORIDE 1 G/20ML
INJECTION, POWDER, LYOPHILIZED, FOR SOLUTION INTRAVENOUS
Status: DISPENSED
Start: 2023-09-27

## (undated) RX ORDER — DEXAMETHASONE SODIUM PHOSPHATE 4 MG/ML
INJECTION, SOLUTION INTRA-ARTICULAR; INTRALESIONAL; INTRAMUSCULAR; INTRAVENOUS; SOFT TISSUE
Status: DISPENSED
Start: 2023-09-27

## (undated) RX ORDER — GLYCOPYRROLATE 0.2 MG/ML
INJECTION INTRAMUSCULAR; INTRAVENOUS
Status: DISPENSED
Start: 2023-09-27